# Patient Record
Sex: MALE | Race: BLACK OR AFRICAN AMERICAN | NOT HISPANIC OR LATINO | URBAN - METROPOLITAN AREA
[De-identification: names, ages, dates, MRNs, and addresses within clinical notes are randomized per-mention and may not be internally consistent; named-entity substitution may affect disease eponyms.]

---

## 2017-05-12 ENCOUNTER — INPATIENT (INPATIENT)
Facility: HOSPITAL | Age: 77
LOS: 4 days | Discharge: HOME HEALTH | DRG: 65 | End: 2017-05-17
Attending: PSYCHIATRY & NEUROLOGY | Admitting: PSYCHIATRY & NEUROLOGY
Payer: MEDICAID

## 2017-05-12 VITALS
OXYGEN SATURATION: 97 % | SYSTOLIC BLOOD PRESSURE: 125 MMHG | TEMPERATURE: 100 F | HEART RATE: 94 BPM | RESPIRATION RATE: 20 BRPM | DIASTOLIC BLOOD PRESSURE: 79 MMHG

## 2017-05-12 LAB
ALBUMIN SERPL ELPH-MCNC: 3.7 G/DL — SIGNIFICANT CHANGE UP (ref 3.4–5)
ALP SERPL-CCNC: 63 U/L — SIGNIFICANT CHANGE UP (ref 40–120)
ALT FLD-CCNC: 24 U/L — SIGNIFICANT CHANGE UP (ref 12–42)
ANION GAP SERPL CALC-SCNC: 6 MMOL/L — LOW (ref 9–16)
AST SERPL-CCNC: 27 U/L — SIGNIFICANT CHANGE UP (ref 15–37)
BASOPHILS NFR BLD AUTO: 0.4 % — SIGNIFICANT CHANGE UP (ref 0–2)
BILIRUB SERPL-MCNC: 0.5 MG/DL — SIGNIFICANT CHANGE UP (ref 0.2–1.2)
BLD GP AB SCN SERPL QL: NEGATIVE — SIGNIFICANT CHANGE UP
BLD GP AB SCN SERPL QL: NEGATIVE — SIGNIFICANT CHANGE UP
BUN SERPL-MCNC: 14 MG/DL — SIGNIFICANT CHANGE UP (ref 7–23)
CALCIUM SERPL-MCNC: 8.8 MG/DL — SIGNIFICANT CHANGE UP (ref 8.5–10.5)
CHLORIDE SERPL-SCNC: 106 MMOL/L — SIGNIFICANT CHANGE UP (ref 96–108)
CHOLEST SERPL-MCNC: 113 MG/DL — SIGNIFICANT CHANGE UP
CK MB CFR SERPL CALC: <1 NG/ML — SIGNIFICANT CHANGE UP (ref 0.5–3.6)
CK SERPL-CCNC: 317 U/L — HIGH (ref 39–308)
CO2 SERPL-SCNC: 27 MMOL/L — SIGNIFICANT CHANGE UP (ref 22–31)
CREAT SERPL-MCNC: 1.01 MG/DL — SIGNIFICANT CHANGE UP (ref 0.5–1.3)
EOSINOPHIL NFR BLD AUTO: 3.1 % — SIGNIFICANT CHANGE UP (ref 0–6)
GLUCOSE SERPL-MCNC: 133 MG/DL — HIGH (ref 70–99)
HBA1C BLD-MCNC: 6.9 % — HIGH (ref 4.8–5.6)
HCT VFR BLD CALC: 36.5 % — LOW (ref 39–50)
HDLC SERPL-MCNC: 40 MG/DL — SIGNIFICANT CHANGE UP
HGB BLD-MCNC: 12.6 G/DL — LOW (ref 13–17)
LIPID PNL WITH DIRECT LDL SERPL: 56 MG/DL — SIGNIFICANT CHANGE UP
LYMPHOCYTES # BLD AUTO: 37.8 % — SIGNIFICANT CHANGE UP (ref 13–44)
MCHC RBC-ENTMCNC: 30.7 PG — SIGNIFICANT CHANGE UP (ref 27–34)
MCHC RBC-ENTMCNC: 34.5 G/DL — SIGNIFICANT CHANGE UP (ref 32–36)
MCV RBC AUTO: 89 FL — SIGNIFICANT CHANGE UP (ref 80–100)
MONOCYTES NFR BLD AUTO: 9 % — SIGNIFICANT CHANGE UP (ref 2–14)
NEUTROPHILS NFR BLD AUTO: 49.7 % — SIGNIFICANT CHANGE UP (ref 43–77)
PLATELET # BLD AUTO: 193 K/UL — SIGNIFICANT CHANGE UP (ref 150–400)
POTASSIUM SERPL-MCNC: 3.9 MMOL/L — SIGNIFICANT CHANGE UP (ref 3.5–5.3)
POTASSIUM SERPL-SCNC: 3.9 MMOL/L — SIGNIFICANT CHANGE UP (ref 3.5–5.3)
PROT SERPL-MCNC: 7.3 G/DL — SIGNIFICANT CHANGE UP (ref 6.4–8.2)
RBC # BLD: 4.1 M/UL — LOW (ref 4.2–5.8)
RBC # FLD: 12.4 % — SIGNIFICANT CHANGE UP (ref 10.3–16.9)
RH IG SCN BLD-IMP: POSITIVE — SIGNIFICANT CHANGE UP
RH IG SCN BLD-IMP: POSITIVE — SIGNIFICANT CHANGE UP
SODIUM SERPL-SCNC: 139 MMOL/L — SIGNIFICANT CHANGE UP (ref 135–145)
TOTAL CHOLESTEROL/HDL RATIO MEASUREMENT: 2.8 RATIO — SIGNIFICANT CHANGE UP
TRIGL SERPL-MCNC: 87 MG/DL — SIGNIFICANT CHANGE UP
TROPONIN I SERPL-MCNC: 0.05 NG/ML — HIGH (ref 0.01–0.04)
WBC # BLD: 7.8 K/UL — SIGNIFICANT CHANGE UP (ref 3.8–10.5)
WBC # FLD AUTO: 7.8 K/UL — SIGNIFICANT CHANGE UP (ref 3.8–10.5)

## 2017-05-12 PROCEDURE — 99223 1ST HOSP IP/OBS HIGH 75: CPT

## 2017-05-12 PROCEDURE — 93306 TTE W/DOPPLER COMPLETE: CPT | Mod: 26

## 2017-05-12 PROCEDURE — 93010 ELECTROCARDIOGRAM REPORT: CPT

## 2017-05-12 PROCEDURE — 71010: CPT | Mod: 26

## 2017-05-12 PROCEDURE — 70450 CT HEAD/BRAIN W/O DYE: CPT | Mod: 26

## 2017-05-12 PROCEDURE — 99222 1ST HOSP IP/OBS MODERATE 55: CPT

## 2017-05-12 RX ORDER — ATORVASTATIN CALCIUM 80 MG/1
80 TABLET, FILM COATED ORAL AT BEDTIME
Qty: 0 | Refills: 0 | Status: DISCONTINUED | OUTPATIENT
Start: 2017-05-12 | End: 2017-05-17

## 2017-05-12 RX ORDER — METOPROLOL TARTRATE 50 MG
25 TABLET ORAL
Qty: 0 | Refills: 0 | Status: DISCONTINUED | OUTPATIENT
Start: 2017-05-12 | End: 2017-05-17

## 2017-05-12 RX ORDER — PANTOPRAZOLE SODIUM 20 MG/1
40 TABLET, DELAYED RELEASE ORAL
Qty: 0 | Refills: 0 | Status: DISCONTINUED | OUTPATIENT
Start: 2017-05-12 | End: 2017-05-17

## 2017-05-12 RX ORDER — LISINOPRIL 2.5 MG/1
20 TABLET ORAL DAILY
Qty: 0 | Refills: 0 | Status: DISCONTINUED | OUTPATIENT
Start: 2017-05-13 | End: 2017-05-17

## 2017-05-12 RX ORDER — INSULIN LISPRO 100/ML
VIAL (ML) SUBCUTANEOUS
Qty: 0 | Refills: 0 | Status: DISCONTINUED | OUTPATIENT
Start: 2017-05-12 | End: 2017-05-17

## 2017-05-12 RX ORDER — OMEGA-3 ACID ETHYL ESTERS 1 G
1 CAPSULE ORAL
Qty: 0 | Refills: 0 | COMMUNITY

## 2017-05-12 RX ORDER — OMEPRAZOLE 10 MG/1
1 CAPSULE, DELAYED RELEASE ORAL
Qty: 0 | Refills: 0 | COMMUNITY

## 2017-05-12 RX ORDER — ASCORBIC ACID 60 MG
0 TABLET,CHEWABLE ORAL
Qty: 0 | Refills: 0 | COMMUNITY

## 2017-05-12 RX ORDER — LEVETIRACETAM 250 MG/1
750 TABLET, FILM COATED ORAL EVERY 12 HOURS
Qty: 0 | Refills: 0 | Status: DISCONTINUED | OUTPATIENT
Start: 2017-05-12 | End: 2017-05-13

## 2017-05-12 RX ORDER — LACOSAMIDE 50 MG/1
100 TABLET ORAL EVERY 12 HOURS
Qty: 0 | Refills: 0 | Status: DISCONTINUED | OUTPATIENT
Start: 2017-05-12 | End: 2017-05-13

## 2017-05-12 RX ADMIN — LACOSAMIDE 120 MILLIGRAM(S): 50 TABLET ORAL at 21:11

## 2017-05-12 RX ADMIN — LACOSAMIDE 120 MILLIGRAM(S): 50 TABLET ORAL at 09:13

## 2017-05-12 RX ADMIN — LEVETIRACETAM 400 MILLIGRAM(S): 250 TABLET, FILM COATED ORAL at 21:11

## 2017-05-12 RX ADMIN — LEVETIRACETAM 400 MILLIGRAM(S): 250 TABLET, FILM COATED ORAL at 06:53

## 2017-05-12 RX ADMIN — Medication 25 MILLIGRAM(S): at 18:04

## 2017-05-12 RX ADMIN — ATORVASTATIN CALCIUM 80 MILLIGRAM(S): 80 TABLET, FILM COATED ORAL at 22:30

## 2017-05-12 RX ADMIN — PANTOPRAZOLE SODIUM 40 MILLIGRAM(S): 20 TABLET, DELAYED RELEASE ORAL at 18:03

## 2017-05-12 NOTE — H&P ADULT - NSHPREVIEWOFSYSTEMS_GEN_ALL_CORE
REVIEW OF SYSTEMS:    CONSTITUTIONAL: No fevers or chills  EYES/ENT: No visual changes;  No vertigo or throat pain   NECK: No pain or stiffness  RESPIRATORY: No cough, wheezing, hemoptysis; No shortness of breath  CARDIOVASCULAR: No chest pain or palpitations  GASTROINTESTINAL: No abdominal or epigastric pain. No nausea, vomiting, or hematemesis; No diarrhea or constipation. No melena or hematochezia.  GENITOURINARY: No dysuria, frequency or hematuria  NEUROLOGICAL: speech slurring (+), LUE weakness (+)  SKIN: No itching, burning, rashes, or lesions   All other review of systems is negative unless indicated above.

## 2017-05-12 NOTE — SPEECH LANGUAGE PATHOLOGY EVALUATION - COMMENTS
At his baseline, pt's family report he is AAOx1 w coherent speech w mild occasional slurring. Pt would benefit from speech therapy in an acute rehab targeting the following goals:  (1) The pt will answer yes/no questions  (2) The pt will complete generative naming probes  (3) The pt will answer "wh" questions  (4) The pt will complete automated sequences  (5) The pt will speak with precise articulatory contacts
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
## 2017-05-12 NOTE — SPEECH LANGUAGE PATHOLOGY EVALUATION - SLP DIAGNOSIS
Pt p/w moderate, mixed expressive-receptive aphasia, moderate dysarthria and a moderate-severe cognitive impairment.

## 2017-05-12 NOTE — H&P ADULT - HISTORY OF PRESENT ILLNESS
Patient is a 76yo M w/ PMH CVA (2008, 2012), HTN, HLD, DM2, seizure disorder who presented to [facility] w/ slurred speech and agitation. History obtained from pt's family as pt unable to answer. Pt was in his usual state of health until 10pm when one of his daughters noticed the patient w/ slurred speech and "acting differently." Patient began refusing meds, became fussy and argumentative, which is significantly different from the patient's baseline. EMS was called and patient was brought to [facility]. He was noticed w/ significant left arm weakness worsened from baseline. TPA was pushed at ~1am. CTA was obtained and patient was transferred to [facility] MICU for post-tPA protocol. As per family, no complaints or observations of CP, dyspnea, n/v, ab pain, d/c, syncope, dizziness.    As per pt's wife and other daugther, at baseline, pt is AAOx1 (name); bedbound/wheelchair bound; w/ coherent speech w/ mild, occasional slurring; always compliant w/ meds; easy-going mood and affect though w/ infrequent mood lability ever since prior strokes; LUE weakness 5/10 in strength relative to RUE; poor short term memory and occasional lapses of long-term memory. Last seizure in 2015 or 2016; depakote switched at that time to vimpat. Pt has a HHA from 10a-7p 7d/wk. Patient is a 78yo M w/ PMH CVA (2008, 2012), HTN, HLD, DM2, seizure disorder who presented to [facility] w/ slurred speech and agitation. History obtained from pt's family as pt unable to answer. Pt was in his usual state of health until 10pm when one of his daughters noticed the patient w/ slurred speech and "acting differently." Patient began refusing meds, became fussy and argumentative, which is significantly different from the patient's baseline. EMS was called and patient was brought to [facility]. He was noticed w/ significant left arm weakness worsened from baseline. TPA was pushed at ~1am. CTA was obtained and patient was transferred to [facility] MICU for post-tPA protocol. As per family, no complaints or observations of CP, dyspnea, n/v, ab pain, d/c, syncope, dizziness. On arrival, T: 99.8, P: 94, BP: 125/79, RR: 20 O2: 97% RA    As per pt's wife and other daughter at baseline, pt is AAOx1 (name); bedbound/wheelchair bound; w/ coherent speech w/ mild, occasional slurring; always compliant w/ meds; easy-going mood and affect though w/ infrequent mood lability ever since prior strokes; LUE weakness 5/10 in strength relative to RUE; poor short term memory and occasional lapses of long-term memory. Last seizure in 2015 or 2016; depakote switched at that time to vimpat. Pt has a HHA from 10a-7p 7d/wk.
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
## 2017-05-12 NOTE — H&P ADULT - NSHPLABSRESULTS_GEN_ALL_CORE
CT BRAIN STROKE PROTOCOL    IMPRESSION: No evidence of intracranial hemorrhage or acute transcortical   infarction.

## 2017-05-12 NOTE — CONSULT NOTE ADULT - ATTENDING COMMENTS
Pt seen and examined.   History limited.  Family not at bedside.   78 y/o man with a history of stroke 2008 and 2012 with baseline left-sided weakness, hypertension, hyperlipidemia, seizure disorder on lacosamide and levetiracetam presents with left-sided weakness, agitation, confusion, dysarthria, word-finding difficulties.  No seizures at the time.     On arrival, per family , pt is actually AAOx1 at baseline and bedbound.  Has occasional slurring and occasional labile mood.  Poor cognitive function.  Last seizure 2015. Pt has a HHA from 10a-7p 7d/wk.     CT head on arrival to [facility] with no acute findings.  Chronic ischemic changes with ventriculomegaly.    Overnight no acute issues.      On exam this morning, /66, patient awake and alert.  Knows name, birth year, and "hospital." Does not know age, month, current year.  Confused but pleasant.  Follows some simple commands.  Perrl, right gaze preference but easily crosses midline, decreased blink to threat on left, no facial droop, right arm and leg antigravity.  Left neglect, right left confusion.  Left arm 3/5.  Left leg 2/5.  Left toe downgoing.  LUE reflexes brisk.  BLE reflexes 2+.  Pt would not allow RUE reflex testing.  Brisk withdrawal to noxious stimuli.      78 y/o man with a history of stroke 2008 and 2012 with baseline left-sided weakness, hypertension, hyperlipidemia, seizure disorder on lacosamide and levetiracetam presents with left-sided weakness, agitation, confusion, dysarthria, word-finding difficulties.  Unclear which deficits on exam this morning are new.  Will clarify with family.  Continue AEDs.  Continue post-tpa monitoring.  Repeat CT head at midnight tonight (24 hours post tpa).  MRI brain w/o contrast.  BP <180/105.  Continue telemetry.  If films of CTA head/neck not sent from outside hospital and unable to be sent, check MRA head/neck as well as part of work-up for his stroke etiology.  Start clopidogrel 75 mg daily and hep SQ tomorrow if 24 hour CT head negative for hemorrhage.  Speech consult, PT/OT.

## 2017-05-12 NOTE — H&P ADULT - PMH
Cerebrovascular accident (CVA), unspecified mechanism  2008, 2012  Diabetes mellitus type 2, insulin dependent    Essential hypertension    Hyperlipidemia, unspecified hyperlipidemia type    Seizure disorder

## 2017-05-12 NOTE — CONSULT NOTE ADULT - SUBJECTIVE AND OBJECTIVE BOX
Stroke Code Consult Note    Last known well time/Time of onset of symptoms: 22:00 05/11/2017; 22:00 05/11/2017    HPI: Patient is a 78yo M w/ PMH prior CVA in 2008 and 2012 w/ residual L sided weakness, dementia (A&Ox1 at baseline), HTN, HLD, DM, seizure d/o presenting to OSH with worsening L sided weakness and slurred speech. The patient's family states they were present at the onset of symptoms, which occurred around 10pm. He was brought to OSH, with initial NIHSS 9. CT head was negative for hemorrhage and CTA was negative for thrombus. t-PA was administered at 12:46, followed by infusion for 1H, which was completed at 01:47am, after which he was transferred to [facility] for further management. The patient at this time denies any complaints, and states "I'm alright" when asked. He is able to answer occasional questions although is not always reliable in his answers. He denies any HA, CP, difficulty breathing, nausea, or numbness. Per reports from the family, he is wheelchair bound at baseline.    PAST MEDICAL & SURGICAL HISTORY:  Essential hypertension  Cerebrovascular accident (CVA), unspecified mechanism: 2008, 2012    Review of Systems:  Constitutional: unable to obtain info from patient  Eyes: denies eye pain, visual disturbances  ENMT:  unable to obtain from patient  Respiratory: denies difficulty breathing  Cardiovascular: denies chest pain  Gastrointestinal: unable to obtain info from patient  Genitourinary: unable to obtain info from patient  Neurological: As per HPI  Skin: unable to obtain info from patient  Endocrine: unable to obtain info from patient  Musculoskeletal: unable to obtain info from patient  Psychiatric: unable to obtain info from patient  Heme/Lymph: unable to obtain info from patient    MEDICATIONS  (STANDING):  levETIRAcetam  IVPB 750milliGRAM(s) IV Intermittent every 12 hours  lacosamide IVPB 100milliGRAM(s) IV Intermittent every 12 hours  insulin lispro (HumaLOG) corrective regimen sliding scale  SubCutaneous Before meals and at bedtime    MEDICATIONS  (PRN):      Allergies    No Known Allergies    Intolerances        Vital Signs Last 24 Hrs  T(C): 37.7, Max: 37.7 (05-12 @ 03:05)  T(F): 99.8, Max: 99.8 (05-12 @ 03:05)  HR: 88 (78 - 94)  BP: 122/84 (118/69 - 126/73)  BP(mean): 97 (82 - 97)  RR: 20 (18 - 20)  SpO2: 97% (97% - 97%)    Neurologic Exam:  Gen: No acute distress, well-nourished  CV: carotid arteries- no bruits, reg rate and rhythm, no murmurs  Neuro:  Mental status: Awake, alert and oriented x1. Names 2/3 objects. Unable to repeat "no ifs, ands, or buts".  mild dysarthria, severe aphasia, unable to express more than one word at a time, and answering questions appropriately only partially. right gaze preference, however can be overcome.  Cranial nerves: pupils equally round and reactive to light, visual fields full, no nystagmus, extraocular muscles intact, unable to accurately assess V1 through V3, face symmetric, hearing intact to finger rub, palate elevation symmetric, tongue was midline, sternocleidomastoid/shoulder shrug strength bilaterally 5/5.    Motor:  increased tone in LUE and LLE, strength 2/5 LUE, 4/5 RUE, 2/5LLE, 3/5 RLE.   strength 4/5.    Sensation: Intact to light touch.   Coordination: unable to assess due to poor cooperation  Reflexes: 1+ in upper and lower extremities, withdraws to Babinski bilaterally  Gait: not assessed    NIHSS: 14    Blood glucose (fingerstick): 163     Labs:                Radiology and Additional Studies:    EXAM:  CT BRAIN STROKE PROTOCOL                          PROCEDURE DATE:  05/12/2017    IMPRESSION: No evidence of intracranial hemorrhage or acute transcortical   infarction.    The study was performed at 3:29 AM and the above findings were discussed   with Dr. Lopes at  3:49 AM.      Assessment & Plan:  78yo M w/ PMH prior CVA in 2008 and 2012 w/ residual L sided weakness, dementia (A&Ox1 at baseline), HTN, HLD, DM, seizure d/o presenting to OSH with worsening L sided weakness and slurred speech s/p t-PA administration w/ residual LUE and LLE weakness, dysarthria, aphasia, and inattention now admitted for ICU monitoring post-tPA.   - maintain strict bedrest with HOB 30 degrees for 1st 24H   - bolus 1L IVF for perfusion, maintain BP <180/105   - Continue home seizure medication   - MRI brain w/o contrast   - echocardiogram    - Please bring CD of radiology images from [place] to radiology in AM to upload into [employer]   - Neuro checks every 15 minutes for first hour, every 30 minutes for next 6 hours, then hourly until 24H from t-PA infusion (01:47AM)   - maintain temperature <100F and maintain glucose 80-140mg/dL   - NPO for first 12H, followed by dysphagia screening   - Repeat CT head 24H post t-PA   - hold all antiplatelet, anticoagulation, heparin SQ until 24H CT head negative for bleed.   - SCDs   - PT/OT Stroke Code Consult Note    Last known well time/Time of onset of symptoms: 22:00 05/11/2017; 22:00 05/11/2017    HPI: Patient is a 78yo M w/ PMH prior CVA in 2008 and 2012 w/ residual L sided weakness, dementia (A&Ox1 at baseline), HTN, HLD, DM, seizure d/o presenting to OSH with worsening L sided weakness and slurred speech. The patient's family states they were present at the onset of symptoms, which occurred around 10pm. He was brought to OSH, with initial NIHSS 9. CT head was negative for hemorrhage and CTA was negative for thrombus. t-PA was administered at 12:46, followed by infusion for 1H, which was completed at 01:47am, after which he was transferred to [facility] for further management. The patient at this time denies any complaints, and states "I'm alright" when asked. He is able to answer occasional questions although is not always reliable in his answers. He denies any HA, CP, difficulty breathing, nausea, or numbness. Per reports from the family, he is wheelchair bound at baseline.    PAST MEDICAL & SURGICAL HISTORY:  Essential hypertension  Cerebrovascular accident (CVA), unspecified mechanism: 2008, 2012    Review of Systems:  Constitutional: unable to obtain info from patient  Eyes: denies eye pain, visual disturbances  ENMT:  unable to obtain from patient  Respiratory: denies difficulty breathing  Cardiovascular: denies chest pain  Gastrointestinal: unable to obtain info from patient  Genitourinary: unable to obtain info from patient  Neurological: As per HPI  Skin: unable to obtain info from patient  Endocrine: unable to obtain info from patient  Musculoskeletal: unable to obtain info from patient  Psychiatric: unable to obtain info from patient  Heme/Lymph: unable to obtain info from patient    MEDICATIONS  (STANDING):  levETIRAcetam  IVPB 750milliGRAM(s) IV Intermittent every 12 hours  lacosamide IVPB 100milliGRAM(s) IV Intermittent every 12 hours  insulin lispro (HumaLOG) corrective regimen sliding scale  SubCutaneous Before meals and at bedtime    MEDICATIONS  (PRN):      Allergies    No Known Allergies    Intolerances        Vital Signs Last 24 Hrs  T(C): 37.7, Max: 37.7 (05-12 @ 03:05)  T(F): 99.8, Max: 99.8 (05-12 @ 03:05)  HR: 88 (78 - 94)  BP: 122/84 (118/69 - 126/73)  BP(mean): 97 (82 - 97)  RR: 20 (18 - 20)  SpO2: 97% (97% - 97%)    Neurologic Exam:  Gen: No acute distress, well-nourished  CV: carotid arteries- no bruits, reg rate and rhythm, no murmurs  Neuro:  Mental status: Awake, alert and oriented x1. Names 2/3 objects. Unable to repeat "no ifs, ands, or buts".  mild dysarthria, severe aphasia, unable to express more than one word at a time, and answering questions appropriately only partially. right gaze preference, however can be overcome.  Cranial nerves: pupils equally round and reactive to light, visual fields full, no nystagmus, extraocular muscles intact, unable to accurately assess V1 through V3, face symmetric, hearing intact to finger rub, palate elevation symmetric, tongue was midline, sternocleidomastoid/shoulder shrug strength bilaterally 5/5.    Motor:  increased tone in LUE and LLE, strength 2/5 LUE, 4/5 RUE, 2/5LLE, 3/5 RLE.   strength 4/5.    Sensation: Intact to light touch.   Coordination: unable to assess due to poor cooperation  Reflexes: 1+ in upper and lower extremities, withdraws to Babinski bilaterally  Gait: not assessed    NIHSS: 14    Blood glucose (fingerstick): 163     Labs:      Radiology and Additional Studies:    EXAM:  CT BRAIN STROKE PROTOCOL                          PROCEDURE DATE:  05/12/2017    IMPRESSION: No evidence of intracranial hemorrhage or acute transcortical   infarction.    The study was performed at 3:29 AM and the above findings were discussed   with Dr. Lopes at  3:49 AM.      Assessment & Plan:  78yo M w/ PMH prior CVA in 2008 and 2012 w/ residual L sided weakness, dementia (A&Ox1 at baseline), HTN, HLD, DM, seizure d/o presenting to OSH with worsening L sided weakness and slurred speech s/p t-PA administration w/ residual LUE and LLE weakness, dysarthria, aphasia, and inattention now admitted for ICU monitoring post-tPA.   - maintain strict bedrest with HOB 30 degrees for 1st 24H   - bolus 1L IVF for perfusion, maintain BP <180/105   - Continue home seizure medication   - MRI brain w/o contrast   - echocardiogram    - Please bring CD of radiology images from [place] to radiology in AM to upload into [employer]   - Neuro checks every 15 minutes for first hour, every 30 minutes for next 6 hours, then hourly until 24H from t-PA infusion (01:47AM)   - maintain temperature <100F and maintain glucose 80-140mg/dL   - NPO for first 12H, followed by dysphagia screening   - Repeat CT head 24H post t-PA   - hold all antiplatelet, anticoagulation, heparin SQ until 24H CT head negative for bleed.   - SCDs   - PT/OT

## 2017-05-12 NOTE — H&P ADULT - NSHPPHYSICALEXAM_GEN_ALL_CORE
ICU VITAL SIGNS:  T(C): 37.7, Max: 37.7 (05-12 @ 03:05)  T(F): 99.8, Max: 99.8 (05-12 @ 03:05)  HR: 88 (78 - 94)  BP: 122/84 (118/69 - 126/73)  BP(mean): 97 (82 - 97)  RR: 20 (18 - 20)  SpO2: 97% (97% - 97%)  Wt(kg): --    PHYSICAL EXAM:    Constitutional: WDWN resting comfortably in bed; NAD  Head: NC/AT  Eyes: PERRL, EOMI, anicteric sclera  ENT: no nasal discharge; uvula midline, no oropharyngeal erythema or exudates; MMM  Neck: supple; no JVD or thyromegaly  Respiratory: CTA B/L; no W/R/R, no retractions  Cardiac: +S1/S2; RRR; no M/R/G; PMI non-displaced  Gastrointestinal: soft, NT/ND; no rebound or guarding; +BSx4  Genitourinary: normal external genitalia  Back: spine midline, no bony tenderness or step-offs; no CVAT B/L  Extremities: WWP, no clubbing or cyanosis; no peripheral edema  Musculoskeletal: NROM x4; no joint swelling, tenderness or erythema  Vascular: 2+ radial, femoral, DP/PT pulses B/L  Dermatologic: skin warm, dry and intact; no rashes, wounds, or scars  Lymphatic: no submandibular or cervical LAD  Neurologic: AAOx3; CNII-XII grossly intact; no focal deficits  Psychiatric: affect and characteristics of appearance, verbalizations, behaviors are appropriate ICU VITAL SIGNS:  T(C): 37.7, Max: 37.7 (05-12 @ 03:05)  T(F): 99.8, Max: 99.8 (05-12 @ 03:05)  HR: 88 (78 - 94)  BP: 122/84 (118/69 - 126/73)  BP(mean): 97 (82 - 97)  RR: 20 (18 - 20)  SpO2: 97% (97% - 97%)  Wt(kg): --    PHYSICAL EXAM:    Constitutional: WDWN resting comfortably in bed; NAD  Head: NC/AT  Eyes: PERRL, anicteric sclera  ENT: no nasal discharge; uvula midline, no oropharyngeal erythema or exudates; dry MM  Neck: supple; no JVD or thyromegaly  Respiratory: CTA B/L; no W/R/R, no retractions  Cardiac: +S1/S2; regular rhythm, bradycardia; no M/R/G; PMI non-displaced  Gastrointestinal: soft, NT/ND; no rebound or guarding; +BSx4  Extremities: WWP, no clubbing or cyanosis; no peripheral edema  Musculoskeletal: decreased ROM LUE, LLE; no joint swelling, tenderness or erythema  Vascular: 2+ DP pulses B/L  Dermatologic: skin warm, dry and intact; no rashes, wounds, or scars  Lymphatic: no submandibular or cervical LAD  Neurologic: AAOx2 (name, place); 3/5 strength LUE, 1/5 strength LLE; 5/5 strength RUE, 2/5 strength RLE; decreased L hand  strength; no sensory irregularities; L hemineglect; inability to follow some simple commands; poor short term memory

## 2017-05-13 PROCEDURE — 99233 SBSQ HOSP IP/OBS HIGH 50: CPT | Mod: 24

## 2017-05-13 PROCEDURE — 70450 CT HEAD/BRAIN W/O DYE: CPT | Mod: 26

## 2017-05-13 RX ORDER — LEVETIRACETAM 250 MG/1
750 TABLET, FILM COATED ORAL
Qty: 0 | Refills: 0 | Status: DISCONTINUED | OUTPATIENT
Start: 2017-05-13 | End: 2017-05-17

## 2017-05-13 RX ORDER — ASPIRIN/CALCIUM CARB/MAGNESIUM 324 MG
81 TABLET ORAL DAILY
Qty: 0 | Refills: 0 | Status: DISCONTINUED | OUTPATIENT
Start: 2017-05-13 | End: 2017-05-17

## 2017-05-13 RX ORDER — HEPARIN SODIUM 5000 [USP'U]/ML
5000 INJECTION INTRAVENOUS; SUBCUTANEOUS EVERY 8 HOURS
Qty: 0 | Refills: 0 | Status: DISCONTINUED | OUTPATIENT
Start: 2017-05-13 | End: 2017-05-17

## 2017-05-13 RX ORDER — HEPARIN SODIUM 5000 [USP'U]/ML
5000 INJECTION INTRAVENOUS; SUBCUTANEOUS EVERY 8 HOURS
Qty: 0 | Refills: 0 | Status: DISCONTINUED | OUTPATIENT
Start: 2017-05-13 | End: 2017-05-13

## 2017-05-13 RX ORDER — AMLODIPINE BESYLATE 2.5 MG/1
10 TABLET ORAL DAILY
Qty: 0 | Refills: 0 | Status: DISCONTINUED | OUTPATIENT
Start: 2017-05-13 | End: 2017-05-17

## 2017-05-13 RX ORDER — LACOSAMIDE 50 MG/1
200 TABLET ORAL
Qty: 0 | Refills: 0 | Status: DISCONTINUED | OUTPATIENT
Start: 2017-05-13 | End: 2017-05-17

## 2017-05-13 RX ADMIN — AMLODIPINE BESYLATE 10 MILLIGRAM(S): 2.5 TABLET ORAL at 12:16

## 2017-05-13 RX ADMIN — Medication 2: at 07:41

## 2017-05-13 RX ADMIN — Medication 81 MILLIGRAM(S): at 12:16

## 2017-05-13 RX ADMIN — LEVETIRACETAM 400 MILLIGRAM(S): 250 TABLET, FILM COATED ORAL at 09:22

## 2017-05-13 RX ADMIN — PANTOPRAZOLE SODIUM 40 MILLIGRAM(S): 20 TABLET, DELAYED RELEASE ORAL at 07:34

## 2017-05-13 RX ADMIN — LEVETIRACETAM 750 MILLIGRAM(S): 250 TABLET, FILM COATED ORAL at 22:13

## 2017-05-13 RX ADMIN — Medication 2: at 22:12

## 2017-05-13 RX ADMIN — LISINOPRIL 20 MILLIGRAM(S): 2.5 TABLET ORAL at 07:34

## 2017-05-13 RX ADMIN — Medication 2: at 12:16

## 2017-05-13 RX ADMIN — HEPARIN SODIUM 5000 UNIT(S): 5000 INJECTION INTRAVENOUS; SUBCUTANEOUS at 15:58

## 2017-05-13 RX ADMIN — LACOSAMIDE 200 MILLIGRAM(S): 50 TABLET ORAL at 18:35

## 2017-05-13 RX ADMIN — Medication 25 MILLIGRAM(S): at 18:35

## 2017-05-13 RX ADMIN — HEPARIN SODIUM 5000 UNIT(S): 5000 INJECTION INTRAVENOUS; SUBCUTANEOUS at 22:13

## 2017-05-13 RX ADMIN — Medication 25 MILLIGRAM(S): at 07:34

## 2017-05-13 RX ADMIN — ATORVASTATIN CALCIUM 80 MILLIGRAM(S): 80 TABLET, FILM COATED ORAL at 22:13

## 2017-05-13 RX ADMIN — LACOSAMIDE 200 MILLIGRAM(S): 50 TABLET ORAL at 12:16

## 2017-05-13 NOTE — PHYSICAL THERAPY INITIAL EVALUATION ADULT - ADDITIONAL COMMENTS
Pt A&Ox1 (to self) also able to provide his wife and 1 daughters name, able to answer yes and no questions by saying "yes m'am/no m'am" 100% of time, all other questions pt was able to provide an appropriate response to 25% of the time. Unable to obtain full hx of baseline 2/2 pts accent and slurred speech. Pt states that he lives w/ wife in home w/ no stairs. Has a RW for ambulation, states that he is able to amb from bed to bathroom and that there are grab bars in the bathroom. As per H&P pt has HHA 9hrs/day 7 days/wk.

## 2017-05-13 NOTE — OCCUPATIONAL THERAPY INITIAL EVALUATION ADULT - NS ASR FOLLOW COMMAND OT EVAL
Able to answer yes/no questions/75% of the time/able to follow single-step instructions/speech unintelligible

## 2017-05-13 NOTE — OCCUPATIONAL THERAPY INITIAL EVALUATION ADULT - RANGE OF MOTION EXAMINATION
patient demonstrated AROM of RUE WFL, able to incorporate within bed mobility tasks with some limitation at R shoulder past 110 degrees. AROM 0-50 degrees of L shoulder elicited during reaching task, PROM L shoulder 0-110 degrees within limits of pain

## 2017-05-13 NOTE — PROGRESS NOTE ADULT - ATTENDING COMMENTS
PLAN:   NEURO: neurochecks q2, stepdown to [facility]  Stroke: MRI, cont ASA 81mg daily, high dose statins  h/o seizures, cont lacosamide and keppra  REHAB:  physical therapy evaluation and management    EARLY MOB:      PULM:  Room air, incentive spirometry  CARDIO:  SBP goal 100-180mm Hg; cont metoprolol, lisinopril, amlodipine  ENDO:  Blood sugar goals 140-180 mg/dL, continue insulin sliding scale, good sugar control  GI:  cont PPI (h/o gastric ulcer)  DIET: cont CCD  RENAL:   IV lock  HEM/ONC: Hb stable  VTE Prophylaxis: SCDs, start SQL tonight  ID: afebrile, no leukocytosis  Social: will update family  Patient not at high risk for neurologic deterioration / death.  Time spent on this noncritically ill patient: 45 minutes.

## 2017-05-13 NOTE — PHYSICAL THERAPY INITIAL EVALUATION ADULT - DIAGNOSIS, PT EVAL
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

## 2017-05-13 NOTE — OCCUPATIONAL THERAPY INITIAL EVALUATION ADULT - PERTINENT HX OF CURRENT PROBLEM, REHAB EVAL
76 yo RHD M with h/o CVA, Hypertension, dyslipidemia, Diabetes Mellitus,seizure disorder, presented with slurred speech and agitation.  Brought to [facility], noted L arm weakness.  tPA given at 1 a.m. 05/12, transferred to [facility]

## 2017-05-13 NOTE — OCCUPATIONAL THERAPY INITIAL EVALUATION ADULT - ADDITIONAL COMMENTS
Unable to obtain a full baseline level of function from patient secondary to slurred speech/accent. Able to obtain answers via yes/no questions. Patient reports he received assistance from wife for dressing and bathing. H&P reports home health aide 9 hours per day, 7 days per week. Pt's wife performs IADLs such as cooking. Patient states he used RW for mobility, denies having a wheelchair.

## 2017-05-13 NOTE — PHYSICAL THERAPY INITIAL EVALUATION ADULT - CRITERIA FOR SKILLED THERAPEUTIC INTERVENTIONS
functional limitations in following categories/anticipated discharge recommendation/impairments found/therapy frequency/rehab potential/risk reduction/prevention

## 2017-05-13 NOTE — PHYSICAL THERAPY INITIAL EVALUATION ADULT - PERTINENT HX OF CURRENT PROBLEM, REHAB EVAL
76 yo M w/ PMH CVA (2008, 2012), HTN, HLD, DM2, seizure disorder who presented to [facility] w/ slurred speech and agitation different from baseline. Noted to have significant left arm weakness, tPA pushed at ~1am on 5/12/17. CTA head/neck was negative, transferred to [facility] MICU for post-tPA monitoring, CT head negative for bleed
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
## 2017-05-13 NOTE — OCCUPATIONAL THERAPY INITIAL EVALUATION ADULT - GENERAL OBSERVATIONS, REHAB EVAL
Communicated with DELORES Daigle prior to session and coordinated with PT. Patient received supine with HOB elevated in NAD, +monitor, +tex[place] catheter, +B SCD. Patient does not report pain at rest, patient reports pain in BLE in gravity dependent position and with touch to soles of feet, unable to quantify however grimacing.

## 2017-05-13 NOTE — PHYSICAL THERAPY INITIAL EVALUATION ADULT - MODALITIES TREATMENT COMMENTS
Gaze preference to R visual field, able to overcome and maintain in L visual field w/ max VCs x 15sec

## 2017-05-13 NOTE — PHYSICAL THERAPY INITIAL EVALUATION ADULT - IMPAIRMENTS FOUND, PT EVAL
fine motor/muscle strength/aerobic capacity/endurance/cognitive impairment/gait, locomotion, and balance

## 2017-05-13 NOTE — PROGRESS NOTE ADULT - SUBJECTIVE AND OBJECTIVE BOX
=================================  STROKE ATTENDING NOTE  =================================    ROBERT ZIEGLER   MRN-[id number]  Summary:  77M with h/o CVA Hypertension dyslipidemia Diabetes Mellitus,seizure disorder, presented with slurred speech and agitation.  Brought to [facility], noted L arm weakness.  tPA given at 1 a.m. , transferred to [facility].    Overnight Events: post-tPA  REVIEW OF SYSTEMS:  No headaches, no nausea or vomiting; 14 -point review of systems otherwise unremarkable.    PAST MEDICAL & SURGICAL HISTORY: Seizure disorder Diabetes mellitus type 2, insulin dependent Hyperlipidemia, unspecified hyperlipidemia type Essential hypertension Cerebrovascular accident (CVA), unspecified mechanism: ,  ; h/o gastric ulcer  NKDA  Home meds: lisinopril 20mg daily amlodipine 5mg daily levetiracetam 750 mg BID omeprazole 40mg daily asa 81mg daily metoprolol 25mg BID simvastatin 40mg daily HCTZ 12.5 daily vimpat 200mg BID novolin 22 units and 20 units, MVI vit C fish oil    PHYSICAL EXAMINATION  T(C): , Max: 36.8 (05-12 @ 19:38) HR: 68 (54 - 80) BP: 116/58 (115/62 - 170/84) RR: 24 (18 - 26) SpO2: 93% (92% - 100%)  NEUROLOGIC EXAMINATION:  Patient is awake, oriented x2, L UE 4/5 L LE 2/5 R UE 5/5 R UE 2/5 L hemineglect, does not follow commands, slurred speech  GENERAL:  not intubated, not in cardiorespiratory distress  EENT: anicteric  CARDIOVASC:  (+) S1 S2, normal rate and regular rhythm  PULMONARY:  clear to auscultation bilaterally  ABDOMEN:  soft, nontender, with normoactive bowel sounds  EXTREMITIES:  no edema  SKIN:  no rash    I & Os for 24h ending 17  IN: 230 ml / OUT: 780 ml / NET: -550 ml    LABS:  CAPILLARY BLOOD GLUCOSE 159 (13 May 2017 12:00) 160 (13 May 2017 06:01) 146 (12 May 2017 22:00) 123 (12 May 2017 17:00)                       12.6   7.8   )-----------( 193      ( 12 May 2017 18:27 )             36.5     139  |  106  |  14  ----------------------------<  133<H>  3.9   |  27  |  1.01    Ca    8.8      12 May 2017 18:28    TPro  7.3  /  Alb  3.7  /  TBili  0.5  /  DBili  x   /  AST  27  /  ALT  24  /  AlkPhos  63  -    A1C 6.9  Lipid profile total 113 TG 87 HDL 40 LDL 56    Bacteriology:  CSF studies:  EEG:  Neuroimagin/13 CT: no ICH / infarction, generalized volume loss, ?HCP, stable vents  Other imagin/12 CXR: mild pulmo congestion  Echo: EF 55-60%    MEDICATIONS: mod ISS atorvastatin 80mg HS metoprolol 25mg BID pantoprazole 40mg lisinopril 20mg daily asa 81 mg daily lacosamide 200mg BID levetiracetam 750 BID amlodipine 10mg daily SQH    IV FLUIDS:  DRIPS:  DIET:  Lines / Drains:    CODE STATUS:  full code                       GOALS OF CARE:  aggressive                      DISPOSITION:  stepdown =================================  STROKE ATTENDING NOTE  =================================    ROBERT ZIEGLER   MRN-[id number]  Summary:  77M with h/o CVA Hypertension dyslipidemia Diabetes Mellitus,seizure disorder, presented with slurred speech and agitation.  Brought to [facility], noted L arm weakness.  tPA given at 1 a.m. , transferred to [facility].    Overnight Events: post-tPA  REVIEW OF SYSTEMS:  No headaches, no nausea or vomiting; 14 -point review of systems otherwise unremarkable.    PAST MEDICAL & SURGICAL HISTORY: Seizure disorder Diabetes mellitus type 2, insulin dependent Hyperlipidemia, unspecified hyperlipidemia type Essential hypertension Cerebrovascular accident (CVA), unspecified mechanism: ,  ; h/o gastric ulcer  NKDA  Home meds: lisinopril 20mg daily amlodipine 5mg daily levetiracetam 750 mg BID omeprazole 40mg daily asa 81mg daily metoprolol 25mg BID simvastatin 40mg daily HCTZ 12.5 daily vimpat 200mg BID novolin 22 units and 20 units, MVI vit C fish oil    PHYSICAL EXAMINATION  T(C): , Max: 36.8 (05-12 @ 19:38) HR: 68 (54 - 80) BP: 116/58 (115/62 - 170/84) RR: 24 (18 - 26) SpO2: 93% (92% - 100%)  NEUROLOGIC EXAMINATION:  Patient is awake, oriented x1, L UE 4/5 L LE 3/5 R UE 5/5 R UE 3/5 L hemineglect, does not follow commands, slurred speech  GENERAL:  not intubated, not in cardiorespiratory distress  EENT: anicteric  CARDIOVASC:  (+) S1 S2, normal rate and regular rhythm  PULMONARY:  clear to auscultation bilaterally  ABDOMEN:  soft, nontender, with normoactive bowel sounds  EXTREMITIES:  no edema  SKIN:  no rash    I & Os for 24h ending 17  IN: 230 ml / OUT: 780 ml / NET: -550 ml    LABS:  CAPILLARY BLOOD GLUCOSE 159 (13 May 2017 12:00) 160 (13 May 2017 06:01) 146 (12 May 2017 22:00) 123 (12 May 2017 17:00)                       12.6   7.8   )-----------( 193      ( 12 May 2017 18:27 )             36.5     139  |  106  |  14  ----------------------------<  133<H>  3.9   |  27  |  1.01    Ca    8.8      12 May 2017 18:28    TPro  7.3  /  Alb  3.7  /  TBili  0.5  /  DBili  x   /  AST  27  /  ALT  24  /  AlkPhos  63  -    A1C 6.9  Lipid profile total 113 TG 87 HDL 40 LDL 56    Bacteriology:  CSF studies:  EEG:  Neuroimagin/13 CT: no ICH / infarction, generalized volume loss, ?HCP, stable vents  Other imagin/12 CXR: mild pulmo congestion  Echo: EF 55-60%    MEDICATIONS: mod ISS atorvastatin 80mg HS metoprolol 25mg BID pantoprazole 40mg lisinopril 20mg daily asa 81 mg daily lacosamide 200mg BID levetiracetam 750 BID amlodipine 10mg daily SQH    IV FLUIDS:  DRIPS:  DIET:  Lines / Drains:    CODE STATUS:  full code                       GOALS OF CARE:  aggressive                      DISPOSITION:  stepdown

## 2017-05-13 NOTE — OCCUPATIONAL THERAPY INITIAL EVALUATION ADULT - MUSCLE TONE ASSESSMENT, REHAB EVAL
Left UE/mildly increased tone/L elbow Left UE/L elbow; At times patient clenches R fist and makes punching motions, mostly within context of discomfort/mildly increased tone

## 2017-05-13 NOTE — OCCUPATIONAL THERAPY INITIAL EVALUATION ADULT - GROSSLY INTACT, SENSORY
Grossly Intact/to LT BUE. Hypersensitivity throughout-patient with pain in B soles of feet and with pressure to RUE secondary to BP cuff

## 2017-05-14 LAB
ANION GAP SERPL CALC-SCNC: 4 MMOL/L — LOW (ref 9–16)
BUN SERPL-MCNC: 16 MG/DL — SIGNIFICANT CHANGE UP (ref 7–23)
CALCIUM SERPL-MCNC: 8.9 MG/DL — SIGNIFICANT CHANGE UP (ref 8.5–10.5)
CHLORIDE SERPL-SCNC: 104 MMOL/L — SIGNIFICANT CHANGE UP (ref 96–108)
CO2 SERPL-SCNC: 30 MMOL/L — SIGNIFICANT CHANGE UP (ref 22–31)
CREAT SERPL-MCNC: 1.18 MG/DL — SIGNIFICANT CHANGE UP (ref 0.5–1.3)
GLUCOSE SERPL-MCNC: 145 MG/DL — HIGH (ref 70–99)
HCT VFR BLD CALC: 34.8 % — LOW (ref 39–50)
HGB BLD-MCNC: 11.8 G/DL — LOW (ref 13–17)
MAGNESIUM SERPL-MCNC: 2.1 MG/DL — SIGNIFICANT CHANGE UP (ref 1.6–2.6)
MCHC RBC-ENTMCNC: 30.2 PG — SIGNIFICANT CHANGE UP (ref 27–34)
MCHC RBC-ENTMCNC: 33.9 G/DL — SIGNIFICANT CHANGE UP (ref 32–36)
MCV RBC AUTO: 89 FL — SIGNIFICANT CHANGE UP (ref 80–100)
PLATELET # BLD AUTO: 178 K/UL — SIGNIFICANT CHANGE UP (ref 150–400)
POTASSIUM SERPL-MCNC: 4 MMOL/L — SIGNIFICANT CHANGE UP (ref 3.5–5.3)
POTASSIUM SERPL-SCNC: 4 MMOL/L — SIGNIFICANT CHANGE UP (ref 3.5–5.3)
RBC # BLD: 3.91 M/UL — LOW (ref 4.2–5.8)
RBC # FLD: 12.6 % — SIGNIFICANT CHANGE UP (ref 10.3–16.9)
SODIUM SERPL-SCNC: 138 MMOL/L — SIGNIFICANT CHANGE UP (ref 135–145)
TROPONIN I SERPL-MCNC: <0.015 NG/ML — SIGNIFICANT CHANGE UP (ref 0.01–0.04)
TSH SERPL-MCNC: 1.56 UIU/ML — SIGNIFICANT CHANGE UP (ref 0.35–4.94)
WBC # BLD: 7.6 K/UL — SIGNIFICANT CHANGE UP (ref 3.8–10.5)
WBC # FLD AUTO: 7.6 K/UL — SIGNIFICANT CHANGE UP (ref 3.8–10.5)

## 2017-05-14 PROCEDURE — 99233 SBSQ HOSP IP/OBS HIGH 50: CPT

## 2017-05-14 PROCEDURE — 70551 MRI BRAIN STEM W/O DYE: CPT | Mod: 26

## 2017-05-14 PROCEDURE — 74000: CPT | Mod: 26

## 2017-05-14 RX ORDER — POLYETHYLENE GLYCOL 3350 17 G/17G
17 POWDER, FOR SOLUTION ORAL DAILY
Qty: 0 | Refills: 0 | Status: DISCONTINUED | OUTPATIENT
Start: 2017-05-14 | End: 2017-05-17

## 2017-05-14 RX ORDER — DOCUSATE SODIUM 100 MG
100 CAPSULE ORAL
Qty: 0 | Refills: 0 | Status: DISCONTINUED | OUTPATIENT
Start: 2017-05-14 | End: 2017-05-17

## 2017-05-14 RX ORDER — DOCUSATE SODIUM 100 MG
100 CAPSULE ORAL DAILY
Qty: 0 | Refills: 0 | Status: DISCONTINUED | OUTPATIENT
Start: 2017-05-14 | End: 2017-05-14

## 2017-05-14 RX ADMIN — LEVETIRACETAM 750 MILLIGRAM(S): 250 TABLET, FILM COATED ORAL at 08:33

## 2017-05-14 RX ADMIN — HEPARIN SODIUM 5000 UNIT(S): 5000 INJECTION INTRAVENOUS; SUBCUTANEOUS at 22:28

## 2017-05-14 RX ADMIN — Medication 2: at 22:28

## 2017-05-14 RX ADMIN — Medication 100 MILLIGRAM(S): at 18:41

## 2017-05-14 RX ADMIN — AMLODIPINE BESYLATE 10 MILLIGRAM(S): 2.5 TABLET ORAL at 07:22

## 2017-05-14 RX ADMIN — ATORVASTATIN CALCIUM 80 MILLIGRAM(S): 80 TABLET, FILM COATED ORAL at 22:27

## 2017-05-14 RX ADMIN — Medication 2: at 11:42

## 2017-05-14 RX ADMIN — LEVETIRACETAM 750 MILLIGRAM(S): 250 TABLET, FILM COATED ORAL at 22:28

## 2017-05-14 RX ADMIN — HEPARIN SODIUM 5000 UNIT(S): 5000 INJECTION INTRAVENOUS; SUBCUTANEOUS at 13:23

## 2017-05-14 RX ADMIN — LISINOPRIL 20 MILLIGRAM(S): 2.5 TABLET ORAL at 07:22

## 2017-05-14 RX ADMIN — PANTOPRAZOLE SODIUM 40 MILLIGRAM(S): 20 TABLET, DELAYED RELEASE ORAL at 07:22

## 2017-05-14 RX ADMIN — Medication 2 MILLIGRAM(S): at 16:00

## 2017-05-14 RX ADMIN — LACOSAMIDE 200 MILLIGRAM(S): 50 TABLET ORAL at 07:22

## 2017-05-14 RX ADMIN — POLYETHYLENE GLYCOL 3350 17 GRAM(S): 17 POWDER, FOR SOLUTION ORAL at 18:41

## 2017-05-14 RX ADMIN — Medication 81 MILLIGRAM(S): at 11:42

## 2017-05-14 RX ADMIN — HEPARIN SODIUM 5000 UNIT(S): 5000 INJECTION INTRAVENOUS; SUBCUTANEOUS at 07:21

## 2017-05-14 RX ADMIN — Medication 25 MILLIGRAM(S): at 07:22

## 2017-05-14 RX ADMIN — LACOSAMIDE 200 MILLIGRAM(S): 50 TABLET ORAL at 18:41

## 2017-05-14 RX ADMIN — Medication 25 MILLIGRAM(S): at 18:42

## 2017-05-14 NOTE — PROGRESS NOTE ADULT - ATTENDING COMMENTS
PLAN:   NEURO: neurochecks q2, stepdown to [facility]  Stroke: MRI, cont ASA 81mg daily, high dose statins  h/o seizures, cont lacosamide and keppra  REHAB:  physical therapy evaluation and management    EARLY MOB:      PULM:  Room air, incentive spirometry  CARDIO:  SBP goal 100-180mm Hg; cont metoprolol, lisinopril, amlodipine  ENDO:  Blood sugar goals 140-180 mg/dL, continue insulin sliding scale, good sugar control  GI:  cont PPI (h/o gastric ulcer)  DIET: cont CCD  RENAL:   IV lock  HEM/ONC: Hb stable  VTE Prophylaxis: SCDs, start SQL tonight  ID: afebrile, no leukocytosis  Social: will update family  Patient not at high risk for neurologic deterioration / death.  Time spent on this noncritically ill patient: 45 minutes. PLAN:   NEURO: neurochecks q2, stepdown to [facility]  Stroke: f/u MRI, cont ASA 81mg daily, high dose statins  h/o seizures, cont lacosamide and keppra  REHAB:  physical therapy evaluation and management    EARLY MOB:  OOB to chair if tolerated    PULM:  Room air, incentive spirometry  CARDIO:  SBP goal 100-180mm Hg; on metoprolol, lisinopril, amlodipine  ENDO:  Blood sugar goals 140-180 mg/dL, continue insulin sliding scale, good sugar control  GI:  cont PPI (h/o gastric ulcer)  DIET: cont CCD  RENAL:   IV lock  HEM/ONC: Hb stable  VTE Prophylaxis: SCDs, on SQH  ID: afebrile, no leukocytosis  Social: will update family    Patient not at high risk for neurologic deterioration / death.  Time spent on this noncritically ill patient: 45 minutes. PLAN:   NEURO: neurochecks q2, stepdown to [facility]  Stroke: f/u MRI, cont ASA 81mg daily, high dose statins  h/o seizures, cont lacosamide and keppra  REHAB:  physical therapy evaluation and management    EARLY MOB:  OOB to chair if tolerated    PULM:  Room air, incentive spirometry  CARDIO:  SBP goal 100-180mm Hg; on metoprolol, lisinopril, amlodipine  ENDO:  Blood sugar goals 140-180 mg/dL, continue insulin sliding scale, good sugar control  GI:  cont PPI (h/o gastric ulcer); constipation: start docusate senna standing, and miralax.  DIET: cont CCD  RENAL:   IV lock  HEM/ONC: Hb stable  VTE Prophylaxis: SCDs, on SQH  ID: afebrile, no leukocytosis  Social: will update family    Patient not at high risk for neurologic deterioration / death.  Time spent on this noncritically ill patient: 45 minutes.

## 2017-05-14 NOTE — PROGRESS NOTE ADULT - ASSESSMENT
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  #L sided weakness - unclear if acute or chronic. pending MRI. IMPRESSION:   78yo M w/ PMH prior CVA in 2008 and 2012 w/ residual L sided weakness, dementia (A&Ox1 at baseline), HTN, HLD, DM, seizure d/o presenting to OSH with worsening L sided weakness and slurred speech s/p t-PA administration w/ residual LUE and LLE weakness, dysarthria, aphasia, and inattention, admitted for ICU monitoring post-tPA, now stable for step-down to [facility].    #L sided weakness - unclear if acute or chronic. pending MRI. will c/w high dose statin and aspirin. 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    #L sided weakness - unclear if acute or chronic. pending MRI. will c/w high dose statin and aspirin. SBP goal 100-180. Stroke w/u thus far has been non-contributory for etiology except for A1C 6.9. Blood pressure has been well controlled on home meds. PT and OT consulted.     #Seizure 2/2 original CVA - c/w home meds for ppx    #DM - A1c 6.9. 24 hr only 4 u of coverage. C/w ISS.     #HTN - c/w lisinopril and metoprolol    #Constipation - miralax, colace and senna    #Dementia - patient lives with wife and has HHA. Will need services reinstated once returns home.     #DIET - diabetic and cardiac diet    #PPX - SQH  FULL CODE

## 2017-05-14 NOTE — DIETITIAN INITIAL EVALUATION ADULT. - ENERGY NEEDS
BMI:34.8,IBW:154lbs+/-10%,149% of IBW.Skin intact.No N/V/D or pain.Noted poor dentition requires chopped(mechanical soft foods).Must be fed.

## 2017-05-14 NOTE — PROGRESS NOTE ADULT - SUBJECTIVE AND OBJECTIVE BOX
=================================  STROKE ATTENDING NOTE  =================================    ROBERT ZIEGLER   MRN-[id number]  Summary:  77M with h/o CVA Hypertension dyslipidemia Diabetes Mellitus,seizure disorder, presented with slurred speech and agitation.  Brought to [facility], noted L arm weakness.  tPA given at 1 a.m. , transferred to [facility].    Overnight Events: post-tPA  REVIEW OF SYSTEMS:  No headaches, no nausea or vomiting; 14 -point review of systems otherwise unremarkable.    PAST MEDICAL & SURGICAL HISTORY: Seizure disorder Diabetes mellitus type 2, insulin dependent Hyperlipidemia, unspecified hyperlipidemia type Essential hypertension Cerebrovascular accident (CVA), unspecified mechanism: ,  ; h/o gastric ulcer  NKDA  Home meds: lisinopril 20mg daily amlodipine 5mg daily levetiracetam 750 mg BID omeprazole 40mg daily asa 81mg daily metoprolol 25mg BID simvastatin 40mg daily HCTZ 12.5 daily vimpat 200mg BID novolin 22 units and 20 units, MVI vit C fish oil    PHYSICAL EXAMINATION  T(C): , Max: 36.8 (05-12 @ 19:38) HR: 68 (54 - 80) BP: 116/58 (115/62 - 170/84) RR: 24 (18 - 26) SpO2: 93% (92% - 100%)  NEUROLOGIC EXAMINATION:  Patient is awake, oriented x1, L UE 4/5 L LE 3/5 R UE 5/5 R UE 3/5 L hemineglect, does not follow commands, slurred speech  GENERAL:  not intubated, not in cardiorespiratory distress  EENT: anicteric  CARDIOVASC:  (+) S1 S2, normal rate and regular rhythm  PULMONARY:  clear to auscultation bilaterally  ABDOMEN:  soft, nontender, with normoactive bowel sounds  EXTREMITIES:  no edema  SKIN:  no rash    I & Os for 24h ending 17  IN: 230 ml / OUT: 780 ml / NET: -550 ml    LABS:  CAPILLARY BLOOD GLUCOSE 159 (13 May 2017 12:00) 160 (13 May 2017 06:01) 146 (12 May 2017 22:00) 123 (12 May 2017 17:00)                       12.6   7.8   )-----------( 193      ( 12 May 2017 18:27 )             36.5     139  |  106  |  14  ----------------------------<  133<H>  3.9   |  27  |  1.01    Ca    8.8      12 May 2017 18:28    TPro  7.3  /  Alb  3.7  /  TBili  0.5  /  DBili  x   /  AST  27  /  ALT  24  /  AlkPhos  63  -    A1C 6.9  Lipid profile total 113 TG 87 HDL 40 LDL 56    Bacteriology:  CSF studies:  EEG:  Neuroimagin/13 CT: no ICH / infarction, generalized volume loss, ?HCP, stable vents  Other imagin/12 CXR: mild pulmo congestion  Echo: EF 55-60%    MEDICATIONS: mod ISS atorvastatin 80mg HS metoprolol 25mg BID pantoprazole 40mg lisinopril 20mg daily asa 81 mg daily lacosamide 200mg BID levetiracetam 750 BID amlodipine 10mg daily SQH    IV FLUIDS:  DRIPS:  DIET:  Lines / Drains:    CODE STATUS:  full code                       GOALS OF CARE:  aggressive                      DISPOSITION:  stepdown =================================  STROKE ATTENDING NOTE  =================================    ROBERT ZIEGLER   MRN-[id number]  Summary:  77M with h/o CVA Hypertension dyslipidemia Diabetes Mellitus,seizure disorder, presented with slurred speech and agitation.  Brought to [facility], noted L arm weakness.  tPA given at 1 a.m. , transferred to [facility].    Overnight Events: for stepdown, awaiting bed in [facility], constipated  REVIEW OF SYSTEMS:  No headaches, no nausea or vomiting; 14 -point review of systems otherwise unremarkable.    PAST MEDICAL & SURGICAL HISTORY: Seizure disorder Diabetes mellitus type 2, insulin dependent Hyperlipidemia, unspecified hyperlipidemia type Essential hypertension Cerebrovascular accident (CVA), unspecified mechanism: ,  ; h/o gastric ulcer  NKDA  Home meds: lisinopril 20mg daily amlodipine 5mg daily levetiracetam 750 mg BID omeprazole 40mg daily asa 81mg daily metoprolol 25mg BID simvastatin 40mg daily HCTZ 12.5 daily vimpat 200mg BID novolin 22 units and 20 units, MVI vit C fish oil    PHYSICAL EXAMINATION  T(C): , Max: 36.8 (- @ 18:00) HR: 60 (50 - 102) BP: 97/47 (93/56 - 141/79) RR: 23 (12 - 26) SpO2: 93% (92% - 100%)  NEUROLOGIC EXAMINATION:  Patient is awake, oriented x1, L UE 4/5 L LE 3/5 R UE 5/5 R LE 3/5 L hemineglect, does not follow commands, slurred speech  GENERAL:  not intubated, not in cardiorespiratory distress  EENT: anicteric  CARDIOVASC:  (+) S1 S2, normal rate and regular rhythm  PULMONARY:  clear to auscultation bilaterally  ABDOMEN:  soft, nontender, with normoactive bowel sounds  EXTREMITIES:  no edema  SKIN:  no rash    I & Os for 24h ending 17  IN: 230 ml / OUT: 780 ml / NET: -550 ml    LABS:  CAPILLARY BLOOD GLUCOSE 154 (14 May 2017 11:00) 140 (14 May 2017 07:00) 138 (13 May 2017 17:00)               11.8   7.6   )-----------( 178      ( 14 May 2017 06:42 )             34.8     138  |  104  |  16  ----------------------------<  145<H>  4.0   |  30  |  1.18    Ca    8.9      14 May 2017 06:42  Mg     2.1         TPro  7.3  /  Alb  3.7  /  TBili  0.5  /  DBili  x   /  AST  27  /  ALT  24  /  AlkPhos  63      I & Os for 24h ending  @ 07:00  IN: 1250 ml / OUT: 1300 ml / NET: -50 ml    A1C 6.9  Lipid profile total 113 TG 87 HDL 40 LDL 56    Bacteriology:  CSF studies:  EEG:  Neuroimagin/13 CT: no ICH / infarction, generalized volume loss, ?HCP, stable vents  Other imagin/14 AXR: no obstruction, prominent stool;   CXR: mild pulmo congestion  Echo: EF 55-60%    MEDICATIONS: mod ISS atorvastatin 80mg HS metoprolol 25mg BID pantoprazole 40mg lisinopril 20mg daily asa 81 mg daily lacosamide 200mg BID levetiracetam 750 BID amlodipine 10mg daily SQH    IV FLUIDS:  DRIPS:  DIET: CCD  Lines / Drains:    CODE STATUS:  full code                       GOALS OF CARE:  aggressive                      DISPOSITION:  stepdown =================================  STROKE ATTENDING NOTE  =================================    ROBERT ZIEGLER   MRN-[id number]  Summary:  77M with h/o CVA Hypertension dyslipidemia Diabetes Mellitus,seizure disorder, presented with slurred speech and agitation.  Brought to [facility], noted L arm weakness.  tPA given at 1 a.m. , transferred to [facility].    Overnight Events: for stepdown, awaiting bed in [facility], constipated  REVIEW OF SYSTEMS:  No headaches, no nausea or vomiting; 14 -point review of systems otherwise unremarkable.    PAST MEDICAL & SURGICAL HISTORY: Seizure disorder Diabetes mellitus type 2, insulin dependent Hyperlipidemia, unspecified hyperlipidemia type Essential hypertension Cerebrovascular accident (CVA), unspecified mechanism: ,  ; h/o gastric ulcer  NKDA  Home meds: lisinopril 20mg daily amlodipine 5mg daily levetiracetam 750 mg BID omeprazole 40mg daily asa 81mg daily metoprolol 25mg BID simvastatin 40mg daily HCTZ 12.5 daily vimpat 200mg BID novolin 22 units and 20 units, MVI vit C fish oil    PHYSICAL EXAMINATION  T(C): , Max: 36.8 ( @ 18:00) HR: 60 (50 - 102) BP: 97/47 (93/56 - 141/79) RR: 23 (12 - 26) SpO2: 93% (92% - 100%)  NEUROLOGIC EXAMINATION:  Patient is awake, oriented x1, L hemineglect, does not follow commands, slurred speech, R UE - 5/5 strong ; L UE 4/5, weak , unable to lift both lower extremities off the bed - ~2/5 B LE, able to bend / extend knees 4/5  GENERAL:  not intubated, not in cardiorespiratory distress  EENT: anicteric  CARDIOVASC:  (+) S1 S2, normal rate and regular rhythm  PULMONARY:  clear to auscultation bilaterally  ABDOMEN:  soft, nontender, with normoactive bowel sounds  EXTREMITIES:  no edema  SKIN:  no rash    I & Os for 24h ending 17  IN: 230 ml / OUT: 780 ml / NET: -550 ml    LABS:  CAPILLARY BLOOD GLUCOSE 154 (14 May 2017 11:00) 140 (14 May 2017 07:00) 138 (13 May 2017 17:00)               11.8   7.6   )-----------( 178      ( 14 May 2017 06:42 )             34.8     138  |  104  |  16  ----------------------------<  145<H>  4.0   |  30  |  1.18    Ca    8.9      14 May 2017 06:42  Mg     2.1         TPro  7.3  /  Alb  3.7  /  TBili  0.5  /  DBili  x   /  AST  27  /  ALT  24  /  AlkPhos  63      I & Os for 24h ending  @ 07:00  IN: 1250 ml / OUT: 1300 ml / NET: -50 ml    A1C 6.9  Lipid profile total 113 TG 87 HDL 40 LDL 56    Bacteriology:  CSF studies:  EEG:  Neuroimagin/13 CT: no ICH / infarction, generalized volume loss, ?HCP, stable vents  Other imagin/14 AXR: no obstruction, prominent stool;   CXR: mild pulmo congestion  Echo: EF 55-60%    MEDICATIONS: mod ISS atorvastatin 80mg HS metoprolol 25mg BID pantoprazole 40mg lisinopril 20mg daily asa 81 mg daily lacosamide 200mg BID levetiracetam 750 BID amlodipine 10mg daily SQH    IV FLUIDS:  DRIPS:  DIET: CCD  Lines / Drains:    CODE STATUS:  full code                       GOALS OF CARE:  aggressive                      DISPOSITION:  stepdown

## 2017-05-14 NOTE — DIETITIAN INITIAL EVALUATION ADULT. - OTHER INFO
76 y/o male admitted with seizure and stroke work-up.As per RN,eating 100% of meals.Must be fed due to weakness from stroke.Requires chopped food due to poor dentition.Unable to complete diet history information at this time.

## 2017-05-14 NOTE — PROGRESS NOTE ADULT - SUBJECTIVE AND OBJECTIVE BOX
TRANSFER ACCEPTANCE NOTE FROM MICU TO [address] [place] [address] HOSPITAL COURSE:   78 yo M w/ PMH CVA (2008, 2012 w/ residual L sided weakness), dementia (A&Ox1 at baseline), HTN, HLD, DM2, seizure disorder who presented to [facility] w/ slurred speech and agitation different from baseline. Pt at baseline was AAOx1-0 but able to say simple sentences, follow simple commands and move both upper extremities. Wife noticed he was not moving his left arm and his speech was slurred (doesnt move legs much- wheelchair/bed bound). In [facility] ED, noted to have significant left arm weakness, tPA pushed at ~1am on 5/12/17 for NIHSS score of 9. CTA head/neck was negative, transferred to [facility] MICU for post-tPA monitoring, CT head negative for bleed. At [facility], he had a NIH scale score of 14. Post t-PA pt maintained appropriate mentation, answered "im alright" whenever asked.  Passed dysphagia screen in AM, restarted diet and PO meds, repeat CT head negative 24 hrs after tPA negative for bleed. Restarted HSQ, aspirin. Hemodynamically stable for step down to [facility] under Dr. Cortés.     SUBJECTIVE: no body pain, no HA, f/c    VITAL SIGNS:)  T(F): 97.2, Max: 98.2 (05-13 @ 18:00)  HR: 68 (50 - 102)  BP: 124/61 (93/56 - 141/72)  RR: 16 (12 - 26), SpO2: 95% (92% - 100%) RA  I&O: No BM recorded from 5/13 to present    PHYSICAL EXAM:  Constitutional: NAD on RA, verbal and follows basic commands, has difficulty following more complicated commands.   Eyes: PERRL, unable to get pt gaze to L  ENMT: MMM  Neck: supple  Respiratory: CTAB  Cardiovascular: RRR, late systolic murmur  Gastrointestinal: soft, obese, NT  Extremities: WWP, pitting edema of LE  Vascular: +radial, DPs  Neurological: A&O self, mild R facial droop, 4+ L , able to lift both arms over head, able to bend both knees. Sensation symmetric to soft touch.    Musculoskeletal: No joint swelling    MEDICATIONS  (STANDING):  insulin lispro (HumaLOG) corrective regimen sliding scale  SubCutaneous Before meals and at bedtime  atorvastatin 80milliGRAM(s) Oral at bedtime  metoprolol 25milliGRAM(s) Oral two times a day  pantoprazole    Tablet 40milliGRAM(s) Oral before breakfast  lisinopril 20milliGRAM(s) Oral daily  aspirin enteric coated 81milliGRAM(s) Oral daily  lacosamide 200milliGRAM(s) Oral two times a day  levETIRAcetam 750milliGRAM(s) Oral two times a day  amLODIPine   Tablet 10milliGRAM(s) Oral daily  heparin  Injectable 5000Unit(s) SubCutaneous every 8 hours  docusate sodium 100milliGRAM(s) Oral daily    MEDICATIONS  (PRN):  bisacodyl Suppository 10milliGRAM(s) Rectal daily PRN Constipation      Allergies    No Known Allergies    Intolerances        LABS:                        11.8   7.6   )-----------( 178      ( 14 May 2017 06:42 )             34.8     05-14    138  |  104  |  16  ----------------------------<  145<H>  4.0   |  30  |  1.18    Ca    8.9      14 May 2017 06:42  Mg     2.1     05-14    TPro  7.3  /  Alb  3.7  /  TBili  0.5  /  DBili  x   /  AST  27  /  ALT  24  /  AlkPhos  63  05-12      RADIOLOGY & ADDITIONAL TESTS: Abdominal xray - gas in large bowel including rectal vault. non dilated bowel loops.

## 2017-05-14 NOTE — PROGRESS NOTE ADULT - SUBJECTIVE AND OBJECTIVE BOX
MICU TRANSFER  HOSPITAL COURSE  76 yo M w/ PMH CVA (2008, 2012), HTN, HLD, DM2, seizure disorder who presented to [facility] w/ slurred speech and agitation different from baseline. Noted to have significant left arm weakness, tPA pushed at ~1am on 5/12/17. CTA head/neck was negative, transferred to [facility] MICU for post-tPA monitoring, CT head negative for bleed. Passed dysphagia screen in AM, restarted diet and PO meds, repeat CT head negative 24 hrs after tPA negative for bleed. Restarted HSQ, aspirin. Hemodynamically stable for step down to [facility] under Dr. Cortés.     INTERVAL HPI/OVERNIGHT EVENTS:    SUBJECTIVE: Patient seen and examined at bedside.     OBJECTIVE:    VITAL SIGNS:  ICU Vital Signs Last 24 Hrs  T(C): 36.2, Max: 36.8 (05-13 @ 18:00)  T(F): 97.2, Max: 98.2 (05-13 @ 18:00)  HR: 50 (50 - 102)  BP: 101/60 (93/56 - 141/79)  BP(mean): 71 (69 - 100)  ABP: --  ABP(mean): --  RR: 18 (12 - 26)  SpO2: 98% (92% - 100%)      I & Os for 24h ending 05-14 @ 07:00  =============================================  IN: 1250 ml / OUT: 1300 ml / NET: -50 ml    I & Os for current day (as of 05-14 @ 12:10)  =============================================  IN: 400 ml / OUT: 0 ml / NET: 400 ml    CAPILLARY BLOOD GLUCOSE  154 (14 May 2017 11:00)        PHYSICAL EXAM:  Constitutional: NAD, comfortable in bed, conversive, responds "yes ma'aam" and "no sir"  Head: NC/AT  Eyes: PERRL, anicteric sclera  ENT: no nasal discharge; uvula midline, no oropharyngeal erythema or exudates; MMM  Neck: supple; no JVD or thyromegaly  Respiratory: CTA B/L; no W/R/R, no retractions  Cardiac: +S1/S2; regular rhythm, bradycardia; no M/R/G; PMI non-displaced  Gastrointestinal: abdomen appears more distended today, BS present, not tympanic; no rebound or guarding;  Extremities: WWP, no clubbing or cyanosis; no peripheral edema  Musculoskeletal: decreased ROM of LUE, LLE; no joint swelling, tenderness or erythema  Vascular: 2+ DP pulses B/L  Dermatologic: skin warm, dry and intact; no rashes, wounds, or scars  Lymphatic: no submandibular or cervical LAD  Neurologic: AAOx2 (name, place); 4/5 strength LUE, 2/5 strength LLE; 5/5 strength RUE, 2/5 strength RLE; decreased L hand  strength; no sensory irregularities; L hemineglect; inability to follow some simple commands; poor short term memory, speech still slurred      MEDICATIONS:  MEDICATIONS  (STANDING):  insulin lispro (HumaLOG) corrective regimen sliding scale  SubCutaneous Before meals and at bedtime  atorvastatin 80milliGRAM(s) Oral at bedtime  metoprolol 25milliGRAM(s) Oral two times a day  pantoprazole    Tablet 40milliGRAM(s) Oral before breakfast  lisinopril 20milliGRAM(s) Oral daily  aspirin enteric coated 81milliGRAM(s) Oral daily  lacosamide 200milliGRAM(s) Oral two times a day  levETIRAcetam 750milliGRAM(s) Oral two times a day  amLODIPine   Tablet 10milliGRAM(s) Oral daily  heparin  Injectable 5000Unit(s) SubCutaneous every 8 hours    MEDICATIONS  (PRN):      ALLERGIES:  Allergies    No Known Allergies    Intolerances        LABS:                        11.8   7.6   )-----------( 178      ( 14 May 2017 06:42 )             34.8     05-14    138  |  104  |  16  ----------------------------<  145<H>  4.0   |  30  |  1.18    Ca    8.9      14 May 2017 06:42  Mg     2.1     05-14    TPro  7.3  /  Alb  3.7  /  TBili  0.5  /  DBili  x   /  AST  27  /  ALT  24  /  AlkPhos  63  05-12        RADIOLOGY & ADDITIONAL TESTS: Reviewed.    76yo M w/ PMH prior CVA in 2008 and 2012 w/ residual L sided weakness, dementia (A&Ox1 at baseline), HTN, HLD, DM, seizure d/o presenting to OSH with worsening L sided weakness and slurred speech s/p t-PA administration w/ residual LUE and LLE weakness, dysarthria, aphasia, and inattention, admitted for ICU monitoring post-tPA, now stable for step-down to [facility].    NEURO  #CVA: pt w/ slurred speech, behavioral disturbances, and agitation at 10pm 5/11; s/p tPA given at 12:45am on 5/12/2017, monitored post-tPA in MICU. CTA and CT head with no acute changes; pt still w/ decreased strength and slurred speech; pt w/ 2 prior strokes in '08 and '12. Passed dysphagia screen.  Echo performed LVEF 60%. Repeat CT head at 24 hrs unchanged.   - continue lipitor 80mg daily   - started ASA 81mg daily   - Continue home Keppra 750mg PO BID and Vimpat 200mg PO BID   - f/up MRI brain w/o contrast   - F/up CD of radiology images from [place] to radiology in AM to upload into [facility]   - Neuro checks q2hrs while in [facility]    - maintain temperature <100F and maintain glucose 80-140mg/dL   - SCDs   - PT/OT    #Seizure disorder: as per family, seizures began after pt's stroke in 2012; pt w/ most recent seizure in 2015 or 2016 and depakote was changed to vimpat  -c/w keppra and vimpat as above    CV  #HTN: baseline BP in 120's as per family. On home Lisinopril 20mg daily, amlodipine 5mg daily, Metoprolol 25mg BID, HCTZ 12.5 daily.   -SBP goal 100-180mm Hg;  -reintroduced amlodipine at 10mg dose daily, lisinopril 20mg daily, metoprolol 25mg BID  -continue to hold HCTZ for now    GI  #Gastric ulcer: hx of gastric ulcer necessitating hospitalization  -pantoprazole 40mg qd     #New abdominal Distension today- nontender to palpation, no rebound/guarding  -Abdominal Xray ordered   -will start bowel regimen if constipation     ENDOCRINE  #DM2: on home novolin 22u qAM, 20u qPM  -f/u a1c; c/w mod dose ISS; will titrate basal and premeal as needed    #HLD  - atorvastatin 80mg qd    FEN  #Fluids: not on IV fluids  #Electrolytes: replete prn  #Nutrition: DASH/diabetic diet    PPX: SCDs, restarted HSQ   LINES: aponte (-); Peripheral IV 5/12/17  DISPO: Transfer for MICU to [facility] for further monitoring.  FULL CODE                76yo M w/ PMH prior CVA in 2008 and 2012 w/ residual L sided weakness, dementia (A&Ox1 at baseline), HTN, HLD, DM, seizure d/o presenting to OSH with worsening L sided weakness and slurred speech s/p t-PA administration w/ residual LUE and LLE weakness, dysarthria, aphasia, and inattention, admitted for ICU monitoring post-tPA, now stable for step-down to [facility].    NEURO  #CVA: pt w/ slurred speech, behavioral disturbances, and agitation at 10pm 5/11; s/p tPA given at 12:45am on 5/12/2017, monitored post-tPA in MICU. CTA and CT head with no acute changes; pt still w/ decreased strength and slurred speech; pt w/ 2 prior strokes in '08 and '12. Passed dysphagia screen.  Echo performed LVEF 60%. Repeat CT head at 24 hrs unchanged.   - continue lipitor 80mg daily   - started ASA 81mg daily   - Continue home Keppra 750mg PO BID and Vimpat 200mg PO BID   - f/up MRI brain w/o contrast   - F/up CD of radiology images from [place] to radiology in AM to upload into [facility]   - Neuro checks q2hrs   - maintain temperature <100F and maintain glucose 80-140mg/dL   - SCDs   - PT/OT    #Seizure disorder: as per family, seizures began after pt's stroke in 2012; pt w/ most recent seizure in 2015 or 2016 and depakote was changed to vimpat  -c/w keppra and vimpat as above    CV  #HTN: baseline BP in 120's as per family. On home Lisinopril 20mg daily, amlodipine 5mg daily, Metoprolol 25mg BID, HCTZ 12.5 daily- hold   -reintroduced amlodipine at 10mg dose daily, lisinopril 20mg daily, metoprolol 25mg BID  -continue to hold HCTZ for now    GI  #Gastric ulcer: hx of gastric ulcer necessitating hospitalization  -pantoprazole 40mg qd     ENDOCRINE  #DM2: on home novolin 22u qAM, 20u qPM  -f/u a1c; c/w mod dose ISS; will titrate basal and premeal as needed    #HLD  - atorvastatin 80mg qd    FEN  #Fluids: not on IV fluids  #Electrolytes: replete prn  #Nutrition: DASH/diabetic diet    PPX: SCDs, restarted HSQ today  LINES: aponte (-); Peripheral IV 5/12/17  DISPO: Transfer for MICU to [facility] for further monitoring.  FULL CODE MICU TRANSFER  HOSPITAL COURSE  76 yo M w/ PMH CVA (2008, 2012 w/ residual L sided weakness), dementia (A&Ox1 at baseline), HTN, HLD, DM2, seizure disorder who presented to [facility] w/ slurred speech and agitation different from baseline. Pt at baseline was AAOx1-0 but able to say simple sentences, follow simple commands and move both upper extremities. Wife noticed he was not moving his left arm and his speech was slurred (doesnt move legs much- wheelchair/bed bound). In [place] ED, noted to have significant left arm weakness, tPA pushed at ~1am on 5/12/17. CTA head/neck was negative, transferred to [facility] MICU for post-tPA monitoring, CT head negative for bleed. Passed dysphagia screen in AM, restarted diet and PO meds, repeat CT head negative 24 hrs after tPA negative for bleed. Restarted HSQ, aspirin. Hemodynamically stable for step down to [facility] under Dr. Cortés.     INTERVAL HPI/OVERNIGHT EVENTS:    SUBJECTIVE: Patient seen and examined at bedside.     OBJECTIVE:    VITAL SIGNS:  ICU Vital Signs Last 24 Hrs  T(C): 36.2, Max: 36.8 (05-13 @ 18:00)  T(F): 97.2, Max: 98.2 (05-13 @ 18:00)  HR: 50 (50 - 102)  BP: 101/60 (93/56 - 141/79)  BP(mean): 71 (69 - 100)  ABP: --  ABP(mean): --  RR: 18 (12 - 26)  SpO2: 98% (92% - 100%)      I & Os for 24h ending 05-14 @ 07:00  =============================================  IN: 1250 ml / OUT: 1300 ml / NET: -50 ml    I & Os for current day (as of 05-14 @ 12:10)  =============================================  IN: 400 ml / OUT: 0 ml / NET: 400 ml    CAPILLARY BLOOD GLUCOSE  154 (14 May 2017 11:00)        PHYSICAL EXAM:  Constitutional: NAD, comfortable in bed, conversive, responds "yes ma'aam" and "no sir"  Head: NC/AT  Eyes: PERRL, anicteric sclera  ENT: no nasal discharge; uvula midline, no oropharyngeal erythema or exudates; MMM  Neck: supple; no JVD or thyromegaly  Respiratory: CTA B/L; no W/R/R, no retractions  Cardiac: +S1/S2; regular rhythm, bradycardia; no M/R/G; PMI non-displaced  Gastrointestinal: abdomen appears more distended today, BS present, not tympanic; no rebound or guarding;  Extremities: WWP, no clubbing or cyanosis; no peripheral edema  Musculoskeletal: decreased ROM of LUE, LLE; no joint swelling, tenderness or erythema  Vascular: 2+ DP pulses B/L  Dermatologic: skin warm, dry and intact; no rashes, wounds, or scars  Lymphatic: no submandibular or cervical LAD  Neurologic: AAOx2 (name, place); 4/5 strength LUE, 2/5 strength LLE; 5/5 strength RUE, 2/5 strength RLE; decreased L hand  strength; no sensory irregularities; L hemineglect; inability to follow some simple commands; poor short term memory, speech still slurred      MEDICATIONS:  MEDICATIONS  (STANDING):  insulin lispro (HumaLOG) corrective regimen sliding scale  SubCutaneous Before meals and at bedtime  atorvastatin 80milliGRAM(s) Oral at bedtime  metoprolol 25milliGRAM(s) Oral two times a day  pantoprazole    Tablet 40milliGRAM(s) Oral before breakfast  lisinopril 20milliGRAM(s) Oral daily  aspirin enteric coated 81milliGRAM(s) Oral daily  lacosamide 200milliGRAM(s) Oral two times a day  levETIRAcetam 750milliGRAM(s) Oral two times a day  amLODIPine   Tablet 10milliGRAM(s) Oral daily  heparin  Injectable 5000Unit(s) SubCutaneous every 8 hours    MEDICATIONS  (PRN):      ALLERGIES:  Allergies    No Known Allergies    Intolerances        LABS:                        11.8   7.6   )-----------( 178      ( 14 May 2017 06:42 )             34.8     05-14    138  |  104  |  16  ----------------------------<  145<H>  4.0   |  30  |  1.18    Ca    8.9      14 May 2017 06:42  Mg     2.1     05-14    TPro  7.3  /  Alb  3.7  /  TBili  0.5  /  DBili  x   /  AST  27  /  ALT  24  /  AlkPhos  63  05-12        RADIOLOGY & ADDITIONAL TESTS: Reviewed.    76yo M w/ PMH prior CVA in 2008 and 2012 w/ residual L sided weakness, dementia (A&Ox1 at baseline), HTN, HLD, DM, seizure d/o presenting to OSH with worsening L sided weakness and slurred speech s/p t-PA administration w/ residual LUE and LLE weakness, dysarthria, aphasia, and inattention, admitted for ICU monitoring post-tPA, now stable for step-down to [facility].    NEURO  #CVA: pt w/ slurred speech, behavioral disturbances, and agitation at 10pm 5/11; s/p tPA given at 12:45am on 5/12/2017, monitored post-tPA in MICU. CTA and CT head with no acute changes; pt still w/ decreased strength and slurred speech; pt w/ 2 prior strokes in '08 and '12. Passed dysphagia screen.  Echo performed LVEF 60%. Repeat CT head at 24 hrs unchanged.   - continue lipitor 80mg daily   - started ASA 81mg daily   - Continue home Keppra 750mg PO BID and Vimpat 200mg PO BID   - f/up MRI brain w/o contrast   - F/up CD of radiology images from [place] to radiology in AM to upload into [employer]   - Neuro checks q2hrs while in [facility]    - maintain temperature <100F and maintain glucose 80-140mg/dL   - SCDs   - PT/OT    #Seizure disorder: as per family, seizures began after pt's stroke in 2012; pt w/ most recent seizure in 2015 or 2016 and depakote was changed to vimpat  -c/w keppra and vimpat as above    CV  #HTN: baseline BP in 120's as per family. On home Lisinopril 20mg daily, amlodipine 5mg daily, Metoprolol 25mg BID, HCTZ 12.5 daily.   -SBP goal 100-180mm Hg;  -reintroduced amlodipine at 10mg dose daily, lisinopril 20mg daily, metoprolol 25mg BID  -continue to hold HCTZ for now    GI  #Gastric ulcer: hx of gastric ulcer necessitating hospitalization  -pantoprazole 40mg qd     #New abdominal Distension today- nontender to palpation, no rebound/guarding  -Abdominal Xray ordered   -will start bowel regimen if constipation     ENDOCRINE  #DM2: on home novolin 22u qAM, 20u qPM  -f/u a1c; c/w mod dose ISS; will titrate basal and premeal as needed    #HLD  - atorvastatin 80mg qd    FEN  #Fluids: not on IV fluids  #Electrolytes: replete prn  #Nutrition: DASH/diabetic diet    PPX: SCDs, restarted HSQ   LINES: aponte (-); Peripheral IV 5/12/17  DISPO: Transfer for MICU to [facility] for further monitoring.  FULL CODE                76yo M w/ PMH prior CVA in 2008 and 2012 w/ residual L sided weakness, dementia (A&Ox1 at baseline), HTN, HLD, DM, seizure d/o presenting to OSH with worsening L sided weakness and slurred speech s/p t-PA administration w/ residual LUE and LLE weakness, dysarthria, aphasia, and inattention, admitted for ICU monitoring post-tPA, now stable for step-down to [facility].    NEURO  #CVA: pt w/ slurred speech, behavioral disturbances, and agitation at 10pm 5/11; s/p tPA given at 12:45am on 5/12/2017, monitored post-tPA in MICU. CTA and CT head with no acute changes; pt still w/ decreased strength and slurred speech; pt w/ 2 prior strokes in '08 and '12. Passed dysphagia screen.  Echo performed LVEF 60%. Repeat CT head at 24 hrs unchanged.   - continue lipitor 80mg daily   - started ASA 81mg daily   - Continue home Keppra 750mg PO BID and Vimpat 200mg PO BID   - f/up MRI brain w/o contrast   - F/up CD of radiology images from [place] to radiology in AM to upload into [employer]   - Neuro checks q2hrs   - maintain temperature <100F and maintain glucose 80-140mg/dL   - SCDs   - PT/OT    #Seizure disorder: as per family, seizures began after pt's stroke in 2012; pt w/ most recent seizure in 2015 or 2016 and depakote was changed to vimpat  -c/w keppra and vimpat as above    CV  #HTN: baseline BP in 120's as per family. On home Lisinopril 20mg daily, amlodipine 5mg daily, Metoprolol 25mg BID, HCTZ 12.5 daily- hold   -reintroduced amlodipine at 10mg dose daily, lisinopril 20mg daily, metoprolol 25mg BID  -continue to hold HCTZ for now    GI  #Gastric ulcer: hx of gastric ulcer necessitating hospitalization  -pantoprazole 40mg qd     ENDOCRINE  #DM2: on home novolin 22u qAM, 20u qPM  -f/u a1c; c/w mod dose ISS; will titrate basal and premeal as needed    #HLD  - atorvastatin 80mg qd    FEN  #Fluids: not on IV fluids  #Electrolytes: replete prn  #Nutrition: DASH/diabetic diet    PPX: SCDs, restarted HSQ today  LINES: aponte (-); Peripheral IV 5/12/17  DISPO: Transfer for MICU to [facility] for further monitoring.  FULL CODE MICU TRANSFER  HOSPITAL COURSE  78 yo M w/ PMH CVA (2008, 2012 w/ residual L sided weakness), dementia (A&Ox1 at baseline), HTN, HLD, DM2, seizure disorder who presented to [facility] w/ slurred speech and agitation different from baseline. Pt at baseline was AAOx1-0 but able to say simple sentences, follow simple commands and move both upper extremities. Wife noticed he was not moving his left arm and his speech was slurred (doesnt move legs much- wheelchair/bed bound). In [place] ED, noted to have significant left arm weakness, tPA pushed at ~1am on 5/12/17. CTA head/neck was negative, transferred to [facility] MICU for post-tPA monitoring, CT head negative for bleed. Passed dysphagia screen in AM, restarted diet and PO meds, repeat CT head negative 24 hrs after tPA negative for bleed. Restarted HSQ, aspirin. Hemodynamically stable for step down to [facility] under Dr. Cortés.     INTERVAL HPI/OVERNIGHT EVENTS:    SUBJECTIVE: Patient seen and examined at bedside.     OBJECTIVE:    VITAL SIGNS:  ICU Vital Signs Last 24 Hrs  T(C): 36.2, Max: 36.8 (05-13 @ 18:00)  T(F): 97.2, Max: 98.2 (05-13 @ 18:00)  HR: 50 (50 - 102)  BP: 101/60 (93/56 - 141/79)  BP(mean): 71 (69 - 100)  ABP: --  ABP(mean): --  RR: 18 (12 - 26)  SpO2: 98% (92% - 100%)      I & Os for 24h ending 05-14 @ 07:00  =============================================  IN: 1250 ml / OUT: 1300 ml / NET: -50 ml    I & Os for current day (as of 05-14 @ 12:10)  =============================================  IN: 400 ml / OUT: 0 ml / NET: 400 ml    CAPILLARY BLOOD GLUCOSE  154 (14 May 2017 11:00)        PHYSICAL EXAM:  Constitutional: NAD, comfortable in bed, conversive, responds "yes ma'aam" and "no sir"  Head: NC/AT  Eyes: PERRL, anicteric sclera, gaze preference to R visual field, able to overcome and maintain in L visual field for few seconds   ENT: no nasal discharge; uvula midline, no oropharyngeal erythema or exudates; MMM  Neck: supple; no JVD or thyromegaly  Respiratory: CTA B/L; no W/R/R, no retractions  Cardiac: +S1/S2; regular rhythm, bradycardia; no M/R/G; PMI non-displaced  Gastrointestinal: abdomen appears more distended today, BS present, not tympanic; no rebound or guarding;  Extremities: WWP, no clubbing or cyanosis; no peripheral edema  Musculoskeletal: decreased ROM of LUE, LLE; no joint swelling, tenderness or erythema  Vascular: 2+ DP pulses B/L  Dermatologic: skin warm, dry and intact; no rashes, wounds, or scars  Lymphatic: no submandibular or cervical LAD  Neurologic: AAOx2 (name, place); 4/5 strength LUE, 2/5 strength LLE; 5/5 strength RUE, 2/5 strength RLE; decreased L hand  strength; no sensory irregularities; L hemineglect; inability to follow some simple commands; poor short term memory, speech still slurred      MEDICATIONS:  MEDICATIONS  (STANDING):  insulin lispro (HumaLOG) corrective regimen sliding scale  SubCutaneous Before meals and at bedtime  atorvastatin 80milliGRAM(s) Oral at bedtime  metoprolol 25milliGRAM(s) Oral two times a day  pantoprazole    Tablet 40milliGRAM(s) Oral before breakfast  lisinopril 20milliGRAM(s) Oral daily  aspirin enteric coated 81milliGRAM(s) Oral daily  lacosamide 200milliGRAM(s) Oral two times a day  levETIRAcetam 750milliGRAM(s) Oral two times a day  amLODIPine   Tablet 10milliGRAM(s) Oral daily  heparin  Injectable 5000Unit(s) SubCutaneous every 8 hours    MEDICATIONS  (PRN):      ALLERGIES:  Allergies    No Known Allergies    Intolerances        LABS:                        11.8   7.6   )-----------( 178      ( 14 May 2017 06:42 )             34.8     05-14    138  |  104  |  16  ----------------------------<  145<H>  4.0   |  30  |  1.18    Ca    8.9      14 May 2017 06:42  Mg     2.1     05-14    TPro  7.3  /  Alb  3.7  /  TBili  0.5  /  DBili  x   /  AST  27  /  ALT  24  /  AlkPhos  63  05-12        RADIOLOGY & ADDITIONAL TESTS: Reviewed.    78yo M w/ PMH prior CVA in 2008 and 2012 w/ residual L sided weakness, dementia (A&Ox1 at baseline), HTN, HLD, DM, seizure d/o presenting to OSH with worsening L sided weakness and slurred speech s/p t-PA administration w/ residual LUE and LLE weakness, dysarthria, aphasia, and inattention, admitted for ICU monitoring post-tPA, now stable for step-down to [facility].    NEURO  #CVA: pt w/ slurred speech, behavioral disturbances, and agitation at 10pm 5/11; s/p tPA given at 12:45am on 5/12/2017, monitored post-tPA in MICU. CTA and CT head with no acute changes; pt still w/ decreased strength and slurred speech; pt w/ 2 prior strokes in '08 and '12. Passed dysphagia screen.  Echo performed LVEF 60%. Repeat CT head at 24 hrs unchanged.   - continue lipitor 80mg daily   - started ASA 81mg daily   - Continue home Keppra 750mg PO BID and Vimpat 200mg PO BID   - f/up MRI brain w/o contrast   - F/up CD of radiology images from [place] to radiology in AM to upload into [id number]   - Neuro checks q2hrs while in [facility]    - maintain temperature <100F and maintain glucose 80-140mg/dL   - SCDs   - PT/OT    #Seizure disorder: as per family, seizures began after pt's stroke in 2012; pt w/ most recent seizure in 2015 or 2016 and depakote was changed to vimpat  -c/w keppra and vimpat as above    CV  #HTN: baseline BP in 120's as per family. On home Lisinopril 20mg daily, amlodipine 5mg daily, Metoprolol 25mg BID, HCTZ 12.5 daily.   -SBP goal 100-180mm Hg;  -reintroduced amlodipine at 10mg dose daily, lisinopril 20mg daily, metoprolol 25mg BID  -continue to hold HCTZ for now    GI  #Gastric ulcer: hx of gastric ulcer necessitating hospitalization  -pantoprazole 40mg qd     #New abdominal Distension today- nontender to palpation, no rebound/guarding  -Abdominal Xray ordered   -will start bowel regimen if constipation     ENDOCRINE  #DM2: on home novolin 22u qAM, 20u qPM  -f/u a1c; c/w mod dose ISS; will titrate basal and premeal as needed    #HLD  - atorvastatin 80mg qd    FEN  #Fluids: not on IV fluids  #Electrolytes: replete prn  #Nutrition: DASH/diabetic diet    PPX: SCDs, restarted HSQ   LINES: aponte (-); Peripheral IV 5/12/17  DISPO: Transfer for MICU to [facility] for further monitoring.  FULL CODE                78yo M w/ PMH prior CVA in 2008 and 2012 w/ residual L sided weakness, dementia (A&Ox1 at baseline), HTN, HLD, DM, seizure d/o presenting to OSH with worsening L sided weakness and slurred speech s/p t-PA administration w/ residual LUE and LLE weakness, dysarthria, aphasia, and inattention, admitted for ICU monitoring post-tPA, now stable for step-down to [facility].    NEURO  #CVA: pt w/ slurred speech, behavioral disturbances, and agitation at 10pm 5/11; s/p tPA given at 12:45am on 5/12/2017, monitored post-tPA in MICU. CTA and CT head with no acute changes; pt still w/ decreased strength and slurred speech; pt w/ 2 prior strokes in '08 and '12. Passed dysphagia screen.  Echo performed LVEF 60%. Repeat CT head at 24 hrs unchanged.   - continue lipitor 80mg daily   - started ASA 81mg daily   - Continue home Keppra 750mg PO BID and Vimpat 200mg PO BID   - f/up MRI brain w/o contrast   - F/up CD of radiology images from [place] to radiology in AM to upload into [id number]   - Neuro checks q2hrs   - maintain temperature <100F and maintain glucose 80-140mg/dL   - SCDs   - PT/OT    #Seizure disorder: as per family, seizures began after pt's stroke in 2012; pt w/ most recent seizure in 2015 or 2016 and depakote was changed to vimpat  -c/w keppra and vimpat as above    CV  #HTN: baseline BP in 120's as per family. On home Lisinopril 20mg daily, amlodipine 5mg daily, Metoprolol 25mg BID, HCTZ 12.5 daily- hold   -reintroduced amlodipine at 10mg dose daily, lisinopril 20mg daily, metoprolol 25mg BID  -continue to hold HCTZ for now    GI  #Gastric ulcer: hx of gastric ulcer necessitating hospitalization  -pantoprazole 40mg qd     ENDOCRINE  #DM2: on home novolin 22u qAM, 20u qPM  -f/u a1c; c/w mod dose ISS; will titrate basal and premeal as needed    #HLD  - atorvastatin 80mg qd    FEN  #Fluids: not on IV fluids  #Electrolytes: replete prn  #Nutrition: DASH/diabetic diet    PPX: SCDs, restarted HSQ today  LINES: aponte (-); Peripheral IV 5/12/17  DISPO: Transfer for MICU to [facility] for further monitoring. PT eval: acute rehab vs CELY pending progress.   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 Pt at baseline was AAOx1-0 but able to say simple sentences, follow simple commands and move both upper extremities. Wife noticed he was not moving his left arm and his speech was slurred (doesnt move legs much- wheelchair/bed bound). In [place] ED, noted to have significant left arm weakness, tPA pushed at ~1am on 5/12/17 for NIHSS score of 9. CTA head/neck was negative, transferred to [facility] MICU for post-tPA monitoring, CT head negative for bleed. At [facility], he had a NIH scale score of 14. Post t-PA pt maintained appropriate mentation, answered "im alright" whenever asked.  Passed dysphagia screen in AM, restarted diet and PO meds, repeat CT head negative 24 hrs after tPA negative for bleed. Restarted HSQ, aspirin. Hemodynamically stable for step down to [facility] under Dr. Cortés.     INTERVAL HPI/OVERNIGHT EVENTS:    SUBJECTIVE: Patient seen and examined at bedside.     OBJECTIVE:    VITAL SIGNS:  ICU Vital Signs Last 24 Hrs  T(C): 36.2, Max: 36.8 (05-13 @ 18:00)  T(F): 97.2, Max: 98.2 (05-13 @ 18:00)  HR: 50 (50 - 102)  BP: 101/60 (93/56 - 141/79)  BP(mean): 71 (69 - 100)  ABP: --  ABP(mean): --  RR: 18 (12 - 26)  SpO2: 98% (92% - 100%)      I & Os for 24h ending 05-14 @ 07:00  =============================================  IN: 1250 ml / OUT: 1300 ml / NET: -50 ml    I & Os for current day (as of 05-14 @ 12:10)  =============================================  IN: 400 ml / OUT: 0 ml / NET: 400 ml    CAPILLARY BLOOD GLUCOSE  154 (14 May 2017 11:00)        PHYSICAL EXAM:  Constitutional: NAD, comfortable in bed, conversive, responds "yes ma'aam" and "no sir"  Head: NC/AT  Eyes: PERRL, anicteric sclera, gaze preference to R visual field, able to overcome and maintain in L visual field for few seconds   ENT: no nasal discharge; uvula midline, no oropharyngeal erythema or exudates; MMM  Neck: supple; no JVD or thyromegaly  Respiratory: CTA B/L; no W/R/R, no retractions  Cardiac: +S1/S2; regular rhythm, bradycardia; no M/R/G; PMI non-displaced  Gastrointestinal: abdomen appears more distended today, BS present, not tympanic; no rebound or guarding;  Extremities: WWP, no clubbing or cyanosis; no peripheral edema  Musculoskeletal: decreased ROM of LUE, LLE; no joint swelling, tenderness or erythema  Vascular: 2+ DP pulses B/L  Dermatologic: skin warm, dry and intact; no rashes, wounds, or scars  Lymphatic: no submandibular or cervical LAD  Neurologic: AAOx2 (name, place); 4/5 strength LUE, 2/5 strength LLE; 5/5 strength RUE, 2/5 strength RLE; decreased L hand  strength; no sensory irregularities; L hemineglect; inability to follow some simple commands; poor short term memory, speech still slurred      MEDICATIONS:  MEDICATIONS  (STANDING):  insulin lispro (HumaLOG) corrective regimen sliding scale  SubCutaneous Before meals and at bedtime  atorvastatin 80milliGRAM(s) Oral at bedtime  metoprolol 25milliGRAM(s) Oral two times a day  pantoprazole    Tablet 40milliGRAM(s) Oral before breakfast  lisinopril 20milliGRAM(s) Oral daily  aspirin enteric coated 81milliGRAM(s) Oral daily  lacosamide 200milliGRAM(s) Oral two times a day  levETIRAcetam 750milliGRAM(s) Oral two times a day  amLODIPine   Tablet 10milliGRAM(s) Oral daily  heparin  Injectable 5000Unit(s) SubCutaneous every 8 hours    MEDICATIONS  (PRN):      ALLERGIES:  Allergies    No Known Allergies    Intolerances        LABS:                        11.8   7.6   )-----------( 178      ( 14 May 2017 06:42 )             34.8     05-14    138  |  104  |  16  ----------------------------<  145<H>  4.0   |  30  |  1.18    Ca    8.9      14 May 2017 06:42  Mg     2.1     05-14    TPro  7.3  /  Alb  3.7  /  TBili  0.5  /  DBili  x   /  AST  27  /  ALT  24  /  AlkPhos  63  05-12        RADIOLOGY & ADDITIONAL TESTS: Reviewed.    78yo M w/ PMH prior CVA in 2008 and 2012 w/ residual L sided weakness, dementia (A&Ox1 at baseline), HTN, HLD, DM, seizure d/o presenting to OSH with worsening L sided weakness and slurred speech s/p t-PA administration w/ residual LUE and LLE weakness, dysarthria, aphasia, and inattention, admitted for ICU monitoring post-tPA, now stable for step-down to [facility].    NEURO  #CVA: pt w/ slurred speech, behavioral disturbances, and agitation at 10pm 5/11; s/p tPA given at 12:45am on 5/12/2017, monitored post-tPA in MICU. CTA and CT head with no acute changes; pt still w/ decreased strength and slurred speech; pt w/ 2 prior strokes in '08 and '12. Passed dysphagia screen.  Echo performed LVEF 60%. Repeat CT head at 24 hrs unchanged.   - continue lipitor 80mg daily   - started ASA 81mg daily   - Continue home Keppra 750mg PO BID and Vimpat 200mg PO BID   - f/up MRI brain w/o contrast   - F/up CD of radiology images from [place] to radiology in AM to upload into [employer]   - Neuro checks q2hrs while in [facility]    - maintain temperature <100F and maintain glucose 80-140mg/dL   - SCDs   - PT/OT    #Seizure disorder: as per family, seizures began after pt's stroke in 2012; pt w/ most recent seizure in 2015 or 2016 and depakote was changed to vimpat  -c/w keppra and vimpat as above    CV  #HTN: baseline BP in 120's as per family. On home Lisinopril 20mg daily, amlodipine 5mg daily, Metoprolol 25mg BID, HCTZ 12.5 daily.   -SBP goal 100-180mm Hg;  -reintroduced amlodipine at 10mg dose daily, lisinopril 20mg daily, metoprolol 25mg BID  -continue to hold HCTZ for now    GI  #Gastric ulcer: hx of gastric ulcer necessitating hospitalization  -pantoprazole 40mg qd     #New abdominal Distension today- nontender to palpation, no rebound/guarding  -Abdominal Xray ordered   -will start bowel regimen if constipation     ENDOCRINE  #DM2: on home novolin 22u qAM, 20u qPM  -f/u a1c; c/w mod dose ISS; will titrate basal and premeal as needed    #HLD  - atorvastatin 80mg qd    FEN  #Fluids: not on IV fluids  #Electrolytes: replete prn  #Nutrition: DASH/diabetic diet    PPX: SCDs, restarted HSQ   LINES: aponte (-); Peripheral IV 5/12/17  DISPO: Transfer for MICU to [facility] for further monitoring.  FULL CODE                78yo M w/ PMH prior CVA in 2008 and 2012 w/ residual L sided weakness, dementia (A&Ox1 at baseline), HTN, HLD, DM, seizure d/o presenting to OSH with worsening L sided weakness and slurred speech s/p t-PA administration w/ residual LUE and LLE weakness, dysarthria, aphasia, and inattention, admitted for ICU monitoring post-tPA, now stable for step-down to [facility].    NEURO  #CVA: pt w/ slurred speech, behavioral disturbances, and agitation at 10pm 5/11; s/p tPA given at 12:45am on 5/12/2017, monitored post-tPA in MICU. CTA and CT head with no acute changes; pt still w/ decreased strength and slurred speech; pt w/ 2 prior strokes in '08 and '12. Passed dysphagia screen.  Echo performed LVEF 60%. Repeat CT head at 24 hrs unchanged.   - continue lipitor 80mg daily   - started ASA 81mg daily   - Continue home Keppra 750mg PO BID and Vimpat 200mg PO BID   - f/up MRI brain w/o contrast   - F/up CD of radiology images from [place] to radiology in AM to upload into [employer]   - Neuro checks q2hrs   - maintain temperature <100F and maintain glucose 80-140mg/dL   - SCDs   - PT/OT    #Seizure disorder: as per family, seizures began after pt's stroke in 2012; pt w/ most recent seizure in 2015 or 2016 and depakote was changed to vimpat  -c/w keppra and vimpat as above    CV  #HTN: baseline BP in 120's as per family. On home Lisinopril 20mg daily, amlodipine 5mg daily, Metoprolol 25mg BID, HCTZ 12.5 daily- hold   -reintroduced amlodipine at 10mg dose daily, lisinopril 20mg daily, metoprolol 25mg BID  -continue to hold HCTZ for now    GI  #Gastric ulcer: hx of gastric ulcer necessitating hospitalization  -pantoprazole 40mg qd     ENDOCRINE  #DM2: on home novolin 22u qAM, 20u qPM  -f/u a1c; c/w mod dose ISS; will titrate basal and premeal as needed    #HLD  - atorvastatin 80mg qd    FEN  #Fluids: not on IV fluids  #Electrolytes: replete prn  #Nutrition: DASH/diabetic diet    PPX: SCDs, restarted HSQ today  LINES: aponte (-); Peripheral IV 5/12/17  DISPO: Transfer for MICU to [facility] for further monitoring. PT eval: acute rehab vs CELY pending progress.   FULL CODE

## 2017-05-15 DIAGNOSIS — Z29.9 ENCOUNTER FOR PROPHYLACTIC MEASURES, UNSPECIFIED: ICD-10-CM

## 2017-05-15 DIAGNOSIS — I63.9 CEREBRAL INFARCTION, UNSPECIFIED: ICD-10-CM

## 2017-05-15 DIAGNOSIS — G40.909 EPILEPSY, UNSPECIFIED, NOT INTRACTABLE, WITHOUT STATUS EPILEPTICUS: ICD-10-CM

## 2017-05-15 DIAGNOSIS — E78.5 HYPERLIPIDEMIA, UNSPECIFIED: ICD-10-CM

## 2017-05-15 DIAGNOSIS — E11.9 TYPE 2 DIABETES MELLITUS WITHOUT COMPLICATIONS: ICD-10-CM

## 2017-05-15 RX ORDER — INSULIN GLARGINE 100 [IU]/ML
5 INJECTION, SOLUTION SUBCUTANEOUS EVERY MORNING
Qty: 0 | Refills: 0 | Status: DISCONTINUED | OUTPATIENT
Start: 2017-05-15 | End: 2017-05-17

## 2017-05-15 RX ADMIN — Medication 2: at 22:31

## 2017-05-15 RX ADMIN — LEVETIRACETAM 750 MILLIGRAM(S): 250 TABLET, FILM COATED ORAL at 22:32

## 2017-05-15 RX ADMIN — Medication 10 MILLIGRAM(S): at 09:44

## 2017-05-15 RX ADMIN — Medication 81 MILLIGRAM(S): at 12:30

## 2017-05-15 RX ADMIN — Medication 100 MILLIGRAM(S): at 17:20

## 2017-05-15 RX ADMIN — LACOSAMIDE 200 MILLIGRAM(S): 50 TABLET ORAL at 17:21

## 2017-05-15 RX ADMIN — LACOSAMIDE 200 MILLIGRAM(S): 50 TABLET ORAL at 07:19

## 2017-05-15 RX ADMIN — LISINOPRIL 20 MILLIGRAM(S): 2.5 TABLET ORAL at 07:18

## 2017-05-15 RX ADMIN — HEPARIN SODIUM 5000 UNIT(S): 5000 INJECTION INTRAVENOUS; SUBCUTANEOUS at 22:31

## 2017-05-15 RX ADMIN — HEPARIN SODIUM 5000 UNIT(S): 5000 INJECTION INTRAVENOUS; SUBCUTANEOUS at 07:18

## 2017-05-15 RX ADMIN — Medication 25 MILLIGRAM(S): at 07:18

## 2017-05-15 RX ADMIN — Medication: at 17:21

## 2017-05-15 RX ADMIN — AMLODIPINE BESYLATE 10 MILLIGRAM(S): 2.5 TABLET ORAL at 07:18

## 2017-05-15 RX ADMIN — ATORVASTATIN CALCIUM 80 MILLIGRAM(S): 80 TABLET, FILM COATED ORAL at 22:32

## 2017-05-15 RX ADMIN — Medication 100 MILLIGRAM(S): at 07:18

## 2017-05-15 RX ADMIN — INSULIN GLARGINE 5 UNIT(S): 100 INJECTION, SOLUTION SUBCUTANEOUS at 09:44

## 2017-05-15 RX ADMIN — LEVETIRACETAM 750 MILLIGRAM(S): 250 TABLET, FILM COATED ORAL at 09:46

## 2017-05-15 RX ADMIN — Medication: at 12:31

## 2017-05-15 RX ADMIN — PANTOPRAZOLE SODIUM 40 MILLIGRAM(S): 20 TABLET, DELAYED RELEASE ORAL at 07:18

## 2017-05-15 RX ADMIN — POLYETHYLENE GLYCOL 3350 17 GRAM(S): 17 POWDER, FOR SOLUTION ORAL at 12:31

## 2017-05-15 RX ADMIN — HEPARIN SODIUM 5000 UNIT(S): 5000 INJECTION INTRAVENOUS; SUBCUTANEOUS at 15:15

## 2017-05-15 RX ADMIN — Medication 25 MILLIGRAM(S): at 17:21

## 2017-05-15 RX ADMIN — Medication 2: at 07:19

## 2017-05-15 NOTE — PROGRESS NOTE ADULT - PROBLEM SELECTOR PLAN 5
Diabetic diet, Replete lytes PRN, Hep Sub Q PPX   CODE: FULL  DISPO: FU PT/OT will likely need home services

## 2017-05-15 NOTE — PROGRESS NOTE ADULT - SUBJECTIVE AND OBJECTIVE BOX
INTERVAL HPI/OVERNIGHT EVENTS: Received MRI. SUNNY o/n  SUBJECTIVE:    VITAL SIGNS:  T(F): 97.3, Max: 97.5 (05-14 @ 18:05)  HR: 59 (50 - 70)  BP: 111/54 (97/47 - 150/70)  RR: 14 (12 - 24), SpO2: 99% (92% - 100%) RA    PHYSICAL EXAM:  Constitutional:  Eyes:  ENMT:  Neck:  Respiratory:  Cardiovascular:  Gastrointestinal:  Extremities:  Vascular:  Neurological:  Musculoskeletal:    LABS:  RADIOLOGY & ADDITIONAL TESTS:  MRI brain - Impression: 0.3 cm acute infarction in the left mesial frontal   subcortical white matter. Severe microvascular disease. Bilateral basal   ganglia, internal capsule, external capsule and thalamic chronic lacunar   infarctions. Few small cerebellar chronic infarctions.  Left frontal   chronic infarct.    Scattered punctate foci of gradient echo susceptibility changes within   the bilateral frontal, left parietal, bilateral occipital and left   temporal lobes and a few within the brainstem and bilateral cerebellar   scattered punctate foci of gradient echo susceptibility changes; findings   likely represents  hemosiderin from prior microhemorrhage from hypertension, vasculitis,   bleeding diathesis, trauma, cavernomas. INTERVAL HPI/OVERNIGHT EVENTS: Received MRI. SUNNY o/n  SUBJECTIVE: No pain, HA, n/v/f/c    VITAL SIGNS:  T(F): 97.3, Max: 97.5 (05-14 @ 18:05)  HR: 59 (50 - 70)  BP: 111/54 (97/47 - 150/70)  RR: 14 (12 - 24), SpO2: 99% (92% - 100%) RA  I&O: No BM recorded since admission    PHYSICAL EXAM:  Constitutional: NAD, able to follow only basic commands, answers questions simply and does not speak voluntarily   Eyes: PERRL, unable to assess EOMI as pt not following commands  ENMT: MMM  Neck: no JVD, supple  Respiratory: CTAB  Cardiovascular: RRR, no murmurs  Gastrointestinal: +bs, obese, soft, NT  Extremities: WWP, no edema  Vascular: +radial, DPs  Neurological: A&O x self, R facial droop sparing of the forehead, 4/5 left , rigidity in LUE and LLE. sensation intact and symmetric to soft touch,   Musculoskeletal: crepitus on R knee. No joint swelling    LABS: AM labs not yet back.   RADIOLOGY & ADDITIONAL TESTS:  MRI brain - Impression: 0.3 cm acute infarction in the left mesial frontal   subcortical white matter. Severe microvascular disease. Bilateral basal   ganglia, internal capsule, external capsule and thalamic chronic lacunar   infarctions. Few small cerebellar chronic infarctions.  Left frontal   chronic infarct.    Scattered punctate foci of gradient echo susceptibility changes within   the bilateral frontal, left parietal, bilateral occipital and left   temporal lobes and a few within the brainstem and bilateral cerebellar   scattered punctate foci of gradient echo susceptibility changes; findings   likely represents hemosiderin from prior microhemorrhage from hypertension, vasculitis,   bleeding diathesis, trauma, cavernomas. TRANSFER NOTE FROM [facility] TO REGIONAL MEDICAL FLOOR    BRIEF HOSPITAL COURSE:  76 yo M w/ PMH CVA (2008, 2012 w/ residual L sided weakness, wheelchair bound) c/b seizure disorder, dementia (A&Ox1 at baseline), HTN, HLD, DM2, who presented to [facility] w/ slurred speech and agitation different from baseline. Pt at baseline was AAOx1-0 but able to say simple sentences, follow simple commands and move both upper extremities. Wife noticed he was not moving his left arm and his speech was slurred. In [place] ED, noted to have significant left arm weakness, tPA pushed at ~1am on 5/12/17 for NIHSS score of 9. CTA head/neck was negative, transferred to [facility] MICU for post-tPA monitoring, CT head negative for bleed. At [facility], he had a NIH scale score of 14. Post t-PA pt maintained appropriate mentation, answered "im alright" whenever asked. Repeat CT head negative 24 hrs after tPA negative for bleed. Restarted HSQ, aspirin. Stepped down to ICU stepdown unit. Started treatment for constipation. Received MRI which showed acute infarction in the left mesial frontal subcortical white matter and multiple chronic infarcts. PT evaluated pt and recommended acute rehab vs. CELY depending on progress. He is stable for stepdown to [facility] medical floor.     INTERVAL HPI/OVERNIGHT EVENTS: Received MRI. SUNNY o/n  SUBJECTIVE: No pain, HA, n/v/f/c    VITAL SIGNS:  T(F): 97.3, Max: 97.5 (05-14 @ 18:05)  HR: 59 (50 - 70)  BP: 111/54 (97/47 - 150/70)  RR: 14 (12 - 24), SpO2: 99% (92% - 100%) RA  I&O: No BM recorded since admission    PHYSICAL EXAM:  Constitutional: NAD, able to follow only basic commands, answers questions simply and does not speak voluntarily   Eyes: PERRL, unable to assess EOMI as pt not following commands  ENMT: MMM  Neck: no JVD, supple  Respiratory: CTAB  Cardiovascular: RRR, no murmurs  Gastrointestinal: +bs, obese, soft, NT  Extremities: WWP, no edema  Vascular: +radial, DPs  Neurological: A&O x self, R facial droop sparing of the forehead, 4/5 left , rigidity in LUE and LLE. sensation intact and symmetric to soft touch,   Musculoskeletal: crepitus on R knee. No joint swelling    LABS: AM labs not yet back.   RADIOLOGY & ADDITIONAL TESTS:  MRI brain - Impression: 0.3 cm acute infarction in the left mesial frontal   subcortical white matter. Severe microvascular disease. Bilateral basal   ganglia, internal capsule, external capsule and thalamic chronic lacunar   infarctions. Few small cerebellar chronic infarctions.  Left frontal   chronic infarct.    Scattered punctate foci of gradient echo susceptibility changes within   the bilateral frontal, left parietal, bilateral occipital and left   temporal lobes and a few within the brainstem and bilateral cerebellar   scattered punctate foci of gradient echo susceptibility changes; findings   likely represents hemosiderin from prior microhemorrhage from hypertension, vasculitis,   bleeding diathesis, trauma, cavernomas.

## 2017-05-15 NOTE — PROGRESS NOTE ADULT - SUBJECTIVE AND OBJECTIVE BOX
PGY-1 TRANSFER OF CARE NOTE     76 yo M w/ PMH CVA (2008, 2012 w/ residual L sided weakness, wheelchair bound) c/b seizure disorder, dementia (A&Ox1 at baseline), HTN, HLD, DM2, who presented to [facility] w/ slurred speech and agitation different from baseline. Pt at baseline was AAOx1-0 but able to say simple sentences, follow simple commands and move both upper extremities. Wife noticed he was not moving his left arm and his speech was slurred. In [place] ED, noted to have significant left arm weakness, tPA pushed at ~1am on 5/12/17 for NIHSS score of 9. CTA head/neck was negative, transferred to [facility] MICU for post-tPA monitoring, CT head negative for bleed. At [facility], he had a NIH scale score of 14. Post t-PA pt maintained appropriate mentation, answered "im alright" whenever asked. Repeat CT head negative 24 hrs after tPA negative for bleed. Restarted HSQ, aspirin. Stepped down to ICU stepdown unit. Started treatment for constipation. Received MRI which showed acute infarction in the left mesial frontal subcortical white matter and multiple chronic infarcts. PT evaluated pt and recommended acute rehab vs. CELY depending on progress. He is stable for stepdown to regional medical floor. Upon [facility] accptance this patient had stable VSS and was unchanged neuro exam.     OVERNIGHT: No overnight events.  SUBJECTIVE: Patient seen and examined at bedside.     ROS:  CV: Denies chest pain  Resp: Denies SOB  GI: Denies abdominal pain, constipation, diarrhea, nausea, vomiting  : Denies dysuria  ID: Denies fevers, chills  MSK: Denies joint pain     OBJECTIVE:    VITAL SIGNS:  ICU Vital Signs Last 24 Hrs  T(C): 37.4, Max: 37.4 (05-15 @ 21:27)  T(F): 99.4, Max: 99.4 (05-15 @ 21:27)  HR: 56 (52 - 68)  BP: 134/71 (111/54 - 139/63)  BP(mean): 81 (78 - 91)  ABP: --  ABP(mean): --  RR: 16 (14 - 18)  SpO2: 98% (94% - 99%)      I & Os for 24h ending 05-15 @ 07:00  =============================================  IN: 850 ml / OUT: 0 ml / NET: 850 ml    I & Os for current day (as of 05-15 @ 21:39)  =============================================  IN: 0 ml / OUT: 500 ml / NET: -500 ml    CAPILLARY BLOOD GLUCOSE  152 (15 May 2017 16:43)      PHYSICAL EXAM:    General: NAD, comfortable  HEENT: NCAT, PERRL, clear conjunctiva, no scleral icterus  Neck: supple, no JVD  Respiratory: CTA b/l, no wheezing, rhonchi, rales  Cardiovascular: RRR, normal S1S2, 2/6 Systolic murmur   Abdomen: soft, NT/ND, bowel sounds in all four quadrants, no palpable masses  Extremities: WWP, no clubbing, cyanosis, or edema  Neuro: A&O x self, R facial droop sparing of the forehead, 4/5 left , rigidity in LUE and LLE. sensation intact and symmetric to soft touch,   Musculoskeletal: crepitus on R knee. No joint swelling      MEDICATIONS:  MEDICATIONS  (STANDING):  insulin lispro (HumaLOG) corrective regimen sliding scale  SubCutaneous Before meals and at bedtime  atorvastatin 80milliGRAM(s) Oral at bedtime  metoprolol 25milliGRAM(s) Oral two times a day  pantoprazole    Tablet 40milliGRAM(s) Oral before breakfast  lisinopril 20milliGRAM(s) Oral daily  aspirin enteric coated 81milliGRAM(s) Oral daily  lacosamide 200milliGRAM(s) Oral two times a day  levETIRAcetam 750milliGRAM(s) Oral two times a day  amLODIPine   Tablet 10milliGRAM(s) Oral daily  heparin  Injectable 5000Unit(s) SubCutaneous every 8 hours  polyethylene glycol 3350 17Gram(s) Oral daily  docusate sodium 100milliGRAM(s) Oral two times a day  insulin glargine Injectable (LANTUS) 5Unit(s) SubCutaneous every morning    MEDICATIONS  (PRN):  bisacodyl Suppository 10milliGRAM(s) Rectal daily PRN Constipation      ALLERGIES:  Allergies    No Known Allergies    Intolerances        LABS:                        11.8   7.6   )-----------( 178      ( 14 May 2017 06:42 )             34.8     05-14    138  |  104  |  16  ----------------------------<  145<H>  4.0   |  30  |  1.18    Ca    8.9      14 May 2017 06:42  Mg     2.1     05-14            RADIOLOGY & ADDITIONAL TESTS: Reviewed. PGY-1 TRANSFER OF CARE ACCEPTANCE  NOTE     76 yo M w/ PMH CVA (2008, 2012 w/ residual L sided weakness, wheelchair bound) c/b seizure disorder, dementia (A&Ox1 at baseline), HTN, HLD, DM2, who presented to [facility] w/ slurred speech and agitation different from baseline. Pt at baseline was AAOx1-0 but able to say simple sentences, follow simple commands and move both upper extremities. Wife noticed he was not moving his left arm and his speech was slurred. In [place] ED, noted to have significant left arm weakness, tPA pushed at ~1am on 5/12/17 for NIHSS score of 9. CTA head/neck was negative, transferred to [facility] MICU for post-tPA monitoring, CT head negative for bleed. At [facility], he had a NIH scale score of 14. Post t-PA pt maintained appropriate mentation, answered "im alright" whenever asked. Repeat CT head negative 24 hrs after tPA negative for bleed. Restarted HSQ, aspirin. Stepped down to ICU stepdown unit. Started treatment for constipation. Received MRI which showed acute infarction in the left mesial frontal subcortical white matter and multiple chronic infarcts. PT evaluated pt and recommended acute rehab vs. CEYL depending on progress. He is stable for stepdown to regional medical floor. Upon [facility] accptance this patient had stable VSS and was unchanged neuro exam.     OVERNIGHT: No overnight events.  SUBJECTIVE: Patient seen and examined at bedside.     ROS:  CV: Denies chest pain  Resp: Denies SOB  GI: Denies abdominal pain, constipation, diarrhea, nausea, vomiting  : Denies dysuria  ID: Denies fevers, chills  MSK: Denies joint pain     OBJECTIVE:    VITAL SIGNS:  ICU Vital Signs Last 24 Hrs  T(C): 37.4, Max: 37.4 (05-15 @ 21:27)  T(F): 99.4, Max: 99.4 (05-15 @ 21:27)  HR: 56 (52 - 68)  BP: 134/71 (111/54 - 139/63)  BP(mean): 81 (78 - 91)  ABP: --  ABP(mean): --  RR: 16 (14 - 18)  SpO2: 98% (94% - 99%)      I & Os for 24h ending 05-15 @ 07:00  =============================================  IN: 850 ml / OUT: 0 ml / NET: 850 ml    I & Os for current day (as of 05-15 @ 21:39)  =============================================  IN: 0 ml / OUT: 500 ml / NET: -500 ml    CAPILLARY BLOOD GLUCOSE  152 (15 May 2017 16:43)      PHYSICAL EXAM:    General: NAD, comfortable  HEENT: NCAT, PERRL, clear conjunctiva, no scleral icterus  Neck: supple, no JVD  Respiratory: CTA b/l, no wheezing, rhonchi, rales  Cardiovascular: RRR, normal S1S2, 2/6 Systolic murmur   Abdomen: soft, NT/ND, bowel sounds in all four quadrants, no palpable masses  Extremities: WWP, no clubbing, cyanosis, or edema  Neuro: A&O x self, R facial droop sparing of the forehead, 4/5 left , rigidity in LUE and LLE. sensation intact and symmetric to soft touch,   Musculoskeletal: crepitus on R knee. No joint swelling      MEDICATIONS:  MEDICATIONS  (STANDING):  insulin lispro (HumaLOG) corrective regimen sliding scale  SubCutaneous Before meals and at bedtime  atorvastatin 80milliGRAM(s) Oral at bedtime  metoprolol 25milliGRAM(s) Oral two times a day  pantoprazole    Tablet 40milliGRAM(s) Oral before breakfast  lisinopril 20milliGRAM(s) Oral daily  aspirin enteric coated 81milliGRAM(s) Oral daily  lacosamide 200milliGRAM(s) Oral two times a day  levETIRAcetam 750milliGRAM(s) Oral two times a day  amLODIPine   Tablet 10milliGRAM(s) Oral daily  heparin  Injectable 5000Unit(s) SubCutaneous every 8 hours  polyethylene glycol 3350 17Gram(s) Oral daily  docusate sodium 100milliGRAM(s) Oral two times a day  insulin glargine Injectable (LANTUS) 5Unit(s) SubCutaneous every morning    MEDICATIONS  (PRN):  bisacodyl Suppository 10milliGRAM(s) Rectal daily PRN Constipation      ALLERGIES:  Allergies    No Known Allergies    Intolerances        LABS:                        11.8   7.6   )-----------( 178      ( 14 May 2017 06:42 )             34.8     05-14    138  |  104  |  16  ----------------------------<  145<H>  4.0   |  30  |  1.18    Ca    8.9      14 May 2017 06:42  Mg     2.1     05-14            RADIOLOGY & ADDITIONAL TESTS: Reviewed.

## 2017-05-15 NOTE — PROGRESS NOTE ADULT - ASSESSMENT
IMPRESSION:   76yo M w/ PMH prior CVA in 2008 and 2012 w/ residual L sided weakness (wheelchair bound), dementia (A&Ox1 at baseline), HTN, HLD, DM, seizure d/o presenting to OSH with worsening L sided weakness and slurred speech s/p t-PA administration w/ residual LUE and LLE weakness, dysarthria, aphasia, and inattention, admitted for ICU monitoring post-tPA, now stable for step-down to [facility]. IMPRESSION:   78yo M w/ PMH prior CVA in 2008 and 2012 w/ residual L sided weakness (wheelchair bound), dementia (A&Ox1 at baseline), HTN, HLD, DM, seizure d/o presenting to OSH with worsening L sided weakness and slurred speech s/p t-PA administration w/ residual LUE and LLE weakness, dysarthria, aphasia, and inattention, admitted for ICU monitoring post-tPA, then [facility] for further monitoring now stable for step-down to [facility] .

## 2017-05-15 NOTE — CONSULT NOTE ADULT - ASSESSMENT
76yo M w/ PMH prior CVA in 2008 and 2012 w/ residual L sided weakness, dementia (A&Ox1 at baseline), HTN, HLD, DM, seizure d/o presenting to OSH with worsening L sided weakness and slurred speech s/p t-PA administration w/ residual LUE and LLE weakness, dysarthria, aphasia, and inattention, admitted for ICU monitoring post-tPA, now stable for step-down to [facility].    NEURO  #CVA: pt w/ slurred speech, behavioral disturbances, and agitation at 10pm 5/11; s/p tPA given at 12:45am on 5/12/2017, monitored post-tPA in MICU. CTA and CT head with no acute changes; pt still w/ decreased strength and slurred speech; pt w/ 2 prior strokes in '08 and '12. Passed dysphagia screen.  Echo performed LVEF 60%. Repeat CT head at 24 hrs unchanged.   - continue lipitor 80mg daily   - started ASA 81mg daily   - Continue home Keppra 750mg PO BID and Vimpat 200mg PO BID   - f/up MRI brain w/o contrast   - F/up CD of radiology images from [place] to radiology in AM to upload into [facility]   - Neuro checks q2hrs while in [facility]    - maintain temperature <100F and maintain glucose 80-140mg/dL   - SCDs   - PT/OT    #Seizure disorder: as per family, seizures began after pt's stroke in 2012; pt w/ most recent seizure in 2015 or 2016 and depakote was changed to vimpat  -c/w keppra and vimpat as above

## 2017-05-15 NOTE — CONSULT NOTE ADULT - SUBJECTIVE AND OBJECTIVE BOX
Patient is a 77y old  Male who presents with a chief complaint of slurred speech/L sided weakness/post tPA protocol (12 May 2017 04:10)      HPI:  Patient is a 76yo M w/ PMH CVA (2008, 2012), HTN, HLD, DM2, seizure disorder who presented to [facility] w/ slurred speech and agitation. History obtained from pt's family as pt unable to answer. Pt was in his usual state of health until 10pm when one of his daughters noticed the patient w/ slurred speech and "acting differently." Patient began refusing meds, became fussy and argumentative, which is significantly different from the patient's baseline. EMS was called and patient was brought to [facility]. He was noticed w/ significant left arm weakness worsened from baseline. TPA was pushed at ~1am. CTA was obtained and patient was transferred to [facility] MICU for post-tPA protocol. As per family, no complaints or observations of CP, dyspnea, n/v, ab pain, d/c, syncope, dizziness. On arrival, T: 99.8, P: 94, BP: 125/79, RR: 20 O2: 97% RA    As per pt's wife and other daughter at baseline, pt is AAOx1 (name); bedbound/wheelchair bound; w/ coherent speech w/ mild, occasional slurring; always compliant w/ meds; easy-going mood and affect though w/ infrequent mood lability ever since prior strokes; LUE weakness 5/10 in strength relative to RUE; poor short term memory and occasional lapses of long-term memory. Last seizure in 2015 or 2016; depakote switched at that time to vimpat. Pt has a HHA from 10a-7p 7d/wk. (12 May 2017 04:10)      PAST MEDICAL & SURGICAL HISTORY:  Seizure disorder  Diabetes mellitus type 2, insulin dependent  Hyperlipidemia, unspecified hyperlipidemia type  Essential hypertension  Cerebrovascular accident (CVA), unspecified mechanism: 2008, 2012  No significant past surgical history      MEDICATIONS  (STANDING):  insulin lispro (HumaLOG) corrective regimen sliding scale  SubCutaneous Before meals and at bedtime  atorvastatin 80milliGRAM(s) Oral at bedtime  metoprolol 25milliGRAM(s) Oral two times a day  pantoprazole    Tablet 40milliGRAM(s) Oral before breakfast  lisinopril 20milliGRAM(s) Oral daily  aspirin enteric coated 81milliGRAM(s) Oral daily  lacosamide 200milliGRAM(s) Oral two times a day  levETIRAcetam 750milliGRAM(s) Oral two times a day  amLODIPine   Tablet 10milliGRAM(s) Oral daily  heparin  Injectable 5000Unit(s) SubCutaneous every 8 hours  polyethylene glycol 3350 17Gram(s) Oral daily  docusate sodium 100milliGRAM(s) Oral two times a day  insulin glargine Injectable (LANTUS) 5Unit(s) SubCutaneous every morning    MEDICATIONS  (PRN):  bisacodyl Suppository 10milliGRAM(s) Rectal daily PRN Constipation      Social History: lives with spouse in an elevator accessible apartment building, has HHA 9 hours x 7days/week    Functional Level Prior to Admission: per wife and daughter, patient requires assistance in bathing/dressing, minimal ambulation, bed/wheelchair bound    FAMILY HISTORY:  No pertinent family history in first degree relatives      CBC Full  -  ( 14 May 2017 06:42 )  WBC Count : 7.6 K/uL  Hemoglobin : 11.8 g/dL  Hematocrit : 34.8 %  Platelet Count - Automated : 178 K/uL  Mean Cell Volume : 89.0 fL  Mean Cell Hemoglobin : 30.2 pg  Mean Cell Hemoglobin Concentration : 33.9 g/dL  Auto Neutrophil # : x  Auto Lymphocyte # : x  Auto Monocyte # : x  Auto Eosinophil # : x  Auto Basophil # : x  Auto Neutrophil % : x  Auto Lymphocyte % : x  Auto Monocyte % : x  Auto Eosinophil % : x  Auto Basophil % : x      05-14    138  |  104  |  16  ----------------------------<  145<H>  4.0   |  30  |  1.18    Ca    8.9      14 May 2017 06:42  Mg     2.1     05-14              Radiology:    EXAM:  CT BRAIN                          PROCEDURE DATE:  05/13/2017                     INTERPRETATION:  Yoselin WELSH MD, have reviewed the images and the report   and agree with the findings with the additional modification: There are   tiny bilateral cerebellar chronic infarctions. There is a left frontal   chronic infarction. There are bilateral thalamic and basal ganglia and   external capsule chronic lacunar infarctions. Prominence of the   ventricles that may represent hydrocephalus, stable. Marked microvascular   disease. Resident preliminary report    PROCEDURE: CT head without contrast.    INDICATION: Stroke right MCA territory; status post TPA    TECHNIQUE: Multiple axial sections were obtained at 5 mm intervals. The   images were reviewed in brain and bone windows. Imaging is performed   using helical low-dose technique, and sagittal and coronal reformations   are provided.    COMPARISON: CT brain 5/12/2017 3:29 AM    FINDINGS: The CT examination demonstrates generalized volume loss as   manifested by the enlargement of the ventricles, cisternal spaces, and   cortical sulci throughout. In addition, disproportion enlargement of   lateral and third ventricle which may represent component of   hydrocephalus.    There is no acute intracranial hemorrhage, mass effect, midline shift or   extra axial collections.     The gray white differentiation appears grossly preserved without evidence   for an acute transcortical infarction.     Again noted is marked confluent periventricular white matter lucency,   likely the sequela of small vessel ischemic disease. Again, there is a   0.8 cm hypodensities within the right cerebellar lower hemisphere which   represent chronic lacunar infarction.   The bony windows demonstrates no fractures. The included paranasal   sinuses and mastoid air cells are predominantly clear.    IMPRESSION:     1. No acute intracranial hemorrhage or acute transcortical infarction.    2. Generalized volume loss, chronic infarction and microangiopathic   ischemic change.    3. Possible component of hydrocephaly. Stable ventricles.    EXAM:  MR BRAIN WO CONTRAST                          PROCEDURE DATE:  05/14/2017                     INTERPRETATION:    Clinical history: Stroke status post TPA.    Technique: MRI of the brain was performed utilizing sagittal and axial   T1, axial T2, axial gradient echo, axial and coronal FLAIR and diffusion   imaging.    There are no prior studies available for comparison.    Findings: There is limited evaluation due to motion artifact.    There is 0.3 cm acute infarction in the left mesial frontal subcortical   white matter, There are confluent and patchy foci of T2 and Flair signal   hyperintensities within the white matter that are nonspecific and most   consistent with severe microvascular disease in a patient of this age.   There arebilateral basal ganglia, internal capsule, external capsule and   thalamic chronic lacunar infarctions. There are a few small cerebellar   chronic infarctions. There is a high left frontal chronic infarct.    There is enlargement of the sulci, cisterns and ventricles consistent   with with moderate cerebral volume loss. There is superimposed prominence   of the ventricles suspicious for mild hydrocephalus. There is a cavum   septum lucidum and vergae.. There is no evidence of mass-effect or   midline shift. There is no evidence of intra or extra-axial fluid   collection.  Evaluation of the intracranial vascular flow-voids appear   within normal limits.    There are scattered punctate foci of gradient echo susceptibility changes   within the bilateral frontal, left parietal, bilateral occipital and left   temporal lobes and a few foci within the  brainstem and bilateral   cerebellar scattered punctate foci of gradient echo susceptibility   changes; findings likely represents  hemosiderin from prior microhemorrhage from hypertension, vasculitis,   bleeding diathesis, trauma, cavernomas.  The visualized paranasal sinuses and bilateral mastoid air cells are   clear.    Impression: 0.3 cm acute infarction in the left mesial frontal   subcortical white matter. Severe microvascular disease. Bilateral basal   ganglia, internal capsule, external capsule and thalamic chronic lacunar   infarctions. Few small cerebellar chronic infarctions.  Left frontal   chronic infarct.    Scattered punctate foci of gradient echo susceptibility changes within   the bilateral frontal, left parietal, bilateral occipital and left   temporal lobes and a few within the brainstem and bilateral cerebellar   scattered punctate foci of gradient echo susceptibility changes; findings   likely represents  hemosiderin from prior microhemorrhage from hypertension, vasculitis,   bleeding diathesis, trauma, cavernomas.                      Vital Signs Last 24 Hrs  T(C): 37, Max: 37 (05-15 @ 08:31)  T(F): 98.6, Max: 98.6 (05-15 @ 08:31)  HR: 59 (50 - 70)  BP: 111/54 (97/47 - 150/70)  BP(mean): 78 (63 - 100)  RR: 14 (12 - 23)  SpO2: 99% (92% - 100%)    REVIEW OF SYSTEMS:    CONSTITUTIONAL: No fever, weight loss, or fatigue  EYES: No eye pain, visual disturbances, or discharge  ENMT:  No difficulty hearing, tinnitus, vertigo; No sinus or throat pain  NECK: No pain or stiffness  BREASTS: No pain, masses, or nipple discharge  RESPIRATORY: No cough, wheezing, chills or hemoptysis; No shortness of breath  CARDIOVASCULAR: No chest pain, palpitations, dizziness, or leg swelling  GASTROINTESTINAL: No abdominal or epigastric pain. No nausea, vomiting, or hematemesis; No diarrhea or constipation. No melena or hematochezia.  GENITOURINARY: No dysuria, frequency, hematuria, or incontinence  NEUROLOGICAL: No headaches, memory loss, loss of strength, numbness, or tremors  SKIN: No itching, burning, rashes, or lesions   LYMPH NODES: No enlarged glands  ENDOCRINE: No heat or cold intolerance; No hair loss  MUSCULOSKELETAL: No joint pain or swelling; No muscle, back, or extremity pain  PSYCHIATRIC: No depression, anxiety, mood swings, or difficulty sleeping  HEME/LYMPH: No easy bruising, or bleeding gums  ALLERGY AND IMMUNOLOGIC: No hives or eczema      Physical Exam: WDWN AA gentleman lying in bed, NAD, aphasic    HEENT: normocephalic/ atraumatic, anicteric    Neck: supple, negative JVD, negative carotid bruits,    Chest: CTA bilaterally, neg wheeze, rhonchi, rales, crackles, egophany    Cardiovascular: regular rate and rhythm, neg murmurs/rubs/gallops    Abdomen: soft, obese, negative rebound/guarding, normal bowel sounds, neg hepatosplenomegaly    Extremities: WWP, neg cyanosis/clubbing/edema, negative calf tenderness to palpation, negative Georgina's sign, increased LUE/LE tone    Bilateral knees: OA deformities with crepitus    Neurologic Exam:    Alert and oriented to self, aphasic, speech dysarthric, follows commands    Cranial Nerves:     II:                       left neglect   III/ IV/VI:             extraocular movements intact, neg nystagmus, ptosis  V:                      facial sensation intact, V1-3 normal  VII:                     flattening right NLF, normal eye closure and smile  VIII:                    hearing intact to finger rub bilaterally  IX/ X:                  soft palate rise symmetrical  XI:                      head turning, shoulder shrug normal  XII:                     tongue midline    Motor Exam:    Right UE:           4/5  Right LE:           3+/5   Left   UE:           3+/5  Left   LE:            4-/5      Sensory: intact to LT/PP in all UE/LE dermatomes    DTR:        biceps/     triceps/     brachioradialis                 patella/   medial hamstring/    ankle                 neg clonus                 neg Babinski                 neg Hoffmans      Gait:  NT        PM&R Impression:    1) s/p Acute Left mesial frontal infarct  2) OA knees  3) deconditioned  4) gait dysfunction      Recommendations:    1) Physical therapy focusing on therapeutic exercises, bed mobility/transfer out of bed evaluation, progressive ambulation with assistive devices.    2) Disposition Plan: anticipate subacute rehab placement secondary to prior level of function

## 2017-05-15 NOTE — PROGRESS NOTE ADULT - PROBLEM SELECTOR PLAN 1
MRI and CT findings suggestive of chronic infarctions and currently and acute left frontal infarct. He received TPA for high NIHSS in [facility] and has been engative for rebleeding on FU CAT scan.     - will c/w high dose statin and aspirin.   -SBP goal 100-180. Currently normotensive 120-130.  C/W lisinopril 20 mg and lopressor 25 BID   -Control DM MRI and CT findings suggestive of chronic infarctions and currently and acute left frontal infarct. He received TPA for high NIHSS in [facility] and has been negative for rebleeding on FU CAT scan.     - will c/w high dose statin and aspirin.   -SBP goal 100-180. Currently normotensive 120-130.  C/W lisinopril 20 mg and lopressor 25 BID   -Control DM

## 2017-05-15 NOTE — PROGRESS NOTE ADULT - ASSESSMENT
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    #acute left frontal infarct - will c/w high dose statin and aspirin. SBP goal 100-180. Risk factor w/u pos for A1C 6.9. Blood pressure has been well controlled on home meds. PT and OT consulted.     #Seizure 2/2 original CVA - c/w home meds for ppx    #DM - A1c 6.9. 24 hr only 4 u of coverage. C/w ISS.     #HTN - c/w lisinopril and metoprolol    #Constipation - miralax, colace and senna    #Dementia - patient lives with wife and has HHA. Will need services reinstated once returns home.     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    #acute left frontal infarct - will c/w high dose statin and aspirin. SBP goal 100-180. Risk factor w/u pos for A1C 6.9. Blood pressure has been well controlled on home meds. PT and OT consulted.     #Seizure 2/2 original CVA - c/w home meds for ppx    #DM - A1c 6.9. added basal based on coverage requirements.     #HTN - c/w lisinopril and metoprolol    #Constipation - miralax, colace and senna    #Dementia - patient lives with wife and has HHA. Will need services reinstated once returns home.     #DIET - diabetic and cardiac diet    #PPX - [facility]  FULL CODE

## 2017-05-16 LAB
ANION GAP SERPL CALC-SCNC: 8 MMOL/L — LOW (ref 9–16)
BUN SERPL-MCNC: 16 MG/DL — SIGNIFICANT CHANGE UP (ref 7–23)
CALCIUM SERPL-MCNC: 8.5 MG/DL — SIGNIFICANT CHANGE UP (ref 8.5–10.5)
CHLORIDE SERPL-SCNC: 107 MMOL/L — SIGNIFICANT CHANGE UP (ref 96–108)
CO2 SERPL-SCNC: 26 MMOL/L — SIGNIFICANT CHANGE UP (ref 22–31)
CREAT SERPL-MCNC: 1.11 MG/DL — SIGNIFICANT CHANGE UP (ref 0.5–1.3)
GLUCOSE SERPL-MCNC: 165 MG/DL — HIGH (ref 70–99)
HCT VFR BLD CALC: 33.9 % — LOW (ref 39–50)
HGB BLD-MCNC: 11.6 G/DL — LOW (ref 13–17)
MAGNESIUM SERPL-MCNC: 1.9 MG/DL — SIGNIFICANT CHANGE UP (ref 1.6–2.6)
MCHC RBC-ENTMCNC: 30.9 PG — SIGNIFICANT CHANGE UP (ref 27–34)
MCHC RBC-ENTMCNC: 34.2 G/DL — SIGNIFICANT CHANGE UP (ref 32–36)
MCV RBC AUTO: 90.2 FL — SIGNIFICANT CHANGE UP (ref 80–100)
PLATELET # BLD AUTO: 172 K/UL — SIGNIFICANT CHANGE UP (ref 150–400)
POTASSIUM SERPL-MCNC: 3.9 MMOL/L — SIGNIFICANT CHANGE UP (ref 3.5–5.3)
POTASSIUM SERPL-SCNC: 3.9 MMOL/L — SIGNIFICANT CHANGE UP (ref 3.5–5.3)
RBC # BLD: 3.76 M/UL — LOW (ref 4.2–5.8)
RBC # FLD: 12 % — SIGNIFICANT CHANGE UP (ref 10.3–16.9)
SODIUM SERPL-SCNC: 141 MMOL/L — SIGNIFICANT CHANGE UP (ref 135–145)
WBC # BLD: 7.2 K/UL — SIGNIFICANT CHANGE UP (ref 3.8–10.5)
WBC # FLD AUTO: 7.2 K/UL — SIGNIFICANT CHANGE UP (ref 3.8–10.5)

## 2017-05-16 RX ORDER — SENNA PLUS 8.6 MG/1
1 TABLET ORAL ONCE
Qty: 0 | Refills: 0 | Status: COMPLETED | OUTPATIENT
Start: 2017-05-16 | End: 2017-05-16

## 2017-05-16 RX ADMIN — Medication 2: at 12:30

## 2017-05-16 RX ADMIN — Medication 2: at 21:37

## 2017-05-16 RX ADMIN — Medication 25 MILLIGRAM(S): at 17:58

## 2017-05-16 RX ADMIN — Medication 25 MILLIGRAM(S): at 07:25

## 2017-05-16 RX ADMIN — Medication 2: at 17:59

## 2017-05-16 RX ADMIN — LISINOPRIL 20 MILLIGRAM(S): 2.5 TABLET ORAL at 07:25

## 2017-05-16 RX ADMIN — LACOSAMIDE 200 MILLIGRAM(S): 50 TABLET ORAL at 17:59

## 2017-05-16 RX ADMIN — INSULIN GLARGINE 5 UNIT(S): 100 INJECTION, SOLUTION SUBCUTANEOUS at 09:35

## 2017-05-16 RX ADMIN — Medication 81 MILLIGRAM(S): at 12:30

## 2017-05-16 RX ADMIN — Medication 100 MILLIGRAM(S): at 07:25

## 2017-05-16 RX ADMIN — LEVETIRACETAM 750 MILLIGRAM(S): 250 TABLET, FILM COATED ORAL at 21:37

## 2017-05-16 RX ADMIN — HEPARIN SODIUM 5000 UNIT(S): 5000 INJECTION INTRAVENOUS; SUBCUTANEOUS at 21:37

## 2017-05-16 RX ADMIN — HEPARIN SODIUM 5000 UNIT(S): 5000 INJECTION INTRAVENOUS; SUBCUTANEOUS at 14:46

## 2017-05-16 RX ADMIN — LACOSAMIDE 200 MILLIGRAM(S): 50 TABLET ORAL at 07:25

## 2017-05-16 RX ADMIN — HEPARIN SODIUM 5000 UNIT(S): 5000 INJECTION INTRAVENOUS; SUBCUTANEOUS at 07:27

## 2017-05-16 RX ADMIN — SENNA PLUS 1 TABLET(S): 8.6 TABLET ORAL at 09:35

## 2017-05-16 RX ADMIN — PANTOPRAZOLE SODIUM 40 MILLIGRAM(S): 20 TABLET, DELAYED RELEASE ORAL at 07:25

## 2017-05-16 RX ADMIN — POLYETHYLENE GLYCOL 3350 17 GRAM(S): 17 POWDER, FOR SOLUTION ORAL at 12:30

## 2017-05-16 RX ADMIN — ATORVASTATIN CALCIUM 80 MILLIGRAM(S): 80 TABLET, FILM COATED ORAL at 21:37

## 2017-05-16 RX ADMIN — AMLODIPINE BESYLATE 10 MILLIGRAM(S): 2.5 TABLET ORAL at 07:25

## 2017-05-16 RX ADMIN — Medication 100 MILLIGRAM(S): at 17:58

## 2017-05-16 RX ADMIN — LEVETIRACETAM 750 MILLIGRAM(S): 250 TABLET, FILM COATED ORAL at 09:35

## 2017-05-16 NOTE — DISCHARGE NOTE ADULT - MEDICATION SUMMARY - MEDICATIONS TO TAKE
I will START or STAY ON the medications listed below when I get home from the hospital:    aspirin 81 mg oral delayed release tablet  -- 1 tab(s) by mouth once a day  -- Indication: For Cerebrovascular accident (CVA), unspecified mechanism    lisinopril 20 mg oral tablet  -- 1 tab(s) by mouth once a day  -- Indication: For Essential hypertension    levETIRAcetam 750 mg oral tablet  -- 1 tab(s) by mouth 2 times a day  -- Indication: For Seizure disorder    Vimpat 200 mg oral tablet  -- 1 tab(s) by mouth 2 times a day  -- Indication: For Seizure disorder    NovoLIN N 100 units/mL subcutaneous suspension  -- 22 unit(s) subcutaneous once a day (in the morning)  -- Indication: For Diabetes mellitus type 2, insulin dependent    NovoLIN N 100 units/mL subcutaneous suspension  -- 20 unit(s) subcutaneous once a day (in the evening)  -- Indication: For Diabetes mellitus type 2, insulin dependent    atorvastatin 80 mg oral tablet  -- 1 tab(s) by mouth once a day (at bedtime)  -- Indication: For Cerebrovascular accident (CVA), unspecified mechanism    metoprolol tartrate 25 mg oral tablet  -- 1 tab(s) by mouth 2 times a day  -- Indication: For Essential hypertension    amLODIPine 5 mg oral tablet  -- 1 tab(s) by mouth once a day  -- Indication: For Essential hypertension    Fish Oil oral capsule  -- 1 tab(s) by mouth once a day  -- Indication: For Cerebrovascular accident (CVA), unspecified mechanism    omeprazole 40 mg oral delayed release capsule  -- 1 cap(s) by mouth once a day  -- Indication: For Need for prophylactic measure    multivitamin  -- 1 tab(s) by mouth once a day  -- Indication: For Need for prophylactic measure    Vitamin C  --  by mouth   -- Indication: For Need for prophylactic measure I will START or STAY ON the medications listed below when I get home from the hospital:    lisinopril 20 mg oral tablet  -- 1 tab(s) by mouth once a day  -- Indication: For Hypertension    levETIRAcetam 750 mg oral tablet  -- 1 tab(s) by mouth 2 times a day  -- Indication: For Seizure disorder    lacosamide 200 mg oral tablet  -- 1 tab(s) by mouth 2 times a day  -- Indication: For Seizure disorder    NovoLIN N 100 units/mL subcutaneous suspension  -- 22 unit(s) subcutaneous once a day (in the morning)  -- Indication: For Diabetes mellitus type 2, insulin dependent    NovoLIN N 100 units/mL subcutaneous suspension  -- 20 unit(s) subcutaneous once a day (in the evening)  -- Indication: For Diabetes mellitus type 2, insulin dependent    metoprolol tartrate 25 mg oral tablet  -- 1 tab(s) by mouth 2 times a day  -- Indication: For Essential hypertension    amLODIPine 10 mg oral tablet  -- 1 tab(s) by mouth once a day  -- Indication: For Essential hypertension    bisacodyl 10 mg rectal suppository  -- 1 suppository(ies) rectally once a day, As needed, Constipation  -- Indication: For Constipation    docusate sodium 100 mg oral capsule  -- 1 cap(s) by mouth 2 times a day  -- Indication: For Constipation    polyethylene glycol 3350 oral powder for reconstitution  -- 17 gram(s) by mouth once a day  -- Indication: For Constipation    Fish Oil oral capsule  -- 1 tab(s) by mouth once a day  -- Indication: For Cerebrovascular accident (CVA), unspecified mechanism    omeprazole 40 mg oral delayed release capsule  -- 1 cap(s) by mouth once a day  -- Indication: For Need for prophylactic measure    multivitamin  -- 1 tab(s) by mouth once a day  -- Indication: For Need for prophylactic measure    Vitamin C  --  by mouth   -- Indication: For Need for prophylactic measure I will START or STAY ON the medications listed below when I get home from the hospital:    Aspirin Enteric Coated 325 mg oral delayed release tablet  -- 1 tab(s) by mouth once a day  -- Indication: For Cerebrovascular accident (CVA), unspecified mechanism    lisinopril 20 mg oral tablet  -- 1 tab(s) by mouth once a day  -- Indication: For Hypertension    levETIRAcetam 750 mg oral tablet  -- 1 tab(s) by mouth 2 times a day  -- Indication: For Seizure disorder    lacosamide 200 mg oral tablet  -- 1 tab(s) by mouth 2 times a day  -- Indication: For Seizure disorder    NovoLIN N 100 units/mL subcutaneous suspension  -- 22 unit(s) subcutaneous once a day (in the morning)  -- Indication: For Diabetes mellitus type 2, insulin dependent    NovoLIN N 100 units/mL subcutaneous suspension  -- 20 unit(s) subcutaneous once a day (in the evening)  -- Indication: For Diabetes mellitus type 2, insulin dependent    atorvastatin 80 mg oral tablet  -- 1 tab(s) by mouth once a day (at bedtime)  -- Indication: For Hyperlipidemia, unspecified hyperlipidemia type    metoprolol tartrate 25 mg oral tablet  -- 1 tab(s) by mouth 2 times a day  -- Indication: For Essential hypertension    amLODIPine 10 mg oral tablet  -- 1 tab(s) by mouth once a day  -- Indication: For Essential hypertension    bisacodyl 10 mg rectal suppository  -- 1 suppository(ies) rectally once a day, As needed, Constipation  -- Indication: For Constipation    docusate sodium 100 mg oral capsule  -- 1 cap(s) by mouth 2 times a day  -- Indication: For Constipation    polyethylene glycol 3350 oral powder for reconstitution  -- 17 gram(s) by mouth once a day  -- Indication: For Constipation    Fish Oil oral capsule  -- 1 tab(s) by mouth once a day  -- Indication: For Cerebrovascular accident (CVA), unspecified mechanism    omeprazole 40 mg oral delayed release capsule  -- 1 cap(s) by mouth once a day  -- Indication: For Need for prophylactic measure    multivitamin  -- 1 tab(s) by mouth once a day  -- Indication: For Need for prophylactic measure    Vitamin C  --  by mouth   -- Indication: For Need for prophylactic measure

## 2017-05-16 NOTE — DISCHARGE NOTE ADULT - CARE PLAN
Principal Discharge DX:	Cerebrovascular accident (CVA), unspecified mechanism  Goal:	Participate in acute rehabilitation to help your recovery from stroke, follow up with your neurologist within 2 weeks.  Secondary Diagnosis:	Diabetes mellitus type 2, insulin dependent  Instructions for follow-up, activity and diet:	Please resume taking your home NPH insulin as directed by your primary care physician for diabetes.  Secondary Diagnosis:	Hyperlipidemia, unspecified hyperlipidemia type  Instructions for follow-up, activity and diet:	Please stop taking your home simvastatin and start taking atorvastatin 80mg one tab by mouth daily.  Secondary Diagnosis:	Seizure disorder  Instructions for follow-up, activity and diet:	Please continue taking your home keppra and vimpat as directed by your neurologist. Principal Discharge DX:	Cerebrovascular accident (CVA), unspecified mechanism  Goal:	Participate in acute rehabilitation to help your recovery from stroke, follow up with your neurologist within 2 weeks.  Instructions for follow-up, activity and diet:	You came in because of a new stroke. Please continue to exercise as you are able to.  Secondary Diagnosis:	Diabetes mellitus type 2, insulin dependent  Instructions for follow-up, activity and diet:	Please resume taking your home NPH insulin as directed by your primary care physician for diabetes.  Secondary Diagnosis:	Hyperlipidemia, unspecified hyperlipidemia type  Instructions for follow-up, activity and diet:	Please stop taking your home simvastatin and start taking atorvastatin 80mg one tab by mouth daily.  Secondary Diagnosis:	Seizure disorder  Instructions for follow-up, activity and diet:	Please continue taking your home keppra and vimpat as directed by your neurologist. Principal Discharge DX:	Cerebrovascular accident (CVA), unspecified mechanism  Goal:	Participate in home physical therapy to help your recovery from stroke, follow up with your neurologist within 2 weeks.  Instructions for follow-up, activity and diet:	You came in because of a new stroke. Please continue to exercise as you are able to.  Continue with your care as per your home health care  Secondary Diagnosis:	Diabetes mellitus type 2, insulin dependent  Instructions for follow-up, activity and diet:	Please resume taking your home NPH insulin as directed by your primary care physician for diabetes.  Secondary Diagnosis:	Hyperlipidemia, unspecified hyperlipidemia type  Instructions for follow-up, activity and diet:	Please stop taking your home simvastatin and start taking atorvastatin 80mg one tab by mouth daily.  Secondary Diagnosis:	Seizure disorder  Instructions for follow-up, activity and diet:	Please continue taking your home keppra and vimpat as directed by your neurologist. Principal Discharge DX:	Cerebrovascular accident (CVA), unspecified mechanism  Goal:	Participate in home physical therapy to help your recovery from stroke, follow up with your neurologist within 2 weeks.  Instructions for follow-up, activity and diet:	You came in because of a new stroke. Please continue to exercise as you are able to.  Please increase your aspirin dose to 325mg once a day instead of 81mg once a day  Continue with your care as per your home health care  Secondary Diagnosis:	Diabetes mellitus type 2, insulin dependent  Instructions for follow-up, activity and diet:	Please resume taking your home NPH insulin as directed by your primary care physician for diabetes.  Secondary Diagnosis:	Hyperlipidemia, unspecified hyperlipidemia type  Instructions for follow-up, activity and diet:	Please stop taking your home simvastatin and start taking atorvastatin 80mg one tab by mouth daily.  Secondary Diagnosis:	Seizure disorder  Instructions for follow-up, activity and diet:	Please continue taking your home keppra and vimpat as directed by your neurologist.

## 2017-05-16 NOTE — PROGRESS NOTE ADULT - ASSESSMENT
dementia (A&Ox1 at baseline), HTN, HLD, DM, seizure d/o presenting to OSH with worsening L sided weakness and slurred speech s/p t-PA administration w/ residual LUE and LLE weakness, dysarthria, aphasia, and inattention, admitted for ICU monitoring post-tPA, then 7LA for further monitoring on telemetry; now on [facility] medicine for disposition planning.                Problem/Plan - 1:  ·  Problem: Cerebrovascular accident (CVA), unspecified mechanism.  Plan: MRI and CT findings suggestive of chronic infarctions and currently and acute left frontal infarct. He received TPA for high NIHSS in [facility] and has been negative for rebleeding on FU CAT scan.   - will c/w high dose statin and aspirin.   -SBP goal 100-180. Currently normotensive 120-130.  C/W lisinopril 20 mg and lopressor 25 BID   -Control DM  - will obtain ALAN prior to discharge, if possible.     Problem/Plan - 2:  ·  Problem: Seizure disorder.  Plan: Pt with history of seizure from first episode of CVA    - C/W Vimpat/Keppra home doses.

## 2017-05-16 NOTE — DISCHARGE NOTE ADULT - SECONDARY DIAGNOSIS.
Diabetes mellitus type 2, insulin dependent Hyperlipidemia, unspecified hyperlipidemia type Seizure disorder

## 2017-05-16 NOTE — DISCHARGE NOTE ADULT - CARE PROVIDER_API CALL
Ebonie Smith), Neurology; Vascular Neurology  [address] [place] [address]  Phone: [phone]  Fax: [phone]

## 2017-05-16 NOTE — DISCHARGE NOTE ADULT - PLAN OF CARE
Please continue taking your home keppra and vimpat as directed by your neurologist. Please stop taking your home simvastatin and start taking atorvastatin 80mg one tab by mouth daily. Please resume taking your home NPH insulin as directed by your primary care physician for diabetes. Participate in acute rehabilitation to help your recovery from stroke, follow up with your neurologist within 2 weeks. You came in because of a new stroke. Please continue to exercise as you are able to. Participate in home physical therapy to help your recovery from stroke, follow up with your neurologist within 2 weeks. You came in because of a new stroke. Please continue to exercise as you are able to.  Continue with your care as per your home health care You came in because of a new stroke. Please continue to exercise as you are able to.  Please increase your aspirin dose to 325mg once a day instead of 81mg once a day  Continue with your care as per your home health care

## 2017-05-16 NOTE — DISCHARGE NOTE ADULT - [id number]_GEN_ALL_CORE
observation and assessment/medication teaching and assessment/teaching and training/rehabilitation services

## 2017-05-16 NOTE — DISCHARGE NOTE ADULT - PATIENT PORTAL LINK FT
“You can access the Follow[employer] Patient Portal, offered by [facility], by registering with the following website: [email or web address]”

## 2017-05-16 NOTE — PROGRESS NOTE ADULT - PROBLEM SELECTOR PROBLEM 1
Cerebrovascular accident (CVA), unspecified mechanism
Cerebrovascular accident (CVA), unspecified mechanism

## 2017-05-16 NOTE — PROGRESS NOTE ADULT - SUBJECTIVE AND OBJECTIVE BOX
OVERNIGHT EVENTS:    SUBJECTIVE / INTERVAL HPI: Patient seen and examined at bedside.     VITAL SIGNS:  Vital Signs Last 24 Hrs  T(C): 37, Max: 37.4 (05-15 @ 21:27)  T(F): 98.6, Max: 99.4 (05-15 @ 21:27)  HR: 60 (52 - 68)  BP: 120/70 (111/62 - 134/71)  BP(mean): 81 (81 - 81)  RR: 16 (16 - 18)  SpO2: 94% (94% - 98%)    PHYSICAL EXAM:        MEDICATIONS:  MEDICATIONS  (STANDING):  insulin lispro (HumaLOG) corrective regimen sliding scale  SubCutaneous Before meals and at bedtime  atorvastatin 80milliGRAM(s) Oral at bedtime  metoprolol 25milliGRAM(s) Oral two times a day  pantoprazole    Tablet 40milliGRAM(s) Oral before breakfast  lisinopril 20milliGRAM(s) Oral daily  aspirin enteric coated 81milliGRAM(s) Oral daily  lacosamide 200milliGRAM(s) Oral two times a day  levETIRAcetam 750milliGRAM(s) Oral two times a day  amLODIPine   Tablet 10milliGRAM(s) Oral daily  heparin  Injectable 5000Unit(s) SubCutaneous every 8 hours  polyethylene glycol 3350 17Gram(s) Oral daily  docusate sodium 100milliGRAM(s) Oral two times a day  insulin glargine Injectable (LANTUS) 5Unit(s) SubCutaneous every morning    MEDICATIONS  (PRN):  bisacodyl Suppository 10milliGRAM(s) Rectal daily PRN Constipation      ALLERGIES:  Allergies    No Known Allergies    Intolerances        LABS:                        11.6   7.2   )-----------( 172      ( 16 May 2017 08:30 )             33.9     05-16    141  |  107  |  16  ----------------------------<  165<H>  3.9   |  26  |  1.11    Ca    8.5      16 May 2017 08:30  Mg     1.9     05-16          CAPILLARY BLOOD GLUCOSE  142 (16 May 2017 07:23)      RADIOLOGY & ADDITIONAL TESTS: Reviewed.    ASSESSMENT:    PLAN: OVERNIGHT EVENTS: transferred to regional medicine (see transfer note)    SUBJECTIVE / INTERVAL HPI: Patient seen and examined at bedside.     VITAL SIGNS:  Vital Signs Last 24 Hrs  T(C): 37, Max: 37.4 (05-15 @ 21:27)  T(F): 98.6, Max: 99.4 (05-15 @ 21:27)  HR: 60 (52 - 68)  BP: 120/70 (111/62 - 134/71)  BP(mean): 81 (81 - 81)  RR: 16 (16 - 18)  SpO2: 94% (94% - 98%)    PHYSICAL EXAM:    General: obese male resting comfortably in bed; NAD  HEENT: NCAT, PERRL, anicteric sclera, MMM  Neck: supple, no JVD  Respiratory: CTA B/L; no W/R/R  Cardiovascular: +S1/S2, RRR; grade II/VI XANDER  Abdomen: soft, NT/ND, bowel sounds in all four quadrants, no palpable masses  Extremities: WWP, no clubbing, cyanosis, or edema  Neuro: AAOx1 (name) w/ R facial droop sparing of the forehead, 4/5 left , rigidity in LUE and LLE. sensation intact and symmetric to LT  Musculoskeletal: crepitus on R knee. No joint swelling    MEDICATIONS:  MEDICATIONS  (STANDING):  insulin lispro (HumaLOG) corrective regimen sliding scale  SubCutaneous Before meals and at bedtime  atorvastatin 80milliGRAM(s) Oral at bedtime  metoprolol 25milliGRAM(s) Oral two times a day  pantoprazole    Tablet 40milliGRAM(s) Oral before breakfast  lisinopril 20milliGRAM(s) Oral daily  aspirin enteric coated 81milliGRAM(s) Oral daily  lacosamide 200milliGRAM(s) Oral two times a day  levETIRAcetam 750milliGRAM(s) Oral two times a day  amLODIPine   Tablet 10milliGRAM(s) Oral daily  heparin  Injectable 5000Unit(s) SubCutaneous every 8 hours  polyethylene glycol 3350 17Gram(s) Oral daily  docusate sodium 100milliGRAM(s) Oral two times a day  insulin glargine Injectable (LANTUS) 5Unit(s) SubCutaneous every morning    MEDICATIONS  (PRN):  bisacodyl Suppository 10milliGRAM(s) Rectal daily PRN Constipation      ALLERGIES:  Allergies    No Known Allergies    Intolerances        LABS:                        11.6   7.2   )-----------( 172      ( 16 May 2017 08:30 )             33.9     05-16    141  |  107  |  16  ----------------------------<  165<H>  3.9   |  26  |  1.11    Ca    8.5      16 May 2017 08:30  Mg     1.9     05-16          CAPILLARY BLOOD GLUCOSE  142 (16 May 2017 07:23)      RADIOLOGY & ADDITIONAL TESTS: Reviewed.    ASSESSMENT:    PLAN:

## 2017-05-16 NOTE — PROGRESS NOTE ADULT - PROBLEM SELECTOR PLAN 1
MRI and CT findings suggestive of chronic infarctions and currently and acute left frontal infarct. He received TPA for high NIHSS in [facility] and has been negative for rebleeding on FU CAT scan.   - will c/w high dose statin and aspirin.   -SBP goal 100-180. Currently normotensive 120-130.  C/W lisinopril 20 mg and lopressor 25 BID   -Control DM  - will obtain ALAN prior to discharge, if possible

## 2017-05-16 NOTE — DISCHARGE NOTE ADULT - HOSPITAL COURSE
78 yo M w/ PMH CVA (2008, 2012 w/ residual L sided weakness, wheelchair bound) c/b seizure disorder, dementia (A&Ox1 at baseline), HTN, HLD, DM2, who presented to [facility] w/ slurred speech and agitation different from baseline. Pt at baseline was AAOx1-0 but able to say simple sentences, follow simple commands and move both upper extremities. Wife noticed he was not moving his left arm and his speech was slurred. In [place] ED, noted to have significant left arm weakness, tPA pushed at ~1am on 5/12/17 for NIHSS score of 9. CTA head/neck was negative, transferred to [facility] MICU for post-tPA monitoring, CT head negative for bleed. At [facility], he had a NIH scale score of 14. Post t-PA pt maintained appropriate mentation, answered "im alright" whenever asked. Repeat CT head negative 24 hrs after tPA negative for bleed. Restarted HSQ, aspirin. Stepped down to ICU stepdown unit. Started treatment for constipation. Received MRI which showed acute infarction in the left mesial frontal subcortical white matter and multiple chronic infarcts. PT evaluated pt and recommended acute rehab vs. C[facility] depending on progress. Stepped down to regional medicine for disposition planning... Patient is a 76 yo M w/ PMH CVA (2008, 2012 w/ residual L sided weakness, wheelchair bound) c/b seizure disorder, dementia (A&Ox1 at baseline), HTN, HLD, DM2, who presented to [facility] w/ slurred speech and agitation different from baseline. Pt at baseline was AAOx1-0 but able to say simple sentences, follow simple commands and move both upper extremities. Wife noticed he was not moving his left arm and his speech was slurred. In [place] ED, noted to have significant left arm weakness, tPA pushed at ~1am on 5/12/17 for NIHSS score of 9. CTA head/neck was negative, transferred to [facility] MICU for post-tPA monitoring, CT head negative for bleed. At [facility], he had a NIH scale score of 14. Post t-PA pt maintained appropriate mentation, answered "im alright" whenever asked. Repeat CT head negative 24 hrs after tPA negative for bleed. Restarted HSQ, aspirin. Stepped down to ICU stepdown unit. Started treatment for constipation. Received MRI which showed acute infarction in the left mesial frontal subcortical white matter and multiple chronic infarcts. PT evaluated pt and recommended acute rehab vs. C[facility] depending on progress, however patient's family decided on home with home health care, and patient will be discharged with home health care and outpatient followup.

## 2017-05-16 NOTE — PROGRESS NOTE ADULT - SUBJECTIVE AND OBJECTIVE BOX
Physical Medicine and Rehabilitation Progress Note:    Patient is a 77y old  Male who presents with a chief complaint of slurred speech/L sided weakness/post tPA protocol (16 May 2017 15:33)      HPI:  Patient is a 76yo M w/ PMH CVA (2008, 2012), HTN, HLD, DM2, seizure disorder who presented to [facility] w/ slurred speech and agitation. History obtained from pt's family as pt unable to answer. Pt was in his usual state of health until 10pm when one of his daughters noticed the patient w/ slurred speech and "acting differently." Patient began refusing meds, became fussy and argumentative, which is significantly different from the patient's baseline. EMS was called and patient was brought to [facility]. He was noticed w/ significant left arm weakness worsened from baseline. TPA was pushed at ~1am. CTA was obtained and patient was transferred to [facility] MICU for post-tPA protocol. As per family, no complaints or observations of CP, dyspnea, n/v, ab pain, d/c, syncope, dizziness. On arrival, T: 99.8, P: 94, BP: 125/79, RR: 20 O2: 97% RA    As per pt's wife and other daughter at baseline, pt is AAOx1 (name); bedbound/wheelchair bound; w/ coherent speech w/ mild, occasional slurring; always compliant w/ meds; easy-going mood and affect though w/ infrequent mood lability ever since prior strokes; LUE weakness 5/10 in strength relative to RUE; poor short term memory and occasional lapses of long-term memory. Last seizure in 2015 or 2016; depakote switched at that time to vimpat. Pt has a HHA from 10a-7p 7d/wk. (12 May 2017 04:10)                            11.6   7.2   )-----------( 172      ( 16 May 2017 08:30 )             33.9       05-16    141  |  107  |  16  ----------------------------<  165<H>  3.9   |  26  |  1.11    Ca    8.5      16 May 2017 08:30  Mg     1.9     05-16      Vital Signs Last 24 Hrs  T(C): 37, Max: 37.4 (05-15 @ 21:27)  T(F): 98.6, Max: 99.4 (05-15 @ 21:27)  HR: 60 (56 - 65)  BP: 120/70 (120/70 - 134/71)  BP(mean): --  RR: 16 (16 - 16)  SpO2: 94% (94% - 98%)    MEDICATIONS  (STANDING):  insulin lispro (HumaLOG) corrective regimen sliding scale  SubCutaneous Before meals and at bedtime  atorvastatin 80milliGRAM(s) Oral at bedtime  metoprolol 25milliGRAM(s) Oral two times a day  pantoprazole    Tablet 40milliGRAM(s) Oral before breakfast  lisinopril 20milliGRAM(s) Oral daily  aspirin enteric coated 81milliGRAM(s) Oral daily  lacosamide 200milliGRAM(s) Oral two times a day  levETIRAcetam 750milliGRAM(s) Oral two times a day  amLODIPine   Tablet 10milliGRAM(s) Oral daily  heparin  Injectable 5000Unit(s) SubCutaneous every 8 hours  polyethylene glycol 3350 17Gram(s) Oral daily  docusate sodium 100milliGRAM(s) Oral two times a day  insulin glargine Injectable (LANTUS) 5Unit(s) SubCutaneous every morning    MEDICATIONS  (PRN):  bisacodyl Suppository 10milliGRAM(s) Rectal daily PRN Constipation    Currently Undergoing Physical Therapy at bedside.    Initial Functional Status Assessment:    Previous Level of Function:   · Ambulation Skills	needs device and assist	  · Transfer Skills	needs device and assist	  · ADL Skills	needs device and assist	  · Additional Comments	Pt A&Ox1 (to self) also able to provide his wife and 1 daughters name, able to answer yes and no questions by saying "yes m'am/no m'am" 100% of time, all other questions pt was able to provide an appropriate response to 25% of the time. Unable to obtain full hx of baseline 2/2 pts accent and slurred speech. Pt states that he lives w/ wife in home w/ no stairs. Has a RW for ambulation, states that he is able to amb from bed to bathroom and that there are grab bars in the bathroom. As per H&P pt has HHA 9hrs/day 7 days/wk.	    Cognitive Status Examination:   · Orientation	person	  · Level of Consciousness	alert; confused	  · Follows Commands and Answers Questions	able to follow single-step instructions; unable to follow multi-step instructions; speech unintelligible	    Range of Motion Exam:   · Range of Motion Examination	Decreased PROM in LUE/LLE able to complete full ROM w/ AAROM	    MMT Hip:   · Hip Flexion	(1+) trace plus, left, (4-) good minus, right	    MMT Knee:   · Knee Flexion	(1+) trace plus, left, (4-) good minus, right	  · Knee Extension	(1+) trace plus, left, (4-) good minus, right	    MMT Ankle:   · Ankle Dorsiflexion	(1+) trace plus, left, (4-) good minus, right	    Bed Mobility: Rolling/Turning:   · Level of [place]	maximum assist (25% patients effort)	  · Physical Assist/Nonphysical Assist	2 person assist; nonverbal cues (demo/gestures); verbal cues	  · Assistive Device	bed rails	    Bed Mobility: Scooting/Bridging:   · Level of [place]	maximum assist (25% patients effort)	  · Physical Assist/Nonphysical Assist	2 person assist; nonverbal cues (demo/gestures); verbal cues	    Bed Mobility: Sit to Supine:   · Level of [place]	maximum assist (25% patients effort)	  · Physical Assist/Nonphysical Assist	2 person assist; nonverbal cues (demo/gestures); verbal cues	    Bed Mobility: Supine to Sit:   · Level of [place]	maximum assist (25% patients effort)	  · Physical Assist/Nonphysical Assist	2 person assist; nonverbal cues (demo/gestures); verbal cues	  · Assistive Device	bed rails	    Bed Mobility Analysis:   · Bed Mobility Limitations	decreased ability to use arms for pushing/pulling; decreased ability to use legs for bridging/pushing; impaired ability to control trunk for mobility	  · Impairments Contributing to Impaired Bed Mobility	impaired balance; cognition; impaired coordination; pain; impaired postural control; decreased strength	    Transfer: Sit to Stand:   · Level of [place]	NA	    Gait Skills:   · Level of [place]	NA	  Stair Negotiation:   · Level of [place]	NA	    Balance Skills Assessment:   · Sitting Balance: Static	fair balance	  · Sitting Balance: Dynamic	poor balance	  · Sit-to-Stand Balance	NA	  · Systems Impairment Contributing to Balance Disturbance	neuromuscular	  · Identified Impairments Contributing to Balance Disturbance	decreased strength; impaired postural control; pain; impaired coordination	    Treatment Location:   · Comments	Gaze preference to R visual field, able to overcome and maintain in L visual field w/ max VCs x 15sec	    Clinical Impressions:   · Criteria for Skilled Therapeutic Interventions	impairments found; functional limitations in following categories; risk reduction/prevention; rehab potential; therapy frequency; anticipated discharge recommendation	  · Impairments Found (describe specific impairments)	aerobic capacity/endurance; cognitive impairment; fine motor; gait, locomotion, and balance; muscle strength	  · Functional Limitations in Following Categories (describe specific limitations)	self-care; home management; community/leisure	  · Risk Reduction/Prevention (Describe Specific Areas of risk reduction/prevention)	risk factors	  · Risk Areas	fall; cognitive impairment	  · Rehab Potential	fair, will monitor progress closely	  · Therapy Frequency	2-3x/week	    PM&R Impression: as above    Disposition Plan : d/c nhome, home care services, home physical therapy

## 2017-05-16 NOTE — PROGRESS NOTE ADULT - ASSESSMENT
78yo M w/ PMH prior CVA in 2008 and 2012 w/ residual L sided weakness (wheelchair bound), dementia (A&Ox1 at baseline), HTN, HLD, DM, seizure d/o presenting to OSH with worsening L sided weakness and slurred speech s/p t-PA administration w/ residual LUE and LLE weakness, dysarthria, aphasia, and inattention, admitted for ICU monitoring post-tPA, then 7LA for further monitoring on telemetry; now on regional medicine for disposition planning.

## 2017-05-16 NOTE — PROGRESS NOTE ADULT - SUBJECTIVE AND OBJECTIVE BOX
Stroke Neurology Follow-Up Visit    Transferred from [facility] yesterday. No acute events overnight.   Pt seen and examined.   Awake, alert, following some simple commands. Mostly replying "yes, ma'am" or "ok" to majority of questions.   Appears comfortable. Afebrile.     Exam:  T(F): 98.6, Max: 99.4 (05-15 @ 21:27)  HR: 60 (56 - 68)  BP: 120/70 (120/70 - 134/71)  RR: 16 (16 - 16)  SpO2: 94% (94% - 98%)    Gen: Lying in bed, calm, cooperative, in NAD  Neuro:  Mental status: Awake, alert and oriented to self and to "[place]." Follows some simple commands- stuck out tongue, showed his teeth. Mild dysarthria. Expressive aphasia > receptive aphasia. Replies "yes, ma'am" to majority of questions.   Cranial nerves: Pupils equally round and reactive to light, 3.5 mm, positive blink to threat of all quadrants, no gaze deviation, primary midline gaze, no nystagmus, extraocular muscles intact-looking at all directions spontaneously no facial droop, palate elevation symmetric, tongue was midline  Motor: Difficulty following commands for full motor testing. increased tone of LUE and LLE. LUE with moderately strong hand , 3/5, moving antigravity and purposefully. LLE 2/5, externally rotated. RUE 4/5, strong hand . RLE 4-/5.   Sensation: Withdraws briskly to painful stimuli of all extremities.   Coordination: Unable to follow commands to perform.   Reflexes: upgoing toes   Gait: unable to assess     MEDICATIONS  (STANDING):  insulin lispro (HumaLOG) corrective regimen sliding scale  SubCutaneous Before meals and at bedtime  atorvastatin 80milliGRAM(s) Oral at bedtime  metoprolol 25milliGRAM(s) Oral two times a day  pantoprazole    Tablet 40milliGRAM(s) Oral before breakfast  lisinopril 20milliGRAM(s) Oral daily  aspirin enteric coated 81milliGRAM(s) Oral daily  lacosamide 200milliGRAM(s) Oral two times a day  levETIRAcetam 750milliGRAM(s) Oral two times a day  amLODIPine   Tablet 10milliGRAM(s) Oral daily  heparin  Injectable 5000Unit(s) SubCutaneous every 8 hours  polyethylene glycol 3350 17Gram(s) Oral daily  docusate sodium 100milliGRAM(s) Oral two times a day  insulin glargine Injectable (LANTUS) 5Unit(s) SubCutaneous every morning    MEDICATIONS  (PRN):  bisacodyl Suppository 10milliGRAM(s) Rectal daily PRN Constipation    Labs:                        11.6   7.2   )-----------( 172      ( 16 May 2017 08:30 )             33.9     05-16    141  |  107  |  16  ----------------------------<  165<H>  3.9   |  26  |  1.11    Ca    8.5      16 May 2017 08:30  Mg     1.9     05-16    Thyroid Stimulating Hormone, Serum: 1.556 uIU/mL  Cholesterol, Serum: 113 mg/dL  HDL Cholesterol, Serum: 40 mg/dL  LDL 56   Triglycerides, Serum: 87 mg/dL  Hemoglobin A1C, Whole Blood: 6.9 %    Radiology:  5/12 CTA head/neck (from OSH)  Impression:   No acute findings. No occlusion or significant stenosis.     5/12 CT head:   IMPRESSION: No evidence of intracranial hemorrhage or acute transcortical infarction.    5/12 ECHO:   Normal left ventricular size and wall thickness.The left ventricular wall motion is normal.The left ventricular ejection fraction is estimated to be 55-60%The left atrial size is normal.Right atrial size is normal.The right ventricular systolic function is normal.There is mild aortic valve thickening.There is mild aortic regurgitation.Structurally normal mitral valve.There is trace mitral regurgitation.Structurally normal tricuspid valve.There is mild tricuspid regurgitation.There is no echocardiographic evidence for pulmonary hypertension.Structurally normal pulmonic valve.There is trace pulmonic regurgitation.No aortic root dilatation.There is no pericardial effusion.    5/13 CT head:   IMPRESSION:   1. No acute intracranial hemorrhage or acute transcortical infarction.  2. Generalized volume loss, chronic infarction and microangiopathic ischemic change.  3. Possible component of hydrocephaly. Stable ventricles.    5/14 MRI head w/o:   Impression: 0.3 cm acute infarction in the left mesial frontal subcortical white matter. Severe microvascular disease. Bilateral basal ganglia, internal capsule, external capsule and thalamic chronic lacunar infarctions. Few small cerebellar chronic infarctions.  Left frontal chronic infarct.    Scattered punctate foci of gradient echo susceptibility changes within the bilateral frontal, left parietal, bilateral occipital and left temporal lobes and a few within the brainstem and bilateral cerebellar scattered punctate foci of gradient echo susceptibility changes; findings likely represents hemosiderin from prior microhemorrhage from hypertension, vasculitis, bleeding diathesis, trauma, cavernomas.    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 CTA head/neck without large vessel occlusion. Transferred to [facility] for further management.       MRI head with 0.3 cm small acute infarction in the left mesial frontal subcortical white matter - although would not correlate with worsening L sided weakness. Also with chronic lacunar infarctions bilaterally, few small cerebellar chronic infarcts, and left frontal chronic infarct.   Location and size of acute frontal stroke felt to be secondary to small vessel disease, however will check ALAN to r/o cardioembolic source (although suspicion is low). If unable to be performed prior to discharge, then will arrange to be done as an outpatient.   Continue aspirin 81 mg for antiplatelet.   Continue high dose statin with atorvastatin 80 mg, LDL goal < 70.     Goal BP normotensive. Well controlled on metoprolol, amlodipine, and lisinopril.   DM II - HgA1c 6.9, relatively well controlled, continue ISS, Lantus, and regular fingerstick check     Continue home AEDs.     Evaluated by PT/OT - recommended for CELY. However, family expressed strong wishes to take pt home with home services. Pt does have home care from services 7 days a week from 10 am to 7 pm. Family will supplement remainder of hours. Plan for discharge tomorrow.   Follow up with Dr. Smith (neurology) as an outpatient in 2 weeks Stroke Neurology Follow-Up Visit    Transferred from [facility] yesterday. No acute events overnight.   Pt seen and examined.   Awake, alert, following some simple commands. Mostly replying "yes, ma'am" or "ok" to majority of questions.   Appears comfortable. Afebrile.     Exam:  T(F): 98.6, Max: 99.4 (05-15 @ 21:27)  HR: 60 (56 - 68)  BP: 120/70 (120/70 - 134/71)  RR: 16 (16 - 16)  SpO2: 94% (94% - 98%)    Gen: Lying in bed, calm, cooperative, in NAD  Neuro:  Mental status: Awake, alert and oriented to self and to "[place]." Follows some simple commands- stuck out tongue, showed his teeth. Mild dysarthria. Expressive aphasia > receptive aphasia. Replies "yes, ma'am" to majority of questions.   Cranial nerves: Pupils equally round and reactive to light, 3.5 mm, positive blink to threat of all quadrants, no gaze deviation, primary midline gaze, no nystagmus, extraocular muscles intact-looking at all directions spontaneously no facial droop, palate elevation symmetric, tongue was midline  Motor: Difficulty following commands for full motor testing. increased tone of LUE and LLE. LUE with moderately strong hand , 3/5, moving antigravity and purposefully. LLE 2/5, externally rotated. RUE 4/5, strong hand . RLE 4-/5.   Sensation: Withdraws briskly to painful stimuli of all extremities.   Coordination: Unable to follow commands to perform.   Reflexes: upgoing toes   Gait: unable to assess     MEDICATIONS  (STANDING):  insulin lispro (HumaLOG) corrective regimen sliding scale  SubCutaneous Before meals and at bedtime  atorvastatin 80milliGRAM(s) Oral at bedtime  metoprolol 25milliGRAM(s) Oral two times a day  pantoprazole    Tablet 40milliGRAM(s) Oral before breakfast  lisinopril 20milliGRAM(s) Oral daily  aspirin enteric coated 81milliGRAM(s) Oral daily  lacosamide 200milliGRAM(s) Oral two times a day  levETIRAcetam 750milliGRAM(s) Oral two times a day  amLODIPine   Tablet 10milliGRAM(s) Oral daily  heparin  Injectable 5000Unit(s) SubCutaneous every 8 hours  polyethylene glycol 3350 17Gram(s) Oral daily  docusate sodium 100milliGRAM(s) Oral two times a day  insulin glargine Injectable (LANTUS) 5Unit(s) SubCutaneous every morning    MEDICATIONS  (PRN):  bisacodyl Suppository 10milliGRAM(s) Rectal daily PRN Constipation    Labs:                        11.6   7.2   )-----------( 172      ( 16 May 2017 08:30 )             33.9     05-16    141  |  107  |  16  ----------------------------<  165<H>  3.9   |  26  |  1.11    Ca    8.5      16 May 2017 08:30  Mg     1.9     05-16    Thyroid Stimulating Hormone, Serum: 1.556 uIU/mL  Cholesterol, Serum: 113 mg/dL  HDL Cholesterol, Serum: 40 mg/dL  LDL 56   Triglycerides, Serum: 87 mg/dL  Hemoglobin A1C, Whole Blood: 6.9 %    Radiology:  5/12 CTA head/neck (from OSH)  Impression:   No acute findings. No occlusion or significant stenosis.     5/12 CT head:   IMPRESSION: No evidence of intracranial hemorrhage or acute transcortical infarction.    5/12 ECHO:   Normal left ventricular size and wall thickness.The left ventricular wall motion is normal.The left ventricular ejection fraction is estimated to be 55-60%The left atrial size is normal.Right atrial size is normal.The right ventricular systolic function is normal.There is mild aortic valve thickening.There is mild aortic regurgitation.Structurally normal mitral valve.There is trace mitral regurgitation.Structurally normal tricuspid valve.There is mild tricuspid regurgitation.There is no echocardiographic evidence for pulmonary hypertension.Structurally normal pulmonic valve.There is trace pulmonic regurgitation.No aortic root dilatation.There is no pericardial effusion.    5/13 CT head:   IMPRESSION:   1. No acute intracranial hemorrhage or acute transcortical infarction.  2. Generalized volume loss, chronic infarction and microangiopathic ischemic change.  3. Possible component of hydrocephaly. Stable ventricles.    5/14 MRI head w/o:   Impression: 0.3 cm acute infarction in the left mesial frontal subcortical white matter. Severe microvascular disease. Bilateral basal ganglia, internal capsule, external capsule and thalamic chronic lacunar infarctions. Few small cerebellar chronic infarctions.  Left frontal chronic infarct.    Scattered punctate foci of gradient echo susceptibility changes within the bilateral frontal, left parietal, bilateral occipital and left temporal lobes and a few within the brainstem and bilateral cerebellar scattered punctate foci of gradient echo susceptibility changes; findings likely represents hemosiderin from prior microhemorrhage from hypertension, vasculitis, bleeding diathesis, trauma, cavernomas.    Assessment & Plan:  76yo M w/ PMH prior CVA in 2008 and 2012 w/ residual L sided weakness, dementia (A&Ox1 at baseline), HTN, HLD, DM, seizure disorder presenting to OSH with worsening L sided weakness, agitation, confusion, dysarthria, and word finding difficulties  s/p t-PA administration on 5/12 at 1 am. CTA head/neck without large vessel occlusion. Transferred to [facility] for further management.       MRI head with 0.3 cm small acute infarction in the left mesial frontal subcortical white matter. Also with chronic lacunar infarctions bilaterally, few small cerebellar chronic infarcts, and left frontal chronic infarct.   Location and size of acute frontal stroke felt to be secondary to small vessel disease, however will check ALAN to r/o cardioembolic source (although suspicion is low). If unable to be performed prior to discharge, then will arrange to be done as an outpatient.   Continue aspirin 81 mg for antiplatelet.   Continue high dose statin with atorvastatin 80 mg, LDL goal < 70.     Goal BP normotensive. Well controlled on metoprolol, amlodipine, and lisinopril.   DM II - HgA1c 6.9, relatively well controlled, continue ISS, Lantus, and regular fingerstick check     Continue home AEDs.     Evaluated by PT/OT - recommended for CELY. However, family expressed strong wishes to take pt home with home services. Pt does have home care from services 7 days a week from 10 am to 7 pm. Family will supplement remainder of hours. Plan for discharge tomorrow.   Follow up with Dr. Smith (neurology) as an outpatient in 2 weeks Stroke Neurology Follow-Up Visit    Transferred from [facility] yesterday. No acute events overnight.   Pt seen and examined.   Awake, alert, following some simple commands. Mostly replying "yes, ma'am" or "ok" to majority of questions.   Appears comfortable. Afebrile.     Exam:  T(F): 98.6, Max: 99.4 (05-15 @ 21:27)  HR: 60 (56 - 68)  BP: 120/70 (120/70 - 134/71)  RR: 16 (16 - 16)  SpO2: 94% (94% - 98%)    Gen: Lying in bed, calm, cooperative, in NAD  Neuro:  Mental status: Awake, alert and oriented to self and to "[place]." Follows some simple commands- stuck out tongue, showed his teeth. Mild dysarthria. Expressive aphasia > receptive aphasia. Replies "yes, ma'am" to majority of questions.   Cranial nerves: Pupils equally round and reactive to light, 3.5 mm, positive blink to threat of all quadrants, no gaze deviation, primary midline gaze, no nystagmus, extraocular muscles intact-looking at all directions spontaneously no facial droop, palate elevation symmetric, tongue was midline  Motor: Difficulty following commands for full motor testing. increased tone of LUE and LLE. LUE with moderately strong hand , 3/5, moving antigravity and purposefully. LLE 2/5, externally rotated. RUE 4/5, strong hand . RLE 4-/5.   Sensation: Withdraws briskly to painful stimuli of all extremities.   Coordination: Unable to follow commands to perform.   Reflexes: upgoing toes   Gait: unable to assess     MEDICATIONS  (STANDING):  insulin lispro (HumaLOG) corrective regimen sliding scale  SubCutaneous Before meals and at bedtime  atorvastatin 80milliGRAM(s) Oral at bedtime  metoprolol 25milliGRAM(s) Oral two times a day  pantoprazole    Tablet 40milliGRAM(s) Oral before breakfast  lisinopril 20milliGRAM(s) Oral daily  aspirin enteric coated 81milliGRAM(s) Oral daily  lacosamide 200milliGRAM(s) Oral two times a day  levETIRAcetam 750milliGRAM(s) Oral two times a day  amLODIPine   Tablet 10milliGRAM(s) Oral daily  heparin  Injectable 5000Unit(s) SubCutaneous every 8 hours  polyethylene glycol 3350 17Gram(s) Oral daily  docusate sodium 100milliGRAM(s) Oral two times a day  insulin glargine Injectable (LANTUS) 5Unit(s) SubCutaneous every morning    MEDICATIONS  (PRN):  bisacodyl Suppository 10milliGRAM(s) Rectal daily PRN Constipation    Labs:                        11.6   7.2   )-----------( 172      ( 16 May 2017 08:30 )             33.9     05-16    141  |  107  |  16  ----------------------------<  165<H>  3.9   |  26  |  1.11    Ca    8.5      16 May 2017 08:30  Mg     1.9     05-16    Thyroid Stimulating Hormone, Serum: 1.556 uIU/mL  Cholesterol, Serum: 113 mg/dL  HDL Cholesterol, Serum: 40 mg/dL  LDL 56   Triglycerides, Serum: 87 mg/dL  Hemoglobin A1C, Whole Blood: 6.9 %    Radiology:  5/12 CTA head/neck (from OSH)  Impression:   No acute findings. No occlusion or significant stenosis.     5/12 CT head:   IMPRESSION: No evidence of intracranial hemorrhage or acute transcortical infarction.    5/12 ECHO:   Normal left ventricular size and wall thickness.The left ventricular wall motion is normal.The left ventricular ejection fraction is estimated to be 55-60%The left atrial size is normal.Right atrial size is normal.The right ventricular systolic function is normal.There is mild aortic valve thickening.There is mild aortic regurgitation.Structurally normal mitral valve.There is trace mitral regurgitation.Structurally normal tricuspid valve.There is mild tricuspid regurgitation.There is no echocardiographic evidence for pulmonary hypertension.Structurally normal pulmonic valve.There is trace pulmonic regurgitation.No aortic root dilatation.There is no pericardial effusion.    5/13 CT head:   IMPRESSION:   1. No acute intracranial hemorrhage or acute transcortical infarction.  2. Generalized volume loss, chronic infarction and microangiopathic ischemic change.  3. Possible component of hydrocephaly. Stable ventricles.    5/14 MRI head w/o:   Impression: 0.3 cm acute infarction in the left mesial frontal subcortical white matter. Severe microvascular disease. Bilateral basal ganglia, internal capsule, external capsule and thalamic chronic lacunar infarctions. Few small cerebellar chronic infarctions.  Left frontal chronic infarct.    Scattered punctate foci of gradient echo susceptibility changes within the bilateral frontal, left parietal, bilateral occipital and left temporal lobes and a few within the brainstem and bilateral cerebellar scattered punctate foci of gradient echo susceptibility changes; findings likely represents hemosiderin from prior microhemorrhage from hypertension, vasculitis, bleeding diathesis, trauma, cavernomas.    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 CTA head/neck without large vessel occlusion. Transferred to [facility] for further management.       MRI head with 0.3 cm small acute infarction in the left mesial frontal subcortical white matter. Also with chronic lacunar infarctions bilaterally, few small cerebellar chronic infarcts, and left frontal chronic infarct.   Reviewed images with stroke attending, Dr. Smith. Location and size of acute frontal stroke felt to be secondary to small vessel disease, however will check ALAN to r/o cardioembolic source (although suspicion is low). If unable to be performed prior to discharge, then will arrange to be done as an outpatient.   Continue aspirin 81 mg for antiplatelet.   Continue high dose statin with atorvastatin 80 mg, LDL goal < 70.     Goal BP normotensive. Well controlled on metoprolol, amlodipine, and lisinopril.   DM II - HgA1c 6.9, relatively well controlled, continue ISS, Lantus, and regular fingerstick check     Continue home AEDs.     Evaluated by PT/OT - recommended for CELY. However, family expressed strong wishes to take pt home with home services. Pt does have home care from services 7 days a week from 10 am to 7 pm. Family will supplement remainder of hours. Plan for discharge tomorrow.   Follow up with Dr. Smith (neurology) as an outpatient in 2 weeks

## 2017-05-17 VITALS
TEMPERATURE: 98 F | HEART RATE: 63 BPM | DIASTOLIC BLOOD PRESSURE: 72 MMHG | RESPIRATION RATE: 16 BRPM | SYSTOLIC BLOOD PRESSURE: 121 MMHG | OXYGEN SATURATION: 96 %

## 2017-05-17 LAB
BLD GP AB SCN SERPL QL: NEGATIVE — SIGNIFICANT CHANGE UP
RH IG SCN BLD-IMP: POSITIVE — SIGNIFICANT CHANGE UP

## 2017-05-17 PROCEDURE — C9254: CPT

## 2017-05-17 PROCEDURE — 93306 TTE W/DOPPLER COMPLETE: CPT

## 2017-05-17 PROCEDURE — 93312 ECHO TRANSESOPHAGEAL: CPT | Mod: 26

## 2017-05-17 PROCEDURE — 86900 BLOOD TYPING SEROLOGIC ABO: CPT

## 2017-05-17 PROCEDURE — 70551 MRI BRAIN STEM W/O DYE: CPT

## 2017-05-17 PROCEDURE — 80048 BASIC METABOLIC PNL TOTAL CA: CPT

## 2017-05-17 PROCEDURE — 85730 THROMBOPLASTIN TIME PARTIAL: CPT

## 2017-05-17 PROCEDURE — 93320 DOPPLER ECHO COMPLETE: CPT | Mod: 26

## 2017-05-17 PROCEDURE — 74018 RADEX ABDOMEN 1 VIEW: CPT

## 2017-05-17 PROCEDURE — 97530 THERAPEUTIC ACTIVITIES: CPT

## 2017-05-17 PROCEDURE — 82553 CREATINE MB FRACTION: CPT

## 2017-05-17 PROCEDURE — 93312 ECHO TRANSESOPHAGEAL: CPT

## 2017-05-17 PROCEDURE — 93005 ELECTROCARDIOGRAM TRACING: CPT

## 2017-05-17 PROCEDURE — 86850 RBC ANTIBODY SCREEN: CPT

## 2017-05-17 PROCEDURE — 93325 DOPPLER ECHO COLOR FLOW MAPG: CPT | Mod: 26

## 2017-05-17 PROCEDURE — 84443 ASSAY THYROID STIM HORMONE: CPT

## 2017-05-17 PROCEDURE — 82550 ASSAY OF CK (CPK): CPT

## 2017-05-17 PROCEDURE — 85025 COMPLETE CBC W/AUTO DIFF WBC: CPT

## 2017-05-17 PROCEDURE — 85027 COMPLETE CBC AUTOMATED: CPT

## 2017-05-17 PROCEDURE — 80053 COMPREHEN METABOLIC PANEL: CPT

## 2017-05-17 PROCEDURE — 97162 PT EVAL MOD COMPLEX 30 MIN: CPT

## 2017-05-17 PROCEDURE — 85610 PROTHROMBIN TIME: CPT

## 2017-05-17 PROCEDURE — 84484 ASSAY OF TROPONIN QUANT: CPT

## 2017-05-17 PROCEDURE — 71045 X-RAY EXAM CHEST 1 VIEW: CPT

## 2017-05-17 PROCEDURE — 70450 CT HEAD/BRAIN W/O DYE: CPT

## 2017-05-17 PROCEDURE — 36415 COLL VENOUS BLD VENIPUNCTURE: CPT

## 2017-05-17 PROCEDURE — 83036 HEMOGLOBIN GLYCOSYLATED A1C: CPT

## 2017-05-17 PROCEDURE — 80061 LIPID PANEL: CPT

## 2017-05-17 PROCEDURE — 92523 SPEECH SOUND LANG COMPREHEN: CPT | Mod: GN

## 2017-05-17 PROCEDURE — 86901 BLOOD TYPING SEROLOGIC RH(D): CPT

## 2017-05-17 PROCEDURE — 83735 ASSAY OF MAGNESIUM: CPT

## 2017-05-17 RX ORDER — AMLODIPINE BESYLATE 2.5 MG/1
1 TABLET ORAL
Qty: 0 | Refills: 0 | COMMUNITY

## 2017-05-17 RX ORDER — POLYETHYLENE GLYCOL 3350 17 G/17G
17 POWDER, FOR SOLUTION ORAL
Qty: 0 | Refills: 0 | COMMUNITY
Start: 2017-05-17

## 2017-05-17 RX ORDER — LACOSAMIDE 50 MG/1
1 TABLET ORAL
Qty: 0 | Refills: 0 | DISCHARGE
Start: 2017-05-17

## 2017-05-17 RX ORDER — SIMVASTATIN 20 MG/1
1 TABLET, FILM COATED ORAL
Qty: 0 | Refills: 0 | COMMUNITY

## 2017-05-17 RX ORDER — LEVETIRACETAM 250 MG/1
1 TABLET, FILM COATED ORAL
Qty: 0 | Refills: 0 | DISCHARGE
Start: 2017-05-17

## 2017-05-17 RX ORDER — METOPROLOL TARTRATE 50 MG
1 TABLET ORAL
Qty: 0 | Refills: 0 | COMMUNITY

## 2017-05-17 RX ORDER — ATORVASTATIN CALCIUM 80 MG/1
1 TABLET, FILM COATED ORAL
Qty: 30 | Refills: 0 | OUTPATIENT
Start: 2017-05-17 | End: 2017-06-16

## 2017-05-17 RX ORDER — LISINOPRIL 2.5 MG/1
1 TABLET ORAL
Qty: 0 | Refills: 0 | COMMUNITY
Start: 2017-05-17

## 2017-05-17 RX ORDER — ASPIRIN/CALCIUM CARB/MAGNESIUM 324 MG
1 TABLET ORAL
Qty: 0 | Refills: 0 | COMMUNITY
Start: 2017-05-17

## 2017-05-17 RX ORDER — LISINOPRIL 2.5 MG/1
1 TABLET ORAL
Qty: 0 | Refills: 0 | COMMUNITY

## 2017-05-17 RX ORDER — ASPIRIN/CALCIUM CARB/MAGNESIUM 324 MG
1 TABLET ORAL
Qty: 0 | Refills: 0 | COMMUNITY

## 2017-05-17 RX ORDER — LEVETIRACETAM 250 MG/1
1 TABLET, FILM COATED ORAL
Qty: 0 | Refills: 0 | COMMUNITY

## 2017-05-17 RX ORDER — LACOSAMIDE 50 MG/1
1 TABLET ORAL
Qty: 0 | Refills: 0 | COMMUNITY

## 2017-05-17 RX ORDER — AMLODIPINE BESYLATE 2.5 MG/1
1 TABLET ORAL
Qty: 0 | Refills: 0 | COMMUNITY
Start: 2017-05-17

## 2017-05-17 RX ORDER — METOPROLOL TARTRATE 50 MG
1 TABLET ORAL
Qty: 0 | Refills: 0 | COMMUNITY
Start: 2017-05-17

## 2017-05-17 RX ORDER — ATORVASTATIN CALCIUM 80 MG/1
1 TABLET, FILM COATED ORAL
Qty: 0 | Refills: 0 | COMMUNITY
Start: 2017-05-17

## 2017-05-17 RX ORDER — ASPIRIN/CALCIUM CARB/MAGNESIUM 324 MG
1 TABLET ORAL
Qty: 30 | Refills: 0
Start: 2017-05-17 | End: 2017-06-16

## 2017-05-17 RX ORDER — DOCUSATE SODIUM 100 MG
1 CAPSULE ORAL
Qty: 0 | Refills: 0 | COMMUNITY
Start: 2017-05-17

## 2017-05-17 RX ADMIN — POLYETHYLENE GLYCOL 3350 17 GRAM(S): 17 POWDER, FOR SOLUTION ORAL at 13:22

## 2017-05-17 RX ADMIN — AMLODIPINE BESYLATE 10 MILLIGRAM(S): 2.5 TABLET ORAL at 07:08

## 2017-05-17 RX ADMIN — Medication 25 MILLIGRAM(S): at 07:08

## 2017-05-17 RX ADMIN — LEVETIRACETAM 750 MILLIGRAM(S): 250 TABLET, FILM COATED ORAL at 09:20

## 2017-05-17 RX ADMIN — LACOSAMIDE 200 MILLIGRAM(S): 50 TABLET ORAL at 07:09

## 2017-05-17 RX ADMIN — Medication 81 MILLIGRAM(S): at 13:22

## 2017-05-17 RX ADMIN — INSULIN GLARGINE 5 UNIT(S): 100 INJECTION, SOLUTION SUBCUTANEOUS at 09:20

## 2017-05-17 RX ADMIN — LISINOPRIL 20 MILLIGRAM(S): 2.5 TABLET ORAL at 07:08

## 2017-05-17 NOTE — PROGRESS NOTE ADULT - SUBJECTIVE AND OBJECTIVE BOX
Stroke Neurology Follow-Up Visit    Overnight:    Exam:  T(F): 98.2, Max: 98.9 (05-16 @ 17:52)  HR: 63 (63 - 79)  BP: 121/72 (117/66 - 125/73)  RR: 16 (16 - 16)  SpO2: 96% (94% - 96%)    Gen: Sitting up in bed, calm, cooperative, in NAD  Neuro:  Mental status: Awake, alert and oriented to self and to "[place]." Follows more commands today, would lift extremities to command, stick out tongue. Mild dysarthria. Expressive aphasia > receptive aphasia.   Cranial nerves: Pupils equally round and reactive to light, 3.5 mm, positive blink to threat of all quadrants, no gaze deviation, primary midline gaze, no nystagmus, extraocular muscles intact-looking at all directions spontaneously no facial droop, palate elevation symmetric, tongue was midline  Motor:  No pronator drift of upper extremities. Mildly increased tone of LUE and LLE. LUE with moderately strong hand , 4-/5 bicep, 4/5 tricep, 3/5 deltoid. LLE 2/5, externally rotated. RUE 4+/5, strong hand . RLE 3/5, although giving poor effort.   Sensation: Withdraws briskly to painful stimuli of all extremities.   Coordination: Unable to follow commands to perform.   Reflexes: upgoing toes   Gait: unable to assess     MEDICATIONS  (STANDING):  insulin lispro (HumaLOG) corrective regimen sliding scale  SubCutaneous Before meals and at bedtime  atorvastatin 80milliGRAM(s) Oral at bedtime  metoprolol 25milliGRAM(s) Oral two times a day  pantoprazole    Tablet 40milliGRAM(s) Oral before breakfast  lisinopril 20milliGRAM(s) Oral daily  aspirin enteric coated 81milliGRAM(s) Oral daily  lacosamide 200milliGRAM(s) Oral two times a day  levETIRAcetam 750milliGRAM(s) Oral two times a day  amLODIPine   Tablet 10milliGRAM(s) Oral daily  heparin  Injectable 5000Unit(s) SubCutaneous every 8 hours  polyethylene glycol 3350 17Gram(s) Oral daily  docusate sodium 100milliGRAM(s) Oral two times a day  insulin glargine Injectable (LANTUS) 5Unit(s) SubCutaneous every morning    MEDICATIONS  (PRN):  bisacodyl Suppository 10milliGRAM(s) Rectal daily PRN Constipation      Labs:                        11.6   7.2   )-----------( 172      ( 16 May 2017 08:30 )             33.9     05-17    141  |  106  |  18  ----------------------------<  127<H>  4.5   |  23  |  1.10    Ca    9.2      17 May 2017 12:38  Mg     1.8     05-17    PT/INR - ( 17 May 2017 12:37 )   PT: 12.9 sec;   INR: 1.16     PTT - ( 17 May 2017 12:37 )  PTT:33.1 sec    Thyroid Stimulating Hormone, Serum: 1.556 uIU/mL  Cholesterol, Serum: 113 mg/dL  HDL Cholesterol, Serum: 40 mg/dL  LDL 56   Triglycerides, Serum: 87 mg/dL  Hemoglobin A1C, Whole Blood: 6.9 %    Radiology:  5/12 CTA head/neck (from OSH)  Impression:   No acute findings. No occlusion or significant stenosis.     5/12 CT head:   IMPRESSION: No evidence of intracranial hemorrhage or acute transcortical infarction.    5/12 ECHO:   Normal left ventricular size and wall thickness.The left ventricular wall motion is normal.The left ventricular ejection fraction is estimated to be 55-60%The left atrial size is normal.Right atrial size is normal.The right ventricular systolic function is normal.There is mild aortic valve thickening.There is mild aortic regurgitation.Structurally normal mitral valve.There is trace mitral regurgitation.Structurally normal tricuspid valve.There is mild tricuspid regurgitation.There is no echocardiographic evidence for pulmonary hypertension.Structurally normal pulmonic valve.There is trace pulmonic regurgitation.No aortic root dilatation.There is no pericardial effusion.    5/13 CT head:   IMPRESSION:   1. No acute intracranial hemorrhage or acute transcortical infarction.  2. Generalized volume loss, chronic infarction and microangiopathic ischemic change.  3. Possible component of hydrocephaly. Stable ventricles.    5/14 MRI head w/o:   Impression: 0.3 cm acute infarction in the left mesial frontal subcortical white matter. Severe microvascular disease. Bilateral basal ganglia, internal capsule, external capsule and thalamic chronic lacunar infarctions. Few small cerebellar chronic infarctions.  Left frontal chronic infarct.    Scattered punctate foci of gradient echo susceptibility changes within the bilateral frontal, left parietal, bilateral occipital and left temporal lobes and a few within the brainstem and bilateral cerebellar scattered punctate foci of gradient echo susceptibility changes; findings likely represents hemosiderin from prior microhemorrhage from hypertension, vasculitis, bleeding diathesis, trauma, cavernomas.    5/17 ALAN: Normal left ventricular size and wall thickness.The left ventricular wall motion is normal.The left ventricular ejection fraction is normal.The left atrial size is normal.The interatrial septum is intact with no color Doppler evidence for interatrial shunting  Injection of agitated saline contrast documented no interatrial shunt.Right atrialsize is normal.The right ventricle is normal in size and function.Structurally normal aortic valve.There is mild aortic   regurgitation.Structurally normal mitral valve.There is mild mitral regurgitation.Structurally normal tricuspid valve.No aortic root dilatation.Moderate atherosclerotic plaque(s) in the descending aorta.Moderate atherosclerotic plaque(s) in the aortic arch.There is no pericardial effusion.    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 CTA head/neck without large vessel occlusion. Transferred to [facility] for further management.       MRI head with 0.3 cm small acute infarction in the left mesial frontal subcortical white matter. Also with chronic lacunar infarctions bilaterally, few small cerebellar chronic infarcts, and left frontal chronic infarct.   Reviewed images with stroke attending, Dr. Smith. Location and size of acute frontal stroke felt to be secondary to small vessel disease. ALAN obtained today, unremarkable.    Pt was on aspirin 81 mg prior to admission - daughter states pt was compliant. Will increase dose to 325 mg aspirin daily for stronger antiplatelet coverage.   Continue high dose statin with atorvastatin 80 mg, LDL goal < 70.     Goal BP normotensive. Well controlled on metoprolol, amlodipine, and lisinopril.   DM II - HgA1c 6.9, relatively well controlled, continue ISS, Lantus, and regular fingerstick check     Continue home AEDs.     Evaluated by PT/OT - recommended for CELY. However, family expressed strong wishes to take pt home with home services. Pt does have home care from services 7 days a week from 10 am to 7 pm. Family will supplement remainder of hours. Plan for discharge today.     Daughter states pt has a neurologist already. Has already made an appointment with Dr. Austin at [facility] the first week of June and will follow up there. Stroke Neurology Follow-Up Visit    No acute events overnight.   Pt seen and examined. Daughter at bedside. Went for ALAN today.  Pt appears more interactive and talkative with daughter at bedside. Daughter states he was very conversant earlier today.   Denies pain, headache, nausea, vomiting, new weakness, or numbness.     Exam:  T(F): 98.2, Max: 98.9 (05-16 @ 17:52)  HR: 63 (63 - 79)  BP: 121/72 (117/66 - 125/73)  RR: 16 (16 - 16)  SpO2: 96% (94% - 96%)    Gen: Sitting up in bed, calm, cooperative, in NAD  Neuro:  Mental status: Awake, alert and oriented to self and to "[place]." Follows more commands today, would lift extremities to command, stick out tongue. Mild dysarthria. Expressive aphasia > receptive aphasia.   Cranial nerves: Pupils equally round and reactive to light, 3.5 mm, positive blink to threat of all quadrants, no gaze deviation, primary midline gaze, no nystagmus, extraocular muscles intact-looking at all directions spontaneously, no facial droop, palate elevation symmetric, tongue was midline  Motor:  No pronator drift of upper extremities. Mildly increased tone of LUE and LLE. LUE with moderately strong hand , 4-/5 bicep, 4/5 tricep, 3/5 deltoid. LLE 2/5, externally rotated. RUE 4+/5, strong hand . RLE 3/5, although giving poor effort.   Sensation: Withdraws briskly to painful stimuli of all extremities.   Coordination: Unable to follow commands to perform.   Reflexes: upgoing toes   Gait: unable to assess     MEDICATIONS  (STANDING):  insulin lispro (HumaLOG) corrective regimen sliding scale  SubCutaneous Before meals and at bedtime  atorvastatin 80milliGRAM(s) Oral at bedtime  metoprolol 25milliGRAM(s) Oral two times a day  pantoprazole    Tablet 40milliGRAM(s) Oral before breakfast  lisinopril 20milliGRAM(s) Oral daily  aspirin enteric coated 81milliGRAM(s) Oral daily  lacosamide 200milliGRAM(s) Oral two times a day  levETIRAcetam 750milliGRAM(s) Oral two times a day  amLODIPine   Tablet 10milliGRAM(s) Oral daily  heparin  Injectable 5000Unit(s) SubCutaneous every 8 hours  polyethylene glycol 3350 17Gram(s) Oral daily  docusate sodium 100milliGRAM(s) Oral two times a day  insulin glargine Injectable (LANTUS) 5Unit(s) SubCutaneous every morning    MEDICATIONS  (PRN):  bisacodyl Suppository 10milliGRAM(s) Rectal daily PRN Constipation      Labs:                        11.6   7.2   )-----------( 172      ( 16 May 2017 08:30 )             33.9     05-17    141  |  106  |  18  ----------------------------<  127<H>  4.5   |  23  |  1.10    Ca    9.2      17 May 2017 12:38  Mg     1.8     05-17    PT/INR - ( 17 May 2017 12:37 )   PT: 12.9 sec;   INR: 1.16     PTT - ( 17 May 2017 12:37 )  PTT:33.1 sec    Thyroid Stimulating Hormone, Serum: 1.556 uIU/mL  Cholesterol, Serum: 113 mg/dL  HDL Cholesterol, Serum: 40 mg/dL  LDL 56   Triglycerides, Serum: 87 mg/dL  Hemoglobin A1C, Whole Blood: 6.9 %    Radiology:  5/12 CTA head/neck (from OSH)  Impression:   No acute findings. No occlusion or significant stenosis.     5/12 CT head:   IMPRESSION: No evidence of intracranial hemorrhage or acute transcortical infarction.    5/12 ECHO:   Normal left ventricular size and wall thickness.The left ventricular wall motion is normal.The left ventricular ejection fraction is estimated to be 55-60%The left atrial size is normal.Right atrial size is normal.The right ventricular systolic function is normal.There is mild aortic valve thickening.There is mild aortic regurgitation.Structurally normal mitral valve.There is trace mitral regurgitation.Structurally normal tricuspid valve.There is mild tricuspid regurgitation.There is no echocardiographic evidence for pulmonary hypertension.Structurally normal pulmonic valve.There is trace pulmonic regurgitation.No aortic root dilatation.There is no pericardial effusion.    5/13 CT head:   IMPRESSION:   1. No acute intracranial hemorrhage or acute transcortical infarction.  2. Generalized volume loss, chronic infarction and microangiopathic ischemic change.  3. Possible component of hydrocephaly. Stable ventricles.    5/14 MRI head w/o:   Impression: 0.3 cm acute infarction in the left mesial frontal subcortical white matter. Severe microvascular disease. Bilateral basal ganglia, internal capsule, external capsule and thalamic chronic lacunar infarctions. Few small cerebellar chronic infarctions.  Left frontal chronic infarct.    Scattered punctate foci of gradient echo susceptibility changes within the bilateral frontal, left parietal, bilateral occipital and left temporal lobes and a few within the brainstem and bilateral cerebellar scattered punctate foci of gradient echo susceptibility changes; findings likely represents hemosiderin from prior microhemorrhage from hypertension, vasculitis, bleeding diathesis, trauma, cavernomas.    5/17 ALAN: Normal left ventricular size and wall thickness.The left ventricular wall motion is normal.The left ventricular ejection fraction is normal.The left atrial size is normal.The interatrial septum is intact with no color Doppler evidence for interatrial shunting  Injection of agitated saline contrast documented no interatrial shunt.Right atrialsize is normal.The right ventricle is normal in size and function.Structurally normal aortic valve.There is mild aortic   regurgitation.Structurally normal mitral valve.There is mild mitral regurgitation.Structurally normal tricuspid valve.No aortic root dilatation.Moderate atherosclerotic plaque(s) in the descending aorta.Moderate atherosclerotic plaque(s) in the aortic arch.There is no pericardial effusion.    Assessment & Plan:  76yo M w/ PMH prior CVA in 2008 and 2012 w/ residual L sided weakness, dementia (A&Ox1 at baseline), HTN, HLD, DM, seizure disorder presenting to OSH with worsening L sided weakness, agitation, confusion, dysarthria, and word finding difficulties  s/p t-PA administration on 5/12 at 1 am. CTA head/neck without large vessel occlusion. Transferred to [facility] for further management.       MRI head with 0.3 cm small acute infarction in the left mesial frontal subcortical white matter. Also with chronic lacunar infarctions bilaterally, few small cerebellar chronic infarcts, and left frontal chronic infarct.   Reviewed images with stroke attending, Dr. Smith. Location and size of acute frontal stroke felt to be secondary to small vessel disease. ALAN obtained today, unremarkable.    Pt was on aspirin 81 mg prior to admission - daughter states pt was compliant. Will increase dose to 325 mg aspirin daily for stronger antiplatelet coverage.   Continue high dose statin with atorvastatin 80 mg, LDL goal < 70.     Goal BP normotensive. Well controlled on metoprolol, amlodipine, and lisinopril.   DM II - HgA1c 6.9, relatively well controlled, continue ISS, Lantus, and regular fingerstick check     Continue home AEDs.     Evaluated by PT/OT - recommended for CELY. However, family expressed strong wishes to take pt home with home services. Pt baseline wheelchair bound. Pt does have home care from services 7 days a week from 10 am to 7 pm. Family will supplement remainder of hours. Plan for discharge today.     Daughter states pt has a neurologist already. Has already made an appointment with Dr. Austin at [facility] the first week of June and will follow up there.   NIHSS 9 and MRS 4 on discharge day.

## 2017-05-20 DIAGNOSIS — I63.9 CEREBRAL INFARCTION, UNSPECIFIED: ICD-10-CM

## 2017-05-20 DIAGNOSIS — Z87.891 PERSONAL HISTORY OF NICOTINE DEPENDENCE: ICD-10-CM

## 2017-05-20 DIAGNOSIS — I10 ESSENTIAL (PRIMARY) HYPERTENSION: ICD-10-CM

## 2017-05-20 DIAGNOSIS — Z79.4 LONG TERM (CURRENT) USE OF INSULIN: ICD-10-CM

## 2017-05-20 DIAGNOSIS — G40.909 EPILEPSY, UNSPECIFIED, NOT INTRACTABLE, WITHOUT STATUS EPILEPTICUS: ICD-10-CM

## 2017-05-20 DIAGNOSIS — G81.94 HEMIPLEGIA, UNSPECIFIED AFFECTING LEFT NONDOMINANT SIDE: ICD-10-CM

## 2017-05-20 DIAGNOSIS — F03.90 UNSPECIFIED DEMENTIA, UNSPECIFIED SEVERITY, WITHOUT BEHAVIORAL DISTURBANCE, PSYCHOTIC DISTURBANCE, MOOD DISTURBANCE, AND ANXIETY: ICD-10-CM

## 2017-05-20 DIAGNOSIS — R47.1 DYSARTHRIA AND ANARTHRIA: ICD-10-CM

## 2017-05-20 DIAGNOSIS — E11.9 TYPE 2 DIABETES MELLITUS WITHOUT COMPLICATIONS: ICD-10-CM

## 2017-05-20 DIAGNOSIS — Z87.11 PERSONAL HISTORY OF PEPTIC ULCER DISEASE: ICD-10-CM

## 2017-05-20 DIAGNOSIS — Z99.3 DEPENDENCE ON WHEELCHAIR: ICD-10-CM

## 2017-05-20 DIAGNOSIS — K59.00 CONSTIPATION, UNSPECIFIED: ICD-10-CM

## 2017-05-20 DIAGNOSIS — Z79.82 LONG TERM (CURRENT) USE OF ASPIRIN: ICD-10-CM

## 2017-05-20 DIAGNOSIS — R47.01 APHASIA: ICD-10-CM

## 2017-05-20 DIAGNOSIS — E78.5 HYPERLIPIDEMIA, UNSPECIFIED: ICD-10-CM

## 2017-05-20 DIAGNOSIS — Z92.82 STATUS POST ADMINISTRATION OF TPA (RTPA) IN A DIFFERENT FACILITY WITHIN THE LAST 24 HOURS PRIOR TO ADMISSION TO CURRENT FACILITY: ICD-10-CM

## 2018-02-13 NOTE — PATIENT PROFILE ADULT. - NS PRO ABUSE SCREEN SUSPICION NEGLECT YN
A catheter (Catheter 145d Pgtl Crv 6fr .051in .042in 110cm Sup)  was used to cross the aortic valve and perform left ventriculography by power injection.  unable to assess

## 2020-03-13 NOTE — PATIENT PROFILE ADULT. - AS SC [facility] [employer]
Attempted to reach the patient.  No answer. Left message for patient to return the call to discuss the results listed below. Clinic number provided.     (2) very limited

## 2021-08-24 NOTE — PROGRESS NOTE ADULT - PROVIDER SPECIALTY LIST ADULT
Emi Chau is a 51 y.o. female.   Chief Complaint   Patient presents with   • Basal Cell Carcinoma     2 wk f/u       History of Present Illness   Patient is here for follow up for basal cell carcinoma, she did see the surgeon, is scheduled for surgery next month to remove a squamous cell carcinoma.  Also complaint of pelvic discomfort, been going on for 3 months consistently, off and on prior to that. Went through menopause 5 years ago. Normal PAP 2 years ago.   Has not had the COVID vaccines, is willing to go ahead and start today.      The following portions of the patient's history were reviewed and updated as appropriate: allergies, current medications, past family history, past medical history, past social history, past surgical history and problem list.    Review of Systems   Constitutional: Positive for fatigue. Negative for chills and fever.   Respiratory: Negative for shortness of breath.    Cardiovascular: Negative for chest pain.   Gastrointestinal: Positive for abdominal pain (pelvic discomfort) and constipation.   Endocrine: Positive for heat intolerance.   Genitourinary: Negative for difficulty urinating, dysuria and vaginal bleeding.   Musculoskeletal: Positive for arthralgias and back pain.   Skin: Positive for wound (healing skin cancer sites).   Neurological: Negative for dizziness, light-headedness and headaches.   Psychiatric/Behavioral: Negative for self-injury, sleep disturbance and suicidal ideas. The patient is nervous/anxious.        Objective   Physical Exam  Vitals reviewed.   Constitutional:       Appearance: Normal appearance.   HENT:      Head: Normocephalic and atraumatic.   Cardiovascular:      Rate and Rhythm: Normal rate and regular rhythm.   Pulmonary:      Effort: Pulmonary effort is normal.      Breath sounds: Normal breath sounds.   Abdominal:      General: Bowel sounds are normal. There is no distension.      Palpations: Abdomen is soft.      Tenderness: 
Internal Medicine
MICU
NSICU
NSICU
Neurology
Rehab Medicine
"There is abdominal tenderness (pelvic area). There is no guarding or rebound.   Skin:     General: Skin is warm and dry.      Findings: Lesion present.   Neurological:      Mental Status: She is alert.   Psychiatric:         Mood and Affect: Mood is anxious.       /80   Pulse 70   Ht 161 cm (63.39\")   Wt 61.5 kg (135 lb 9.6 oz)   SpO2 98%   BMI 23.73 kg/m²     Assessment/Plan   Diagnoses and all orders for this visit:    1. Skin cancer (Primary)    2. Tenderness of female pelvic organs  -     US Non-ob Transvaginal; Future    3. Chronic low back pain, unspecified back pain laterality, unspecified whether sciatica present  -     US Non-ob Transvaginal; Future    4. COVID-19 vaccine series started  -     COVID-19 Vaccine ([employer])        Follow up with dermatologist and plastic surgeon for skin cancers on back, wear sunscreen and skin protection when outside  US ordered to evaluate pelvic tenderness and low back pain  covid vaccines, first dose given  Patient was encouraged to keep me informed of any acute changes, lack of improvement, or any new concerning symptoms.         "
Neurology

## 2022-01-01 NOTE — STROKE CODE NOTE - DISPOSITION
Discharge Instructions  You may not be sure when your baby is sick and needs to see a doctor, especially if this is your first baby.  DO call your clinic if you are worried about your baby s health.  Most clinics have a 24-hour nurse help line. They are able to answer your questions or reach your doctor 24 hours a day. It is best to call your doctor or clinic instead of the hospital. We are here to help you.    Call 911 if your baby:  Is limp and floppy  Has  stiff arms or legs or repeated jerking movements  Arches his or her back repeatedly  Has a high-pitched cry  Has bluish skin  or looks very pale    Call your baby s doctor or go to the emergency room right away if your baby:  Has a high fever: Rectal temperature of 100.4 degrees F (38 degrees C) or higher or underarm temperature of 99 degree F (37.2 C) or higher.  Has skin that looks yellow, and the baby seems very sleepy.  Has an infection (redness, swelling, pain) around the umbilical cord or circumcised penis OR bleeding that does not stop after a few minutes.    Call your baby s clinic if you notice:  A low rectal temperature of (97.5 degrees F or 36.4 degree C).  Changes in behavior.  For example, a normally quiet baby is very fussy and irritable all day, or an active baby is very sleepy and limp.  Vomiting. This is not spitting up after feedings, which is normal, but actually throwing up the contents of the stomach.  Diarrhea (watery stools) or constipation (hard, dry stools that are difficult to pass).  stools are usually quite soft but should not be watery.  Blood or mucus in the stools.  Coughing or breathing changes (fast breathing, forceful breathing, or noisy breathing after you clear mucus from the nose).  Feeding problems with a lot of spitting up.  Your baby does not want to feed for more than 6 to 8 hours or has fewer diapers than expected in a 24 hour period.  Refer to the feeding log for expected number of wet diapers in the  first days of life.    If you have any concerns about hurting yourself of the baby, call your doctor right away.      Baby's Birth Weight: 6 lb 11.6 oz (3050 g)  Baby's Discharge Weight: 2.796 kg (6 lb 2.6 oz)    Recent Labs   Lab Test 10/22/22  1935 10/22/22  0741   DBIL  --  0.1   BILITOTAL 9.9* 7.9       Immunization History   Administered Date(s) Administered    Hep B, Peds or Adolescent 2022       Hearing Screen Date: 10/21/22   Hearing Screen, Left Ear: passed  Hearing Screen, Right Ear: passed     Umbilical Cord: drying    Pulse Oximetry Screen Result: pass  (right arm): 98 %  (foot): 100 %    Car Seat Testing Results:      Date and Time of [place] Metabolic Screen: 10/22/22 0741     ID Band Number ________  I have checked to make sure that this is my baby.      [place] Jaundice    Jaundice is when the skin and the whites of the eyes turn yellow. It happens if there is a high level of a substance called bilirubin in the blood. It is fairly common in newborns. It may be the sign of a problem with blood cells or the liver.   As red blood cells break down in the bloodstream and are replaced with new ones, bilirubin is released. It is the job of the liver to remove bilirubin from the bloodstream. The liver of a  may be too immature to remove bilirubin as fast as it forms. Also, newborns have more red blood cells that turn over more often, producing more bilirubin. If enough bilirubin builds up in the blood, it may cause jaundice. The skin and the whites of the eyes may appear yellow. Jaundice may be noticed in the face first. It may then progress down the chest and rest of the body.   Most cases of jaundice are mild. For this reason, no treatment is often needed. The yellow color goes away on its own as the baby s liver starts working better. This may take a few weeks.   If bilirubin levels are high, your baby will need treatment. This helps prevent serious problems that can affect your baby s brain  and nervous system. Phototherapy is the most common treatment used. For this, your baby s skin is exposed to a special light. The light changes the bilirubin to a substance that can be easily removed from the body. In some cases, other forms of phototherapy (such as a light-emitting blanket or mattress) may be used. The healthcare provider will tell you more about these options, if needed.    Your baby may need to stay in the hospital during treatment. In severe cases, additional treatments may be needed.   Home care  Phototherapy may sometimes be done at home. If this is prescribed for your baby, be sure to follow all the instructions you receive from the healthcare provider.  If you are breastfeeding, nurse your baby when they are showing feeding cues, about 8 to 12 times a day. This averages out to every 2 to 3 hours. Feeding helps the baby's body get rid of the bilirubin in the stool and urine, so babies who aren't getting enough milk have a higher risk for jaundice. If you are having trouble breastfeeding, talk with your healthcare provider.  If you are bottle-feeding, follow the healthcare provider s instructions about how much formula to give your child and how often.    Follow-up care  Follow up with the healthcare provider as directed. Your baby may need to have repeat tests to check bilirubin levels.   When to call your healthcare provider  Call the healthcare provider right away if:  Your baby is under 3 months of age and has a fever of 100.4 F (38 C) or higher. Get medical care right away. Fever in a young baby can be a sign of a dangerous infection.  Your baby or child is of any age and has repeated fevers above 104 F (40 C).  Your baby s jaundice becomes worse. This means the skin becomes more yellow or yellow color starts spreading to other parts of the body.  The whites of your baby s eyes become more yellow.  Your baby is not waking to feed or not able to feed.  Your baby is not gaining weight or is  losing weight.  Your baby has fewer wet diapers than normal.  Your baby's stool does not become yellow after the first couple of days, looks pale or greyish, or both.   Your baby is more sleepy than normal or the legs and arms appear floppy.  Your baby s back or neck stays arched backward.  Your baby stays fussy or won t stop crying.  Your baby looks or acts sick or unwell.  [employer] last reviewed this educational content on 8/1/2020 2000-2021 [employer]. All rights reserved. This information is not intended as a substitute for professional medical care. Always follow your healthcare professional's instructions.         Intensive Care Unit

## 2022-04-02 ENCOUNTER — INPATIENT (INPATIENT)
Facility: HOSPITAL | Age: 82
LOS: 2 days | Discharge: HOME HEALTH | End: 2022-04-05
Attending: INTERNAL MEDICINE | Admitting: INTERNAL MEDICINE
Payer: MEDICAID

## 2022-04-02 VITALS — HEIGHT: 68 IN | WEIGHT: 220.02 LBS

## 2022-04-02 LAB
ALBUMIN SERPL ELPH-MCNC: 3.7 G/DL — SIGNIFICANT CHANGE UP (ref 3.3–5)
ALP SERPL-CCNC: 68 U/L — SIGNIFICANT CHANGE UP (ref 40–120)
ALT FLD-CCNC: 26 U/L — SIGNIFICANT CHANGE UP (ref 12–78)
ANION GAP SERPL CALC-SCNC: 14 MMOL/L — SIGNIFICANT CHANGE UP (ref 5–17)
APTT BLD: 33.6 SEC — SIGNIFICANT CHANGE UP (ref 27.5–35.5)
AST SERPL-CCNC: 27 U/L — SIGNIFICANT CHANGE UP (ref 15–37)
BASOPHILS # BLD AUTO: 0.02 K/UL — SIGNIFICANT CHANGE UP (ref 0–0.2)
BASOPHILS NFR BLD AUTO: 0.3 % — SIGNIFICANT CHANGE UP (ref 0–2)
BILIRUB SERPL-MCNC: 0.3 MG/DL — SIGNIFICANT CHANGE UP (ref 0.2–1.2)
BUN SERPL-MCNC: 16 MG/DL — SIGNIFICANT CHANGE UP (ref 7–23)
CALCIUM SERPL-MCNC: 9.2 MG/DL — SIGNIFICANT CHANGE UP (ref 8.5–10.1)
CHLORIDE SERPL-SCNC: 102 MMOL/L — SIGNIFICANT CHANGE UP (ref 96–108)
CO2 SERPL-SCNC: 22 MMOL/L — SIGNIFICANT CHANGE UP (ref 22–31)
CREAT SERPL-MCNC: 1.37 MG/DL — HIGH (ref 0.5–1.3)
EGFR: 52 ML/MIN/1.73M2 — LOW
EOSINOPHIL # BLD AUTO: 0.26 K/UL — SIGNIFICANT CHANGE UP (ref 0–0.5)
EOSINOPHIL NFR BLD AUTO: 3.4 % — SIGNIFICANT CHANGE UP (ref 0–6)
FLUAV AG NPH QL: SIGNIFICANT CHANGE UP
FLUBV AG NPH QL: SIGNIFICANT CHANGE UP
GLUCOSE BLDC GLUCOMTR-MCNC: 135 MG/DL — HIGH (ref 70–99)
GLUCOSE SERPL-MCNC: 124 MG/DL — HIGH (ref 70–99)
HCT VFR BLD CALC: 39.6 % — SIGNIFICANT CHANGE UP (ref 39–50)
HGB BLD-MCNC: 13.4 G/DL — SIGNIFICANT CHANGE UP (ref 13–17)
IMM GRANULOCYTES NFR BLD AUTO: 0.4 % — SIGNIFICANT CHANGE UP (ref 0–1.5)
INR BLD: 1.11 RATIO — SIGNIFICANT CHANGE UP (ref 0.88–1.16)
LYMPHOCYTES # BLD AUTO: 1.9 K/UL — SIGNIFICANT CHANGE UP (ref 1–3.3)
LYMPHOCYTES # BLD AUTO: 24.7 % — SIGNIFICANT CHANGE UP (ref 13–44)
MCHC RBC-ENTMCNC: 30.7 PG — SIGNIFICANT CHANGE UP (ref 27–34)
MCHC RBC-ENTMCNC: 33.8 G/DL — SIGNIFICANT CHANGE UP (ref 32–36)
MCV RBC AUTO: 90.6 FL — SIGNIFICANT CHANGE UP (ref 80–100)
MONOCYTES # BLD AUTO: 0.47 K/UL — SIGNIFICANT CHANGE UP (ref 0–0.9)
MONOCYTES NFR BLD AUTO: 6.1 % — SIGNIFICANT CHANGE UP (ref 2–14)
NEUTROPHILS # BLD AUTO: 5.01 K/UL — SIGNIFICANT CHANGE UP (ref 1.8–7.4)
NEUTROPHILS NFR BLD AUTO: 65.1 % — SIGNIFICANT CHANGE UP (ref 43–77)
NRBC # BLD: 0 /100 WBCS — SIGNIFICANT CHANGE UP (ref 0–0)
PLATELET # BLD AUTO: 181 K/UL — SIGNIFICANT CHANGE UP (ref 150–400)
POTASSIUM SERPL-MCNC: 3.4 MMOL/L — LOW (ref 3.5–5.3)
POTASSIUM SERPL-SCNC: 3.4 MMOL/L — LOW (ref 3.5–5.3)
PROT SERPL-MCNC: 8.1 GM/DL — SIGNIFICANT CHANGE UP (ref 6–8.3)
PROTHROM AB SERPL-ACNC: 13.2 SEC — SIGNIFICANT CHANGE UP (ref 10.5–13.4)
RBC # BLD: 4.37 M/UL — SIGNIFICANT CHANGE UP (ref 4.2–5.8)
RBC # FLD: 11.6 % — SIGNIFICANT CHANGE UP (ref 10.3–14.5)
SARS-COV-2 RNA SPEC QL NAA+PROBE: SIGNIFICANT CHANGE UP
SODIUM SERPL-SCNC: 138 MMOL/L — SIGNIFICANT CHANGE UP (ref 135–145)
WBC # BLD: 7.69 K/UL — SIGNIFICANT CHANGE UP (ref 3.8–10.5)
WBC # FLD AUTO: 7.69 K/UL — SIGNIFICANT CHANGE UP (ref 3.8–10.5)

## 2022-04-02 PROCEDURE — 71045 X-RAY EXAM CHEST 1 VIEW: CPT | Mod: 26

## 2022-04-02 PROCEDURE — 99223 1ST HOSP IP/OBS HIGH 75: CPT

## 2022-04-02 PROCEDURE — 99285 EMERGENCY DEPT VISIT HI MDM: CPT

## 2022-04-02 PROCEDURE — 72125 CT NECK SPINE W/O DYE: CPT | Mod: 26,MA

## 2022-04-02 PROCEDURE — 70450 CT HEAD/BRAIN W/O DYE: CPT | Mod: 26,MA

## 2022-04-02 PROCEDURE — 93010 ELECTROCARDIOGRAM REPORT: CPT

## 2022-04-02 RX ORDER — SODIUM CHLORIDE 9 MG/ML
1000 INJECTION INTRAMUSCULAR; INTRAVENOUS; SUBCUTANEOUS ONCE
Refills: 0 | Status: DISCONTINUED | OUTPATIENT
Start: 2022-04-02 | End: 2022-04-02

## 2022-04-02 RX ORDER — LEVETIRACETAM 250 MG/1
1000 TABLET, FILM COATED ORAL ONCE
Refills: 0 | Status: DISCONTINUED | OUTPATIENT
Start: 2022-04-02 | End: 2022-04-02

## 2022-04-02 RX ORDER — LEVETIRACETAM 250 MG/1
3500 TABLET, FILM COATED ORAL ONCE
Refills: 0 | Status: DISCONTINUED | OUTPATIENT
Start: 2022-04-02 | End: 2022-04-02

## 2022-04-02 RX ORDER — LEVETIRACETAM 250 MG/1
4500 TABLET, FILM COATED ORAL ONCE
Refills: 0 | Status: DISCONTINUED | OUTPATIENT
Start: 2022-04-02 | End: 2022-04-02

## 2022-04-02 RX ORDER — CEFTRIAXONE 500 MG/1
1000 INJECTION, POWDER, FOR SOLUTION INTRAMUSCULAR; INTRAVENOUS ONCE
Refills: 0 | Status: COMPLETED | OUTPATIENT
Start: 2022-04-02 | End: 2022-04-02

## 2022-04-02 RX ORDER — SODIUM CHLORIDE 9 MG/ML
3100 INJECTION, SOLUTION INTRAVENOUS ONCE
Refills: 0 | Status: COMPLETED | OUTPATIENT
Start: 2022-04-02 | End: 2022-04-02

## 2022-04-02 RX ORDER — LEVETIRACETAM 250 MG/1
4500 TABLET, FILM COATED ORAL ONCE
Refills: 0 | Status: COMPLETED | OUTPATIENT
Start: 2022-04-02 | End: 2022-04-02

## 2022-04-02 RX ADMIN — SODIUM CHLORIDE 3100 MILLILITER(S): 9 INJECTION, SOLUTION INTRAVENOUS at 18:55

## 2022-04-02 RX ADMIN — LEVETIRACETAM 250 MILLIGRAM(S): 250 TABLET, FILM COATED ORAL at 17:47

## 2022-04-02 RX ADMIN — CEFTRIAXONE 100 MILLIGRAM(S): 500 INJECTION, POWDER, FOR SOLUTION INTRAMUSCULAR; INTRAVENOUS at 23:11

## 2022-04-02 RX ADMIN — Medication 4 MILLIGRAM(S): at 17:54

## 2022-04-02 NOTE — ED ADULT TRIAGE NOTE - CHIEF COMPLAINT QUOTE
brought by ems for s/p multiple seizures . pt is bedbound with history of CVA, HTN, DM, Seizures , dementia .

## 2022-04-02 NOTE — ED PROVIDER NOTE - CLINICAL SUMMARY MEDICAL DECISION MAKING FREE TEXT BOX
Pt in epileptic seizures, seizure now resolved ro intracranial process, ro sepsis, EKG, Ct head, C x-ray, sepsis workup and will give ativan and Keppra. Pt in epileptic seizures, seizure now resolved ro intracranial process, ro sepsis, EKG, Ct head, C x-ray, sepsis workup and will give ativan and Keppra.    Patient no longer having seizures but currently has prolonged post-ictal and sins tachycardia. Will give more fluids and reassess. Plan for admission to medicine telemetry. Labs, CT head and c spine, and CXR unremarkable Pt in epileptic seizures, seizure now resolved ro intracranial process, ro sepsis, EKG, Ct head, C x-ray, sepsis workup and will give ativan and Keppra.    Patient no longer having seizures but currently has prolonged post-ictal and sins tachycardia. Will give more fluids and reassess. Plan for admission to medicine telemetry. Labs, CT head and c spine, and CXR unremarkable    Will speak to neurology consult about this patient. Discussed with and will admit to Dr. Busby, hospitalist under telemetry for further evaluation.    Discussed with neurology who will see the patient in the morning.

## 2022-04-02 NOTE — ED PROVIDER NOTE - EKG ADDITIONAL INFORMATION FREE TEXT
sinus tachycardia rate of 120, left axis deviation, normal pr, qrs, qtc intervals, no annette or std

## 2022-04-02 NOTE — ED PROVIDER NOTE - PHYSICAL EXAMINATION
VITALS: reviewed  GEN: NAD, A & O x 4  HEAD/EYES: NCAT, PERRL, EOMI, anicteric sclerae, no conjunctival pallor  ENT: mucus membranes moist, oropharynx WNL, trachea midline, no JVD  RESP: lungs CTA with equal breath sounds bilaterally, chest wall nontender and atraumatic  CV: heart with reg rhythm S1, S2, no murmur; distal pulses intact and symmetric bilaterally  ABDOMEN: normoactive bowel sounds, soft, nondistended, nontender, no palpable masses  : no CVAT  MSK: extremities atraumatic and nontender, no edema, no asymmetry. the back is without midline or lateral tenderness, there is no spinal deformity or stepoff and the back is ranged painlessly. the neck has no midline tenderness, deformity, or stepoff, and is ranged painlessly.  SKIN: warm, dry, no rash, no bruising, no cyanosis. color appropriate for ethnicity  NEURO: alert, mentating appropriately, no facial asymmetry. gross sensation, motor, coordination are intact  PSYCH: Affect appropriate VITALS: reviewed  GEN: NAD, A & O x 4  HEAD/EYES: NCAT, PERRL, EOMI, anicteric sclerae, no conjunctival pallor  ENT: mucus membranes moist, oropharynx WNL, trachea midline, no JVD  RESP: lungs CTA with equal breath sounds bilaterally, chest wall nontender and atraumatic  CV: heart with reg rhythm S1, S2, no murmur; distal pulses intact and symmetric bilaterally  ABDOMEN: normoactive bowel sounds, soft, nondistended, nontender, no palpable masses  : no CVAT  MSK: extremities atraumatic and nontender, no edema, no asymmetry. the back is without midline or lateral tenderness, there is no spinal deformity or stepoff and the back is ranged painlessly. the neck has no midline tenderness, deformity, or stepoff, and is ranged painlessly.  Neuro: localizes to painful stimuli all extremities except left foot, clearing his airway, not following commands  SKIN: warm, dry, no rash, no bruising, no cyanosis. color appropriate for ethnicity  NEURO: alert, mentating appropriately, no facial asymmetry. gross sensation, motor, coordination are intact  PSYCH: Affect appropriate

## 2022-04-02 NOTE — ED PROVIDER NOTE - OBJECTIVE STATEMENT
83 yo M w/PMHx of stroke in the past , TIA in 2017, HTN presents to the ED for seizure. Pt noted his second stroke he was started on Keppra x500. Pt at baseline answers what his name, , can understand others and disoriented. Pt today had x15 minute seizure gave x10 of versa and had 2nd tonic, clonic seizure witnessed by EMS. Pt is still not back to his baseline mental status. 83 yo M w/PMHx of stroke in the past , TIA in 2017, HTN presents to the ED for seizure. Pt after his second stroke he was started on Keppra x500. Pt at baseline answers what his name, , can understand others and disoriented. Pt today had x15 minute seizure gave x10 of versa and had 2nd tonic, clonic seizure witnessed by EMS. Pt is still not back to his baseline mental status.

## 2022-04-02 NOTE — ED PROVIDER NOTE - NS ED ROS FT

## 2022-04-02 NOTE — ED PROVIDER NOTE - NS_ ATTENDINGSCRIBEDETAILS _ED_A_ED_FT
I agree with the scribe's documentation. I performed the history, physical exam, and medical decisionmaking.

## 2022-04-03 DIAGNOSIS — N17.9 ACUTE KIDNEY FAILURE, UNSPECIFIED: ICD-10-CM

## 2022-04-03 DIAGNOSIS — I10 ESSENTIAL (PRIMARY) HYPERTENSION: ICD-10-CM

## 2022-04-03 DIAGNOSIS — E87.6 HYPOKALEMIA: ICD-10-CM

## 2022-04-03 DIAGNOSIS — G40.409 OTHER GENERALIZED EPILEPSY AND EPILEPTIC SYNDROMES, NOT INTRACTABLE, WITHOUT STATUS EPILEPTICUS: ICD-10-CM

## 2022-04-03 DIAGNOSIS — G93.41 METABOLIC ENCEPHALOPATHY: ICD-10-CM

## 2022-04-03 PROBLEM — I63.9 CEREBRAL INFARCTION, UNSPECIFIED: Chronic | Status: ACTIVE | Noted: 2017-05-12

## 2022-04-03 PROBLEM — E78.5 HYPERLIPIDEMIA, UNSPECIFIED: Chronic | Status: ACTIVE | Noted: 2017-05-12

## 2022-04-03 PROBLEM — E11.9 TYPE 2 DIABETES MELLITUS WITHOUT COMPLICATIONS: Chronic | Status: ACTIVE | Noted: 2017-05-12

## 2022-04-03 PROBLEM — G40.909 EPILEPSY, UNSPECIFIED, NOT INTRACTABLE, WITHOUT STATUS EPILEPTICUS: Chronic | Status: ACTIVE | Noted: 2017-05-12

## 2022-04-03 LAB
-  COAGULASE NEGATIVE STAPHYLOCOCCUS: SIGNIFICANT CHANGE UP
APPEARANCE UR: CLEAR — SIGNIFICANT CHANGE UP
BACTERIA # UR AUTO: ABNORMAL
BILIRUB UR-MCNC: NEGATIVE — SIGNIFICANT CHANGE UP
COLOR SPEC: YELLOW — SIGNIFICANT CHANGE UP
DIFF PNL FLD: ABNORMAL
EPI CELLS # UR: SIGNIFICANT CHANGE UP
GLUCOSE BLDC GLUCOMTR-MCNC: 112 MG/DL — HIGH (ref 70–99)
GLUCOSE BLDC GLUCOMTR-MCNC: 114 MG/DL — HIGH (ref 70–99)
GLUCOSE BLDC GLUCOMTR-MCNC: 121 MG/DL — HIGH (ref 70–99)
GLUCOSE BLDC GLUCOMTR-MCNC: 95 MG/DL — SIGNIFICANT CHANGE UP (ref 70–99)
GLUCOSE UR QL: NEGATIVE MG/DL — SIGNIFICANT CHANGE UP
KETONES UR-MCNC: NEGATIVE — SIGNIFICANT CHANGE UP
LEUKOCYTE ESTERASE UR-ACNC: NEGATIVE — SIGNIFICANT CHANGE UP
METHOD TYPE: SIGNIFICANT CHANGE UP
NITRITE UR-MCNC: NEGATIVE — SIGNIFICANT CHANGE UP
PH UR: 5 — SIGNIFICANT CHANGE UP (ref 5–8)
PROT UR-MCNC: 15 MG/DL
RBC CASTS # UR COMP ASSIST: SIGNIFICANT CHANGE UP /HPF (ref 0–4)
SP GR SPEC: 1.02 — SIGNIFICANT CHANGE UP (ref 1.01–1.02)
UROBILINOGEN FLD QL: NEGATIVE MG/DL — SIGNIFICANT CHANGE UP
WBC UR QL: SIGNIFICANT CHANGE UP

## 2022-04-03 PROCEDURE — 99233 SBSQ HOSP IP/OBS HIGH 50: CPT

## 2022-04-03 PROCEDURE — 99254 IP/OBS CNSLTJ NEW/EST MOD 60: CPT

## 2022-04-03 RX ORDER — LACOSAMIDE 50 MG/1
200 TABLET ORAL
Refills: 0 | Status: DISCONTINUED | OUTPATIENT
Start: 2022-04-03 | End: 2022-04-05

## 2022-04-03 RX ORDER — GLUCAGON INJECTION, SOLUTION 0.5 MG/.1ML
1 INJECTION, SOLUTION SUBCUTANEOUS ONCE
Refills: 0 | Status: DISCONTINUED | OUTPATIENT
Start: 2022-04-03 | End: 2022-04-05

## 2022-04-03 RX ORDER — CEFTRIAXONE 500 MG/1
1000 INJECTION, POWDER, FOR SOLUTION INTRAMUSCULAR; INTRAVENOUS ONCE
Refills: 0 | Status: COMPLETED | OUTPATIENT
Start: 2022-04-03 | End: 2022-04-03

## 2022-04-03 RX ORDER — ONDANSETRON 8 MG/1
4 TABLET, FILM COATED ORAL EVERY 8 HOURS
Refills: 0 | Status: DISCONTINUED | OUTPATIENT
Start: 2022-04-03 | End: 2022-04-05

## 2022-04-03 RX ORDER — LEVETIRACETAM 250 MG/1
750 TABLET, FILM COATED ORAL EVERY 12 HOURS
Refills: 0 | Status: DISCONTINUED | OUTPATIENT
Start: 2022-04-03 | End: 2022-04-03

## 2022-04-03 RX ORDER — SODIUM CHLORIDE 9 MG/ML
1000 INJECTION, SOLUTION INTRAVENOUS
Refills: 0 | Status: DISCONTINUED | OUTPATIENT
Start: 2022-04-03 | End: 2022-04-05

## 2022-04-03 RX ORDER — POLYETHYLENE GLYCOL 3350 17 G/17G
17 POWDER, FOR SOLUTION ORAL DAILY
Refills: 0 | Status: DISCONTINUED | OUTPATIENT
Start: 2022-04-03 | End: 2022-04-05

## 2022-04-03 RX ORDER — ASPIRIN/CALCIUM CARB/MAGNESIUM 324 MG
81 TABLET ORAL DAILY
Refills: 0 | Status: DISCONTINUED | OUTPATIENT
Start: 2022-04-03 | End: 2022-04-05

## 2022-04-03 RX ORDER — SOD SULF/SODIUM/NAHCO3/KCL/PEG
4000 SOLUTION, RECONSTITUTED, ORAL ORAL ONCE
Refills: 0 | Status: DISCONTINUED | OUTPATIENT
Start: 2022-04-03 | End: 2022-04-03

## 2022-04-03 RX ORDER — METOPROLOL TARTRATE 50 MG
25 TABLET ORAL DAILY
Refills: 0 | Status: DISCONTINUED | OUTPATIENT
Start: 2022-04-03 | End: 2022-04-05

## 2022-04-03 RX ORDER — PANTOPRAZOLE SODIUM 20 MG/1
40 TABLET, DELAYED RELEASE ORAL
Refills: 0 | Status: DISCONTINUED | OUTPATIENT
Start: 2022-04-03 | End: 2022-04-05

## 2022-04-03 RX ORDER — ACETAMINOPHEN 500 MG
650 TABLET ORAL EVERY 6 HOURS
Refills: 0 | Status: DISCONTINUED | OUTPATIENT
Start: 2022-04-03 | End: 2022-04-05

## 2022-04-03 RX ORDER — POTASSIUM CHLORIDE 20 MEQ
10 PACKET (EA) ORAL
Refills: 0 | Status: COMPLETED | OUTPATIENT
Start: 2022-04-03 | End: 2022-04-03

## 2022-04-03 RX ORDER — INSULIN HUMAN 100 [IU]/ML
20 INJECTION, SOLUTION SUBCUTANEOUS
Refills: 0 | Status: DISCONTINUED | OUTPATIENT
Start: 2022-04-03 | End: 2022-04-04

## 2022-04-03 RX ORDER — LEVETIRACETAM 250 MG/1
1000 TABLET, FILM COATED ORAL
Refills: 0 | Status: DISCONTINUED | OUTPATIENT
Start: 2022-04-03 | End: 2022-04-05

## 2022-04-03 RX ORDER — SODIUM CHLORIDE 9 MG/ML
1000 INJECTION INTRAMUSCULAR; INTRAVENOUS; SUBCUTANEOUS
Refills: 0 | Status: DISCONTINUED | OUTPATIENT
Start: 2022-04-03 | End: 2022-04-04

## 2022-04-03 RX ORDER — IPRATROPIUM/ALBUTEROL SULFATE 18-103MCG
3 AEROSOL WITH ADAPTER (GRAM) INHALATION ONCE
Refills: 0 | Status: DISCONTINUED | OUTPATIENT
Start: 2022-04-03 | End: 2022-04-03

## 2022-04-03 RX ORDER — INSULIN LISPRO 100/ML
VIAL (ML) SUBCUTANEOUS
Refills: 0 | Status: DISCONTINUED | OUTPATIENT
Start: 2022-04-03 | End: 2022-04-05

## 2022-04-03 RX ORDER — DEXTROSE 50 % IN WATER 50 %
15 SYRINGE (ML) INTRAVENOUS ONCE
Refills: 0 | Status: DISCONTINUED | OUTPATIENT
Start: 2022-04-03 | End: 2022-04-05

## 2022-04-03 RX ORDER — ENOXAPARIN SODIUM 100 MG/ML
40 INJECTION SUBCUTANEOUS EVERY 24 HOURS
Refills: 0 | Status: DISCONTINUED | OUTPATIENT
Start: 2022-04-03 | End: 2022-04-05

## 2022-04-03 RX ORDER — LISINOPRIL 2.5 MG/1
20 TABLET ORAL DAILY
Refills: 0 | Status: DISCONTINUED | OUTPATIENT
Start: 2022-04-03 | End: 2022-04-05

## 2022-04-03 RX ORDER — AMLODIPINE BESYLATE 2.5 MG/1
10 TABLET ORAL DAILY
Refills: 0 | Status: DISCONTINUED | OUTPATIENT
Start: 2022-04-03 | End: 2022-04-05

## 2022-04-03 RX ORDER — DEXTROSE 50 % IN WATER 50 %
25 SYRINGE (ML) INTRAVENOUS ONCE
Refills: 0 | Status: DISCONTINUED | OUTPATIENT
Start: 2022-04-03 | End: 2022-04-05

## 2022-04-03 RX ORDER — LANOLIN ALCOHOL/MO/W.PET/CERES
3 CREAM (GRAM) TOPICAL AT BEDTIME
Refills: 0 | Status: DISCONTINUED | OUTPATIENT
Start: 2022-04-03 | End: 2022-04-05

## 2022-04-03 RX ORDER — ATORVASTATIN CALCIUM 80 MG/1
80 TABLET, FILM COATED ORAL AT BEDTIME
Refills: 0 | Status: DISCONTINUED | OUTPATIENT
Start: 2022-04-03 | End: 2022-04-05

## 2022-04-03 RX ORDER — ALBUTEROL 90 UG/1
2 AEROSOL, METERED ORAL ONCE
Refills: 0 | Status: DISCONTINUED | OUTPATIENT
Start: 2022-04-03 | End: 2022-04-05

## 2022-04-03 RX ADMIN — AMLODIPINE BESYLATE 10 MILLIGRAM(S): 2.5 TABLET ORAL at 06:13

## 2022-04-03 RX ADMIN — Medication 100 MILLIEQUIVALENT(S): at 10:13

## 2022-04-03 RX ADMIN — PANTOPRAZOLE SODIUM 40 MILLIGRAM(S): 20 TABLET, DELAYED RELEASE ORAL at 06:14

## 2022-04-03 RX ADMIN — LEVETIRACETAM 400 MILLIGRAM(S): 250 TABLET, FILM COATED ORAL at 18:26

## 2022-04-03 RX ADMIN — CEFTRIAXONE 100 MILLIGRAM(S): 500 INJECTION, POWDER, FOR SOLUTION INTRAMUSCULAR; INTRAVENOUS at 22:48

## 2022-04-03 RX ADMIN — LACOSAMIDE 200 MILLIGRAM(S): 50 TABLET ORAL at 06:14

## 2022-04-03 RX ADMIN — LEVETIRACETAM 400 MILLIGRAM(S): 250 TABLET, FILM COATED ORAL at 06:30

## 2022-04-03 RX ADMIN — Medication 100 MILLIEQUIVALENT(S): at 12:58

## 2022-04-03 RX ADMIN — Medication 100 MILLIEQUIVALENT(S): at 07:04

## 2022-04-03 RX ADMIN — ENOXAPARIN SODIUM 40 MILLIGRAM(S): 100 INJECTION SUBCUTANEOUS at 06:14

## 2022-04-03 RX ADMIN — ATORVASTATIN CALCIUM 80 MILLIGRAM(S): 80 TABLET, FILM COATED ORAL at 22:48

## 2022-04-03 RX ADMIN — LISINOPRIL 20 MILLIGRAM(S): 2.5 TABLET ORAL at 06:13

## 2022-04-03 RX ADMIN — Medication 25 MILLIGRAM(S): at 06:13

## 2022-04-03 RX ADMIN — LACOSAMIDE 200 MILLIGRAM(S): 50 TABLET ORAL at 17:36

## 2022-04-03 RX ADMIN — Medication 81 MILLIGRAM(S): at 13:00

## 2022-04-03 RX ADMIN — SODIUM CHLORIDE 75 MILLILITER(S): 9 INJECTION INTRAMUSCULAR; INTRAVENOUS; SUBCUTANEOUS at 12:58

## 2022-04-03 RX ADMIN — SODIUM CHLORIDE 125 MILLILITER(S): 9 INJECTION INTRAMUSCULAR; INTRAVENOUS; SUBCUTANEOUS at 06:13

## 2022-04-03 NOTE — PHYSICAL THERAPY INITIAL EVALUATION ADULT - PREDICTED DURATION OF THERAPY (DAYS/WKS), PT EVAL
Pt is at his baseline performance in functional mobility. No skilled PT intervention indicated at present. Pt is discharged from P.T.

## 2022-04-03 NOTE — PROGRESS NOTE ADULT - SUBJECTIVE AND OBJECTIVE BOX
Resting in bed NAD. Afebrile Hemodynamically stable. No acute events. no further SZ. more alert. awake and aao x 1       Physical Exam: Constitutional: NAD arousable only to painful stimulus   HEENT PERRLA EOMI  CV RRR S1S2  Pulm CTA b/l   GI soft nontender nondistended + BS   Neuro CN II-XII grossly intact   Extremities no edema or calf tenderness. muscle strenght in LUE and LLE 0/5. RUE 5/5, RLE 3/5

## 2022-04-03 NOTE — H&P ADULT - HISTORY OF PRESENT ILLNESS
This is an 81 yo M with Hx of CVA in 2012, TIA in 2017, HTN,  presents to the ED for seizure. Pt after his second stroke he was started on Keppra x750 bid. This mornign he had a 15 min tonic clonic sz x 15 min terminated with 10 of valium by ems. he then had 2 more episodes of sz. given 4 mg ativan in ED adn loaded with IV keppra.  received 4 L LR. At baseline patient is aao x 2 answers his name, , can understand others. He is still post ictal and arousable only to sternal rub. hemodynamically stable. creat 1.37 k 3.4, Neuro contacted by ED. he also received a dose of rocephin in ed.   This is an 83 yo M with Hx of CVA in 2012, TIA in 2017, HTN,  presents to the ED for seizure. Pt after his second stroke he was started on Keppra x750 bid. This mornign he had a 15 min tonic clonic sz x 15 min terminated with 10 of valium by ems. he then had 2 more episodes of sz. given 4 mg ativan in ED adn loaded with IV keppra.  received 4 L LR. At baseline patient is aao x 2 answers his name, , can understand others. He is still post ictal and arousable only to sternal rub. hemodynamically stable. creat 1.37 k 3.4, Neuro contacted by ED. he also received a dose of rocephin in ed.  Ct head shows old infarcts. ct c spine shows c3 stenosis

## 2022-04-03 NOTE — CHART NOTE - NSCHARTNOTEFT_GEN_A_CORE
Notified by RN of critical value: +blood culture- Gram positive cocci in culture.   CXR revealing patchy left basilar infiltrate.  No WBC as of 4/2/21 and no sign of fever  Ceftriaxone 1g IV once  F/u morning labs Notified by RN of critical value: +blood culture- Gram positive cocci in culture.   CXR revealing patchy left basilar infiltrate.  No WBC as of 4/2/22 and no sign of fever  Ceftriaxone 1g IV once  F/u morning labs

## 2022-04-03 NOTE — PHYSICAL THERAPY INITIAL EVALUATION ADULT - PRECAUTIONS/LIMITATIONS, REHAB EVAL
cardiac precautions/fall precautions/obesity precautions/oxygen therapy device and L/min/seizure precautions

## 2022-04-03 NOTE — PROGRESS NOTE ADULT - ASSESSMENT
This is an 81 yo M with Hx of CVA in 2012, TIA in 2017, HTN,  presents to the ED for seizure. Pt after his second stroke he was started on Keppra x750 bid . This mornign he had a 15 min tonic clonic sz x 15 min terminated with 10 of valium by ems. he then had 2 more episodes of sz. given 4 mg ativan in ED and loaded with IV keppra.  received 4 L LR. At baseline patient is aao x 2 answers his name, , can understand others. He is still post ictal and arousable only to sternal rub. hemodynamically stable. creat 1.37 k 3.4, Neuro contacted by ED. he also received a dose of rocephin in ed.      Tonic clonic SZ new onset x 3 episodes 15 min, 10 min and 5 min   hx cva   HTN   acute neurologic encephalopathy  hypokalemia   anuel vs ckd   -clinically more alert aao x 1. has R sided neuro deficits unclear if this is from old cva or new. is already on statin and asa   -f/u neuro recs  -keppra IV 1000 BID  -cw home lacosamide 200 bid   -unknown creat baseline, keep on NS @ 100  -neuro checks. post ictal due to sz and ativan sedation   -rejected by icu   -replete k prn   -f/u keppra levels  -resume home meds  -diabetic diet

## 2022-04-03 NOTE — H&P ADULT - ASSESSMENT
This is an 83 yo M with Hx of CVA in 2012, TIA in 2017, HTN,  presents to the ED for seizure. Pt after his second stroke he was started on Keppra x750 bid . This mornign he had a 15 min tonic clonic sz x 15 min terminated with 10 of valium by ems. he then had 2 more episodes of sz. given 4 mg ativan in ED and loaded with IV keppra.  received 4 L LR. At baseline patient is aao x 2 answers his name, , can understand others. He is still post ictal and arousable only to sternal rub. hemodynamically stable. creat 1.37 k 3.4, Neuro contacted by ED. he also received a dose of rocephin in ed.      Tonic clonic SZ new onset x 3 episodes 15 min, 10 min and 5 min   hx cva   HTN   acute neurologic encephalopathy  hypkalemia   -f/u neuro recs  -keppra IV 1000 BID  -cw home lacosamide 200 bid   -neuro checks. post ictal due to sz and ativan sedation   -rejected by icu   -replete k prn   -f/u keppra levels  -resume home meds  -diabetic diet    This is an 83 yo M with Hx of CVA in , TIA in 2017, HTN,  presents to the ED for seizure. Pt after his second stroke he was started on Keppra x750 bid . This mornign he had a 15 min tonic clonic sz x 15 min terminated with 10 of valium by ems. he then had 2 more episodes of sz. given 4 mg ativan in ED and loaded with IV keppra.  received 4 L LR. At baseline patient is aao x 2 answers his name, , can understand others. He is still post ictal and arousable only to sternal rub. hemodynamically stable. creat 1.37 k 3.4, Neuro contacted by ED. he also received a dose of rocephin in ed.      Tonic clonic SZ new onset x 3 episodes 15 min, 10 min and 5 min   hx cva   HTN   acute neurologic encephalopathy  hypokalemia   anuel vs ckd   -f/u neuro recs  -keppra IV 1000 BID  -cw home lacosamide 200 bid   -unknown creat baseline, keep on NS @ 100  -neuro checks. post ictal due to sz and ativan sedation   -rejected by icu   -replete k prn   -f/u keppra levels  -resume home meds  -diabetic diet

## 2022-04-03 NOTE — PROVIDER CONTACT NOTE (CHANGE IN STATUS NOTIFICATION) - SITUATION
Endorsed by previous shift 7 A nurse about blood culture collected 4/2 growth in aerobic gram positive cocci in cluster

## 2022-04-03 NOTE — PHYSICAL THERAPY INITIAL EVALUATION ADULT - GENERAL OBSERVATIONS, REHAB EVAL
Pt was seen in supine c cardiac monitor, IV, O2 at 2.5 l/min  through nasal cannula and Primafit donned, lethargic but able to be aroused by verbal and kinetic stimulation. Pt was oriented to name and able to follow 20% of simple command. However, pt 's speech was not clear.  General weakness was noted c L hemiparesis in L extremities.

## 2022-04-03 NOTE — CONSULT NOTE ADULT - SUBJECTIVE AND OBJECTIVE BOX
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  Wife reports he takes Vimpat 200mg BID and Keppra 750mg BID compliantly, no missed doses.  No clear provoking factors.  He is now back to baseline, no complaints.    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  Compliant with current AEDs at home.  No provoking factors.    Recommendations:  1. Increase Keppra to 1000mg PO BID  2. Continue Vimpat 200mg PO BID  3. Seizure precautions  4. Follow up with Dr Mcfarlane, [address] [facility], [place], or [address], [place], [phone] for appointment    Eleuterio Mcfarlane MD  [facility] Department of Neurology  Epilepsy Center

## 2022-04-03 NOTE — H&P ADULT - NSHPPHYSICALEXAM_GEN_ALL_CORE
Constitutional: NAD arousable only to painful stimulus   HEENT PERRLA EOMI  CV RRR S1S2  Pulm CTA b/l   GI soft nontender nondistended + BS   Neuro CN II-XII grossly intact   Extremities no edema or calf tenderness

## 2022-04-03 NOTE — PHYSICAL THERAPY INITIAL EVALUATION ADULT - ADDITIONAL COMMENTS
Spoke to daughter, Lisandra at [phone]. Pt has been bed bound and needed  lift (Agustin lift) for OOB prior to admission. Pt has home health aide Mon to Wed for 10 hour and Thu to Sun for 11 hours to assist in ADL. prior to admission.

## 2022-04-03 NOTE — PHYSICAL THERAPY INITIAL EVALUATION ADULT - PERTINENT HX OF CURRENT PROBLEM, REHAB EVAL
This is an 81 yo M with Hx of CVA in 2012, TIA in 2017, HTN,  presents to the ED for seizure. Ct head shows old infarcts. ct c spine shows c3 stenosis

## 2022-04-04 LAB
A1C WITH ESTIMATED AVERAGE GLUCOSE RESULT: 5.9 % — HIGH (ref 4–5.6)
ALBUMIN SERPL ELPH-MCNC: 3.2 G/DL — LOW (ref 3.3–5)
ALP SERPL-CCNC: 56 U/L — SIGNIFICANT CHANGE UP (ref 40–120)
ALT FLD-CCNC: 22 U/L — SIGNIFICANT CHANGE UP (ref 12–78)
ANION GAP SERPL CALC-SCNC: 3 MMOL/L — LOW (ref 5–17)
AST SERPL-CCNC: 24 U/L — SIGNIFICANT CHANGE UP (ref 15–37)
BASOPHILS # BLD AUTO: 0.04 K/UL — SIGNIFICANT CHANGE UP (ref 0–0.2)
BASOPHILS NFR BLD AUTO: 0.6 % — SIGNIFICANT CHANGE UP (ref 0–2)
BILIRUB SERPL-MCNC: 0.5 MG/DL — SIGNIFICANT CHANGE UP (ref 0.2–1.2)
BUN SERPL-MCNC: 9 MG/DL — SIGNIFICANT CHANGE UP (ref 7–23)
CALCIUM SERPL-MCNC: 9 MG/DL — SIGNIFICANT CHANGE UP (ref 8.5–10.1)
CHLORIDE SERPL-SCNC: 108 MMOL/L — SIGNIFICANT CHANGE UP (ref 96–108)
CO2 SERPL-SCNC: 29 MMOL/L — SIGNIFICANT CHANGE UP (ref 22–31)
CREAT SERPL-MCNC: 0.98 MG/DL — SIGNIFICANT CHANGE UP (ref 0.5–1.3)
CULTURE RESULTS: SIGNIFICANT CHANGE UP
CULTURE RESULTS: SIGNIFICANT CHANGE UP
EGFR: 77 ML/MIN/1.73M2 — SIGNIFICANT CHANGE UP
EOSINOPHIL # BLD AUTO: 0.36 K/UL — SIGNIFICANT CHANGE UP (ref 0–0.5)
EOSINOPHIL NFR BLD AUTO: 5.3 % — SIGNIFICANT CHANGE UP (ref 0–6)
ESTIMATED AVERAGE GLUCOSE: 123 MG/DL — HIGH (ref 68–114)
GLUCOSE BLDC GLUCOMTR-MCNC: 110 MG/DL — HIGH (ref 70–99)
GLUCOSE BLDC GLUCOMTR-MCNC: 112 MG/DL — HIGH (ref 70–99)
GLUCOSE SERPL-MCNC: 109 MG/DL — HIGH (ref 70–99)
GRAM STN FLD: SIGNIFICANT CHANGE UP
HCT VFR BLD CALC: 37.2 % — LOW (ref 39–50)
HGB BLD-MCNC: 12.2 G/DL — LOW (ref 13–17)
IMM GRANULOCYTES NFR BLD AUTO: 0.1 % — SIGNIFICANT CHANGE UP (ref 0–1.5)
LYMPHOCYTES # BLD AUTO: 2.41 K/UL — SIGNIFICANT CHANGE UP (ref 1–3.3)
LYMPHOCYTES # BLD AUTO: 35.7 % — SIGNIFICANT CHANGE UP (ref 13–44)
MCHC RBC-ENTMCNC: 30.5 PG — SIGNIFICANT CHANGE UP (ref 27–34)
MCHC RBC-ENTMCNC: 32.8 G/DL — SIGNIFICANT CHANGE UP (ref 32–36)
MCV RBC AUTO: 93 FL — SIGNIFICANT CHANGE UP (ref 80–100)
MONOCYTES # BLD AUTO: 0.48 K/UL — SIGNIFICANT CHANGE UP (ref 0–0.9)
MONOCYTES NFR BLD AUTO: 7.1 % — SIGNIFICANT CHANGE UP (ref 2–14)
NEUTROPHILS # BLD AUTO: 3.45 K/UL — SIGNIFICANT CHANGE UP (ref 1.8–7.4)
NEUTROPHILS NFR BLD AUTO: 51.2 % — SIGNIFICANT CHANGE UP (ref 43–77)
NRBC # BLD: 0 /100 WBCS — SIGNIFICANT CHANGE UP (ref 0–0)
ORGANISM # SPEC MICROSCOPIC CNT: SIGNIFICANT CHANGE UP
ORGANISM # SPEC MICROSCOPIC CNT: SIGNIFICANT CHANGE UP
PLATELET # BLD AUTO: 147 K/UL — LOW (ref 150–400)
POTASSIUM SERPL-MCNC: 4.5 MMOL/L — SIGNIFICANT CHANGE UP (ref 3.5–5.3)
POTASSIUM SERPL-SCNC: 4.5 MMOL/L — SIGNIFICANT CHANGE UP (ref 3.5–5.3)
PROT SERPL-MCNC: 7.5 GM/DL — SIGNIFICANT CHANGE UP (ref 6–8.3)
RBC # BLD: 4 M/UL — LOW (ref 4.2–5.8)
RBC # FLD: 11.6 % — SIGNIFICANT CHANGE UP (ref 10.3–14.5)
SODIUM SERPL-SCNC: 140 MMOL/L — SIGNIFICANT CHANGE UP (ref 135–145)
SPECIMEN SOURCE: SIGNIFICANT CHANGE UP
SPECIMEN SOURCE: SIGNIFICANT CHANGE UP
WBC # BLD: 6.75 K/UL — SIGNIFICANT CHANGE UP (ref 3.8–10.5)
WBC # FLD AUTO: 6.75 K/UL — SIGNIFICANT CHANGE UP (ref 3.8–10.5)

## 2022-04-04 PROCEDURE — 99232 SBSQ HOSP IP/OBS MODERATE 35: CPT

## 2022-04-04 RX ADMIN — AMLODIPINE BESYLATE 10 MILLIGRAM(S): 2.5 TABLET ORAL at 06:35

## 2022-04-04 RX ADMIN — LISINOPRIL 20 MILLIGRAM(S): 2.5 TABLET ORAL at 16:57

## 2022-04-04 RX ADMIN — Medication 81 MILLIGRAM(S): at 16:57

## 2022-04-04 RX ADMIN — ATORVASTATIN CALCIUM 80 MILLIGRAM(S): 80 TABLET, FILM COATED ORAL at 21:44

## 2022-04-04 RX ADMIN — PANTOPRAZOLE SODIUM 40 MILLIGRAM(S): 20 TABLET, DELAYED RELEASE ORAL at 06:36

## 2022-04-04 RX ADMIN — LEVETIRACETAM 1000 MILLIGRAM(S): 250 TABLET, FILM COATED ORAL at 17:38

## 2022-04-04 RX ADMIN — LACOSAMIDE 200 MILLIGRAM(S): 50 TABLET ORAL at 06:31

## 2022-04-04 RX ADMIN — LEVETIRACETAM 1000 MILLIGRAM(S): 250 TABLET, FILM COATED ORAL at 06:34

## 2022-04-04 RX ADMIN — ENOXAPARIN SODIUM 40 MILLIGRAM(S): 100 INJECTION SUBCUTANEOUS at 06:35

## 2022-04-04 RX ADMIN — LACOSAMIDE 200 MILLIGRAM(S): 50 TABLET ORAL at 17:37

## 2022-04-04 RX ADMIN — Medication 650 MILLIGRAM(S): at 20:51

## 2022-04-04 RX ADMIN — Medication 650 MILLIGRAM(S): at 19:13

## 2022-04-04 NOTE — OCCUPATIONAL THERAPY INITIAL EVALUATION ADULT - PERTINENT HX OF CURRENT PROBLEM, REHAB EVAL
Pt is 81 yo M admitted to the ED on 4/2 c/o seizures. CT Head revealed old infarcts. CT cervical spine shows c3 stenosis, per RN Shonna pt OK on room air, O2 ranging 95-99%

## 2022-04-04 NOTE — OCCUPATIONAL THERAPY INITIAL EVALUATION ADULT - ANTICIPATED DISCHARGE DISPOSITION, OT EVAL
home c prior level of care. Pt is at baseline performance in functional mobility/ADL management. No skilled OT intervention indicated at present. Pt is discharged from OT.

## 2022-04-04 NOTE — PROGRESS NOTE ADULT - ASSESSMENT
This is an 83 yo M with Hx of CVA in 2012, TIA in 2017, HTN,  presents to the ED for seizure. Pt after his second stroke he was started on Keppra x750 bid . This morning he had a 15 min tonic clonic sz x 15 min terminated with 10 of valium by ems. he then had 2 more episodes of sz. given 4 mg ativan in ED and loaded with IV keppra.  received 4 L LR. At baseline patient is aao x 2 answers his name, , can understand others. He is still post ictal and arousable only to sternal rub. hemodynamically stable. creat 1.37 k 3.4, Neuro contacted by ED. he also received a dose of Rocephin in ed.      Breakthrough seizure:  - Tonic clonic SZ new onset x 3 episodes 15 min, 10 min and 5 min   - CT head: Ischemic events including an old right cerebellar infarct, old high left frontal infarct and old right thalamic lacunar infarct.  - Keppra IV 1000 BID, cw home lacosamide 200 bid, keppra level wasn't collected on admission  - Neuro follow up     Acute metabolic encephalopathy:  - 2/2 seizure  - Continue to monitor    HTN:  - BP acceptable  - Continue current meds    H/O CVA:  - Continue ASA/ statin    Hypokalemia:  - Being replaced      GUY:  - Likely pre renal  - Resolved after IVF, can stop IVF now    DM:  - Hba 1c 5.9  - Likely will not need med on DC    Positive blood clx:  - Coag neg gram pos cocci, follow up final result, possibly contaminated     DC home when stable

## 2022-04-04 NOTE — OCCUPATIONAL THERAPY INITIAL EVALUATION ADULT - IMPAIRMENTS CONTRIBUTING IMPAIRED BED MOBILITY, REHAB EVAL
cognition/impaired coordination/impaired motor control/impaired postural control/decreased ROM/decreased strength

## 2022-04-04 NOTE — OCCUPATIONAL THERAPY INITIAL EVALUATION ADULT - ADDITIONAL COMMENTS
Per PT Evaluation: Spoke to daughterLisandra at [phone]. Pt has been bed bound and needed  lift (Agustin lift) for OOB prior to admission. Pt has home health aide Mon to Wed for 10 hour and Thu to Sun for 11 hours to assist in ADL. prior to admission.

## 2022-04-05 VITALS
DIASTOLIC BLOOD PRESSURE: 70 MMHG | SYSTOLIC BLOOD PRESSURE: 130 MMHG | RESPIRATION RATE: 18 BRPM | OXYGEN SATURATION: 97 % | TEMPERATURE: 97 F | HEART RATE: 57 BPM

## 2022-04-05 LAB
CULTURE RESULTS: SIGNIFICANT CHANGE UP
GLUCOSE BLDC GLUCOMTR-MCNC: 140 MG/DL — HIGH (ref 70–99)
GLUCOSE BLDC GLUCOMTR-MCNC: 99 MG/DL — SIGNIFICANT CHANGE UP (ref 70–99)
GRAM STN FLD: SIGNIFICANT CHANGE UP
SPECIMEN SOURCE: SIGNIFICANT CHANGE UP

## 2022-04-05 PROCEDURE — 99239 HOSP IP/OBS DSCHRG MGMT >30: CPT

## 2022-04-05 PROCEDURE — 95816 EEG AWAKE AND DROWSY: CPT | Mod: 26

## 2022-04-05 RX ORDER — ASPIRIN/CALCIUM CARB/MAGNESIUM 324 MG
1 TABLET ORAL
Qty: 30 | Refills: 0
Start: 2022-04-05 | End: 2022-05-04

## 2022-04-05 RX ORDER — HUMAN INSULIN 100 [IU]/ML
20 INJECTION, SUSPENSION SUBCUTANEOUS
Qty: 0 | Refills: 0 | DISCHARGE

## 2022-04-05 RX ORDER — LEVETIRACETAM 250 MG/1
1 TABLET, FILM COATED ORAL
Qty: 60 | Refills: 0
Start: 2022-04-05 | End: 2022-05-04

## 2022-04-05 RX ORDER — LACOSAMIDE 50 MG/1
1 TABLET ORAL
Qty: 60 | Refills: 0
Start: 2022-04-05 | End: 2022-05-04

## 2022-04-05 RX ORDER — HUMAN INSULIN 100 [IU]/ML
22 INJECTION, SUSPENSION SUBCUTANEOUS
Qty: 0 | Refills: 0 | DISCHARGE

## 2022-04-05 RX ADMIN — AMLODIPINE BESYLATE 10 MILLIGRAM(S): 2.5 TABLET ORAL at 05:21

## 2022-04-05 RX ADMIN — LISINOPRIL 20 MILLIGRAM(S): 2.5 TABLET ORAL at 05:21

## 2022-04-05 RX ADMIN — LACOSAMIDE 200 MILLIGRAM(S): 50 TABLET ORAL at 05:22

## 2022-04-05 RX ADMIN — PANTOPRAZOLE SODIUM 40 MILLIGRAM(S): 20 TABLET, DELAYED RELEASE ORAL at 08:46

## 2022-04-05 RX ADMIN — LEVETIRACETAM 1000 MILLIGRAM(S): 250 TABLET, FILM COATED ORAL at 05:21

## 2022-04-05 RX ADMIN — Medication 81 MILLIGRAM(S): at 11:35

## 2022-04-05 RX ADMIN — Medication 25 MILLIGRAM(S): at 05:21

## 2022-04-05 RX ADMIN — ENOXAPARIN SODIUM 40 MILLIGRAM(S): 100 INJECTION SUBCUTANEOUS at 05:22

## 2022-04-05 NOTE — DISCHARGE NOTE PROVIDER - NSDCMRMEDTOKEN_GEN_ALL_CORE_FT
amLODIPine 10 mg oral tablet: 1 tab(s) orally once a day  Aspirin Enteric Coated 81 mg oral delayed release tablet: 1 tab(s) orally once a day   atorvastatin 80 mg oral tablet: 1 tab(s) orally once a day (at bedtime)  bisacodyl 10 mg rectal suppository: 1 suppository(ies) rectal once a day, As needed, Constipation  docusate sodium 100 mg oral capsule: 1 cap(s) orally 2 times a day  Fish Oil oral capsule: 1 tab(s) orally once a day  lacosamide 200 mg oral tablet: 1 tab(s) orally 2 times a day MDD:2  levETIRAcetam 1000 mg oral tablet: 1 tab(s) orally 2 times a day  lisinopril 20 mg oral tablet: 1 tab(s) orally once a day  metoprolol tartrate 25 mg oral tablet: 1 tab(s) orally 2 times a day  multivitamin: 1 tab(s) orally once a day  omeprazole 40 mg oral delayed release capsule: 1 cap(s) orally once a day  polyethylene glycol 3350 oral powder for reconstitution: 17 gram(s) orally once a day  Vitamin C:  orally

## 2022-04-05 NOTE — EEG REPORT - NS EEG TEXT BOX
Routine 21-channel digital EEG was obtained to rule out any seizure activity or focal abnormalities.    FINDINGS: Background rhythm consisted of mainly theta/polymorphic delta activity, the posterior dominant rhythm was seen at 7Hz. No spike-and-wave discharges or any lateralizing abnormalities are seen. Photic stimulation did not produce any abnormalities. No abnormalities were found during the procedure. Intermittent EMG artifacts were seen. Stage II sleep not achieved.    IMPRESSION: Mild diffuse cortical dysfunction. No epileptiform discharges or any other paroxysmal activities or focal abnormalities seen. Clinical correlation is recommended.    Marian Mendez DO

## 2022-04-05 NOTE — DISCHARGE NOTE PROVIDER - NSDCCPCAREPLAN_GEN_ALL_CORE_FT
PRINCIPAL DISCHARGE DIAGNOSIS  Diagnosis: Status epilepticus  Assessment and Plan of Treatment: This is an 83 yo M with Hx of CVA in 2012, TIA in 2017, HTN,  presents to the ED for seizure. Pt after his second stroke he was started on Keppra x750 bid . This morning he had a 15 min tonic clonic sz x 15 min terminated with 10 of valium by ems. he then had 2 more episodes of sz. given 4 mg ativan in ED and loaded with IV keppra.  received 4 L LR. At baseline patient is aao x 2 answers his name, , can understand others. He is still post ictal and arousable only to sternal rub. hemodynamically stable. creat 1.37 k 3.4, Neuro contacted by ED. he also received a dose of Rocephin in ed.    Breakthrough seizure:  - Tonic clonic SZ new onset x 3 episodes 15 min, 10 min and 5 min   - CT head: Ischemic events including an old right cerebellar infarct, old high left frontal infarct and old right thalamic lacunar infarct.  - Keppra IV 1000 BID, cw home lacosamide 200 bid,  - Neuro follow up oupt, follow up EEG report   Acute metabolic encephalopathy:  - 2/2 seizure  - Continue to monitor, back to baseline   HTN:  - BP acceptable  - Continue current meds  H/O CVA:  - Continue ASA/ statin  Hypokalemia:  - Replaced  GUY:  - Likely pre renal  - Resolved after IVF  DM:  - Hba 1c 5.9  - No need med on DC  Positive blood clx:  - Coag neg gram pos cocci, contaminated

## 2022-04-05 NOTE — DISCHARGE NOTE PROVIDER - CARE PROVIDERS DIRECT ADDRESSES
,[email or web address]

## 2022-04-05 NOTE — DISCHARGE NOTE NURSING/CASE MANAGEMENT/SOCIAL WORK - PATIENT PORTAL LINK FT
You can access the FollowMyHealth Patient Portal offered by [facility] by registering at the following website: [email or web address]. By joining [facility]’s FollowMyHealth portal, you will also be able to view your health information using other applications (apps) compatible with our system.

## 2022-04-05 NOTE — DISCHARGE NOTE PROVIDER - HOSPITAL COURSE
This is an 81 yo M with Hx of CVA in 2012, TIA in 2017, HTN,  presents to the ED for seizure. Pt after his second stroke he was started on Keppra x750 bid . This morning he had a 15 min tonic clonic sz x 15 min terminated with 10 of valium by ems. he then had 2 more episodes of sz. given 4 mg ativan in ED and loaded with IV keppra.  received 4 L LR. At baseline patient is aao x 2 answers his name, , can understand others. He is still post ictal and arousable only to sternal rub. hemodynamically stable. creat 1.37 k 3.4, Neuro contacted by ED. he also received a dose of Rocephin in ed.      Breakthrough seizure:  - Tonic clonic SZ new onset x 3 episodes 15 min, 10 min and 5 min   - CT head: Ischemic events including an old right cerebellar infarct, old high left frontal infarct and old right thalamic lacunar infarct.  - Keppra IV 1000 BID, cw home lacosamide 200 bid,  - Neuro follow up oupt, follow up EEG report     Acute metabolic encephalopathy:  - 2/2 seizure  - Continue to monitor, back to baseline     HTN:  - BP acceptable  - Continue current meds    H/O CVA:  - Continue ASA/ statin    Hypokalemia:  - Replaced      GUY:  - Likely pre renal  - Resolved after IVF    DM:  - Hba 1c 5.9  - No need med on DC    Positive blood clx:  - Coag neg gram pos cocci, contaminated

## 2022-04-05 NOTE — DISCHARGE NOTE PROVIDER - CARE PROVIDER_API CALL
Eleuterio Mcfarlane)  Clinical Neurophysiology; Neurology  [address], [address]  [place] [address]  Phone: [phone]  Fax: [phone]  Follow Up Time:     pcp,   Phone: (   )    -  Fax: (   )    -  Follow Up Time:

## 2022-04-18 DIAGNOSIS — G40.401 OTHER GENERALIZED EPILEPSY AND EPILEPTIC SYNDROMES, NOT INTRACTABLE, WITH STATUS EPILEPTICUS: ICD-10-CM

## 2022-04-18 DIAGNOSIS — N17.9 ACUTE KIDNEY FAILURE, UNSPECIFIED: ICD-10-CM

## 2022-04-18 DIAGNOSIS — E87.6 HYPOKALEMIA: ICD-10-CM

## 2022-04-18 DIAGNOSIS — R40.2141 COMA SCALE, EYES OPEN, SPONTANEOUS, IN THE FIELD [EMT OR AMBULANCE]: ICD-10-CM

## 2022-04-18 DIAGNOSIS — R40.2211 COMA SCALE, BEST VERBAL RESPONSE, NONE, IN THE FIELD [EMT OR AMBULANCE]: ICD-10-CM

## 2022-04-18 DIAGNOSIS — F03.90 UNSPECIFIED DEMENTIA WITHOUT BEHAVIORAL DISTURBANCE: ICD-10-CM

## 2022-04-18 DIAGNOSIS — R00.0 TACHYCARDIA, UNSPECIFIED: ICD-10-CM

## 2022-04-18 DIAGNOSIS — Z79.82 LONG TERM (CURRENT) USE OF ASPIRIN: ICD-10-CM

## 2022-04-18 DIAGNOSIS — Z79.4 LONG TERM (CURRENT) USE OF INSULIN: ICD-10-CM

## 2022-04-18 DIAGNOSIS — Z86.73 PERSONAL HISTORY OF TRANSIENT ISCHEMIC ATTACK (TIA), AND CEREBRAL INFARCTION WITHOUT RESIDUAL DEFICITS: ICD-10-CM

## 2022-04-18 DIAGNOSIS — I10 ESSENTIAL (PRIMARY) HYPERTENSION: ICD-10-CM

## 2022-04-18 DIAGNOSIS — E11.9 TYPE 2 DIABETES MELLITUS WITHOUT COMPLICATIONS: ICD-10-CM

## 2022-04-18 DIAGNOSIS — I69.398 OTHER SEQUELAE OF CEREBRAL INFARCTION: ICD-10-CM

## 2022-04-18 DIAGNOSIS — R40.2341 COMA SCALE, BEST MOTOR RESPONSE, FLEXION WITHDRAWAL, IN THE FIELD [EMT OR AMBULANCE]: ICD-10-CM

## 2022-04-18 DIAGNOSIS — R56.9 UNSPECIFIED CONVULSIONS: ICD-10-CM

## 2023-05-31 NOTE — ED ADULT TRIAGE NOTE - BEFAST BALANCE
[FreeTextEntry1] : XR reviewed\par Able to SLR\par Recommend gerard brace locked in extension, walker for ambulation\par Ice, nsaids\par FU Mait next week 
No

## 2024-10-23 NOTE — PROGRESS NOTE ADULT - SUBJECTIVE AND OBJECTIVE BOX
Patient is a 82y old  Male who presents with a chief complaint of seizures (2022 19:25)    INTERVAL HPI/OVERNIGHT EVENTS:  Pt was seen and examined, no acute events.    MEDICATIONS  (STANDING):  amLODIPine   Tablet 10 milliGRAM(s) Oral daily  aspirin  chewable 81 milliGRAM(s) Oral daily  atorvastatin 80 milliGRAM(s) Oral at bedtime  dextrose 5%. 1000 milliLiter(s) (50 mL/Hr) IV Continuous <Continuous>  dextrose 50% Injectable 25 Gram(s) IV Push once  enoxaparin Injectable 40 milliGRAM(s) SubCutaneous every 24 hours  glucagon  Injectable 1 milliGRAM(s) IntraMuscular once  insulin lispro (ADMELOG) corrective regimen sliding scale   SubCutaneous three times a day before meals  insulin regular  human recombinant 20 Unit(s) SubCutaneous two times a day with meals  lacosamide 200 milliGRAM(s) Oral two times a day  levETIRAcetam 1000 milliGRAM(s) Oral two times a day  lisinopril 20 milliGRAM(s) Oral daily  metoprolol succinate ER 25 milliGRAM(s) Oral daily  pantoprazole    Tablet 40 milliGRAM(s) Oral before breakfast  sodium chloride 0.9%. 1000 milliLiter(s) (75 mL/Hr) IV Continuous <Continuous>    MEDICATIONS  (PRN):  acetaminophen     Tablet .. 650 milliGRAM(s) Oral every 6 hours PRN Temp greater or equal to 38C (100.4F), Mild Pain (1 - 3)  ALBUTerol    90 MICROgram(s) HFA Inhaler 2 Puff(s) Inhalation once PRN SOB/Wheezing  aluminum hydroxide/magnesium hydroxide/simethicone Suspension 30 milliLiter(s) Oral every 4 hours PRN Dyspepsia  bisacodyl Suppository 10 milliGRAM(s) Rectal daily PRN Constipation  dextrose Oral Gel 15 Gram(s) Oral once PRN Blood Glucose LESS THAN 70 milliGRAM(s)/deciliter  melatonin 3 milliGRAM(s) Oral at bedtime PRN Insomnia  ondansetron Injectable 4 milliGRAM(s) IV Push every 8 hours PRN Nausea and/or Vomiting  polyethylene glycol 3350 17 Gram(s) Oral daily PRN Constipation      Allergies  No Known Allergies      Vital Signs Last 24 Hrs  T(C): 36.5 (2022 11:10), Max: 36.6 (2022 12:26)  T(F): 97.7 (2022 11:10), Max: 97.9 (2022 12:26)  HR: 58 (2022 11:20) (49 - 69)  BP: 133/70 (2022 11:20) (115/68 - 133/70)  BP(mean): --  RR: 18 (2022 11:20) (18 - 22)  SpO2: 95% (2022 11:20) (94% - 100%)      PHYSICAL EXAM:  GENERAL: NAD  HEAD:  Atraumatic  EYES: PERRLA   NERVOUS SYSTEM:  lethargic, arousable  CHEST/LUNG: Clear  HEART: RRR, S1, S2  ABDOMEN: Soft, non tender  EXTREMITIES:  no edema      LABS:                        12.2   6.75  )-----------( 147      ( 2022 07:16 )             37.2     04-04    140  |  108  |  9   ----------------------------<  109<H>  4.5   |  29  |  0.98    Ca    9.0      2022 07:16    TPro  7.5  /  Alb  3.2<L>  /  TBili  0.5  /  DBili  x   /  AST  24  /  ALT  22  /  AlkPhos  56  04-04    PT/INR - ( 2022 16:48 )   PT: 13.2 sec;   INR: 1.11 ratio         PTT - ( 2022 16:48 )  PTT:33.6 sec  Urinalysis Basic - ( 2022 01:03 )    Color: Yellow / Appearance: Clear / S.025 / pH: x  Gluc: x / Ketone: Negative  / Bili: Negative / Urobili: Negative mg/dL   Blood: x / Protein: 15 mg/dL / Nitrite: Negative   Leuk Esterase: Negative / RBC: 0-2 /HPF / WBC 0-2   Sq Epi: x / Non Sq Epi: Few / Bacteria: Few      CAPILLARY BLOOD GLUCOSE      POCT Blood Glucose.: 112 mg/dL (2022 09:49)  POCT Blood Glucose.: 121 mg/dL (2022 23:23)  POCT Blood Glucose.: 95 mg/dL (2022 16:29)      Culture - Blood (collected 2022 21:04)  Source: .Blood Blood-Peripheral  Gram Stain (prelim) (2022 12:13):    Growth in aerobic bottle: Gram Positive Cocci in Clusters  Preliminary Report (2022 12:12):    Growth in aerobic bottle: Gram Positive Cocci in Clusters    ***Blood Panel PCR results on this specimen are available    approximately 3 hours after the Gram stain result.***    Gram stain, PCR, and/or culture results may not always    correspond due to difference in methodologies.    ************************************************************    This PCR assay was performed by multiplex PCR. This    Assay tests for 66 bacterial and resistance gene targets.    Please refer to the [facility] test directory    at [email or web address] for details.  Organism: Blood Culture PCR (2022 14:42)  Organism: Blood Culture PCR (2022 14:42)    Culture - Blood (collected 2022 21:04)  Source: .Blood Blood-Peripheral  Gram Stain (prelim) (2022 05:48):    Growth in anaerobic bottle: Gram Positive Cocci in Clusters    Growth in aerobic bottle: Gram Positive Cocci in Clusters  Preliminary Report (2022 05:48):    Growth in anaerobic bottle: Gram Positive Cocci in Clusters    Growth in aerobic bottle: Gram Positive Cocci in Clusters      RADIOLOGY & ADDITIONAL TESTS:    Imaging Personally Reviewed:  [ ] YES  [ ] NO    Consultant(s) Notes Reviewed:  [ ] YES  [ ] NO    Care Discussed with Consultants/Other Providers [ ] YES  [ ] NO Detail Level: None Performed By: layne Urine Pregnancy Test Result: negative

## 2024-11-07 ENCOUNTER — INPATIENT (INPATIENT)
Facility: HOSPITAL | Age: 84
LOS: 4 days | Discharge: HOME | End: 2024-11-12
Attending: INTERNAL MEDICINE | Admitting: INTERNAL MEDICINE
Payer: MEDICAID

## 2024-11-07 VITALS
RESPIRATION RATE: 18 BRPM | HEART RATE: 104 BPM | SYSTOLIC BLOOD PRESSURE: 132 MMHG | WEIGHT: 199.96 LBS | OXYGEN SATURATION: 99 % | DIASTOLIC BLOOD PRESSURE: 71 MMHG | TEMPERATURE: 98 F | HEIGHT: 72 IN

## 2024-11-07 DIAGNOSIS — E78.5 HYPERLIPIDEMIA, UNSPECIFIED: ICD-10-CM

## 2024-11-07 DIAGNOSIS — N17.9 ACUTE KIDNEY FAILURE, UNSPECIFIED: ICD-10-CM

## 2024-11-07 DIAGNOSIS — G93.41 METABOLIC ENCEPHALOPATHY: ICD-10-CM

## 2024-11-07 DIAGNOSIS — A41.9 SEPSIS, UNSPECIFIED ORGANISM: ICD-10-CM

## 2024-11-07 DIAGNOSIS — Z29.9 ENCOUNTER FOR PROPHYLACTIC MEASURES, UNSPECIFIED: ICD-10-CM

## 2024-11-07 DIAGNOSIS — I10 ESSENTIAL (PRIMARY) HYPERTENSION: ICD-10-CM

## 2024-11-07 DIAGNOSIS — E11.9 TYPE 2 DIABETES MELLITUS WITHOUT COMPLICATIONS: ICD-10-CM

## 2024-11-07 DIAGNOSIS — G40.909 EPILEPSY, UNSPECIFIED, NOT INTRACTABLE, WITHOUT STATUS EPILEPTICUS: ICD-10-CM

## 2024-11-07 DIAGNOSIS — K52.9 NONINFECTIVE GASTROENTERITIS AND COLITIS, UNSPECIFIED: ICD-10-CM

## 2024-11-07 DIAGNOSIS — J69.0 PNEUMONITIS DUE TO INHALATION OF FOOD AND VOMIT: ICD-10-CM

## 2024-11-07 LAB
ALBUMIN SERPL ELPH-MCNC: 2.6 G/DL — LOW (ref 3.3–5)
ALP SERPL-CCNC: 55 U/L — SIGNIFICANT CHANGE UP (ref 40–120)
ALT FLD-CCNC: 39 U/L — SIGNIFICANT CHANGE UP (ref 12–78)
ANION GAP SERPL CALC-SCNC: 6 MMOL/L — SIGNIFICANT CHANGE UP (ref 5–17)
APTT BLD: 26.1 SEC — SIGNIFICANT CHANGE UP (ref 24.5–35.6)
AST SERPL-CCNC: 74 U/L — HIGH (ref 15–37)
BACTERIA # UR AUTO: ABNORMAL /HPF
BASOPHILS # BLD AUTO: 0.08 K/UL — SIGNIFICANT CHANGE UP (ref 0–0.2)
BASOPHILS NFR BLD AUTO: 0.5 % — SIGNIFICANT CHANGE UP (ref 0–2)
BILIRUB SERPL-MCNC: 0.6 MG/DL — SIGNIFICANT CHANGE UP (ref 0.2–1.2)
BILIRUB UR-MCNC: NEGATIVE — SIGNIFICANT CHANGE UP
BUN SERPL-MCNC: 76 MG/DL — HIGH (ref 7–23)
CALCIUM SERPL-MCNC: 9 MG/DL — SIGNIFICANT CHANGE UP (ref 8.5–10.1)
CHLORIDE SERPL-SCNC: 106 MMOL/L — SIGNIFICANT CHANGE UP (ref 96–108)
CO2 SERPL-SCNC: 30 MMOL/L — SIGNIFICANT CHANGE UP (ref 22–31)
COLOR SPEC: SIGNIFICANT CHANGE UP
CREAT SERPL-MCNC: 2.45 MG/DL — HIGH (ref 0.5–1.3)
DIFF PNL FLD: NEGATIVE — SIGNIFICANT CHANGE UP
EGFR: 25 ML/MIN/1.73M2 — LOW
EOSINOPHIL # BLD AUTO: 0.01 K/UL — SIGNIFICANT CHANGE UP (ref 0–0.5)
EOSINOPHIL NFR BLD AUTO: 0.1 % — SIGNIFICANT CHANGE UP (ref 0–6)
EPI CELLS # UR: PRESENT
FLUAV AG NPH QL: SIGNIFICANT CHANGE UP
FLUBV AG NPH QL: SIGNIFICANT CHANGE UP
GLUCOSE BLDC GLUCOMTR-MCNC: 112 MG/DL — HIGH (ref 70–99)
GLUCOSE SERPL-MCNC: 162 MG/DL — HIGH (ref 70–99)
GLUCOSE UR QL: NEGATIVE MG/DL — SIGNIFICANT CHANGE UP
HCT VFR BLD CALC: 35.6 % — LOW (ref 39–50)
HGB BLD-MCNC: 11.4 G/DL — LOW (ref 13–17)
IMM GRANULOCYTES NFR BLD AUTO: 0.7 % — SIGNIFICANT CHANGE UP (ref 0–0.9)
INR BLD: 1.33 RATIO — HIGH (ref 0.85–1.16)
KETONES UR-MCNC: ABNORMAL MG/DL
LACTATE SERPL-SCNC: 1.3 MMOL/L — SIGNIFICANT CHANGE UP (ref 0.7–2)
LEUKOCYTE ESTERASE UR-ACNC: NEGATIVE — SIGNIFICANT CHANGE UP
LYMPHOCYTES # BLD AUTO: 12.8 % — LOW (ref 13–44)
LYMPHOCYTES # BLD AUTO: 2.17 K/UL — SIGNIFICANT CHANGE UP (ref 1–3.3)
MCHC RBC-ENTMCNC: 32 G/DL — SIGNIFICANT CHANGE UP (ref 32–36)
MCHC RBC-ENTMCNC: 32.4 PG — SIGNIFICANT CHANGE UP (ref 27–34)
MCV RBC AUTO: 101.1 FL — HIGH (ref 80–100)
MONOCYTES # BLD AUTO: 1.43 K/UL — HIGH (ref 0–0.9)
MONOCYTES NFR BLD AUTO: 8.4 % — SIGNIFICANT CHANGE UP (ref 2–14)
NEUTROPHILS # BLD AUTO: 13.18 K/UL — HIGH (ref 1.8–7.4)
NEUTROPHILS NFR BLD AUTO: 77.5 % — HIGH (ref 43–77)
NITRITE UR-MCNC: NEGATIVE — SIGNIFICANT CHANGE UP
NRBC # BLD: 0 /100 WBCS — SIGNIFICANT CHANGE UP (ref 0–0)
NT-PROBNP SERPL-SCNC: 536 PG/ML — HIGH (ref 0–450)
PH UR: 5 — SIGNIFICANT CHANGE UP (ref 5–8)
PLATELET # BLD AUTO: 280 K/UL — SIGNIFICANT CHANGE UP (ref 150–400)
POTASSIUM SERPL-MCNC: 4.4 MMOL/L — SIGNIFICANT CHANGE UP (ref 3.5–5.3)
POTASSIUM SERPL-SCNC: 4.4 MMOL/L — SIGNIFICANT CHANGE UP (ref 3.5–5.3)
PROT SERPL-MCNC: 8.5 GM/DL — HIGH (ref 6–8.3)
PROT UR-MCNC: 30 MG/DL
PROTHROM AB SERPL-ACNC: 15.4 SEC — HIGH (ref 9.9–13.4)
RBC # BLD: 3.52 M/UL — LOW (ref 4.2–5.8)
RBC # FLD: 12.9 % — SIGNIFICANT CHANGE UP (ref 10.3–14.5)
RBC CASTS # UR COMP ASSIST: 2 /HPF — SIGNIFICANT CHANGE UP (ref 0–4)
SARS-COV-2 RNA SPEC QL NAA+PROBE: SIGNIFICANT CHANGE UP
SODIUM SERPL-SCNC: 142 MMOL/L — SIGNIFICANT CHANGE UP (ref 135–145)
SP GR SPEC: 1.03 — SIGNIFICANT CHANGE UP (ref 1–1.03)
TROPONIN I, HIGH SENSITIVITY RESULT: 52.3 NG/L — SIGNIFICANT CHANGE UP
UROBILINOGEN FLD QL: 1 MG/DL — SIGNIFICANT CHANGE UP (ref 0.2–1)
WBC # BLD: 16.99 K/UL — HIGH (ref 3.8–10.5)
WBC # FLD AUTO: 16.99 K/UL — HIGH (ref 3.8–10.5)
WBC UR QL: 2 /HPF — SIGNIFICANT CHANGE UP (ref 0–5)

## 2024-11-07 PROCEDURE — 71250 CT THORAX DX C-: CPT | Mod: 26,MC

## 2024-11-07 PROCEDURE — 99285 EMERGENCY DEPT VISIT HI MDM: CPT

## 2024-11-07 PROCEDURE — 74176 CT ABD & PELVIS W/O CONTRAST: CPT | Mod: 26,MC

## 2024-11-07 PROCEDURE — 93010 ELECTROCARDIOGRAM REPORT: CPT

## 2024-11-07 PROCEDURE — 99223 1ST HOSP IP/OBS HIGH 75: CPT

## 2024-11-07 PROCEDURE — 71045 X-RAY EXAM CHEST 1 VIEW: CPT | Mod: 26

## 2024-11-07 RX ORDER — DIVALPROEX SODIUM 250 MG/1
1 TABLET, FILM COATED, EXTENDED RELEASE ORAL
Refills: 0 | DISCHARGE

## 2024-11-07 RX ORDER — LACOSAMIDE 100 MG/1
150 TABLET ORAL EVERY 12 HOURS
Refills: 0 | Status: DISCONTINUED | OUTPATIENT
Start: 2024-11-07 | End: 2024-11-09

## 2024-11-07 RX ORDER — AZITHROMYCIN DIHYDRATE 200 MG/5ML
500 POWDER, FOR SUSPENSION ORAL ONCE
Refills: 0 | Status: COMPLETED | OUTPATIENT
Start: 2024-11-07 | End: 2024-11-07

## 2024-11-07 RX ORDER — VANCOMYCIN HYDROCHLORIDE 50 MG/ML
1000 KIT ORAL ONCE
Refills: 0 | Status: COMPLETED | OUTPATIENT
Start: 2024-11-07 | End: 2024-11-07

## 2024-11-07 RX ORDER — SODIUM CHLORIDE 9 MG/ML
1000 INJECTION, SOLUTION INTRAMUSCULAR; INTRAVENOUS; SUBCUTANEOUS ONCE
Refills: 0 | Status: COMPLETED | OUTPATIENT
Start: 2024-11-07 | End: 2024-11-07

## 2024-11-07 RX ORDER — VALPROIC ACID 250 MG/5ML
500 SOLUTION ORAL THREE TIMES A DAY
Refills: 0 | Status: DISCONTINUED | OUTPATIENT
Start: 2024-11-07 | End: 2024-11-09

## 2024-11-07 RX ORDER — LEVETIRACETAM 500 MG/1
1000 TABLET, FILM COATED ORAL EVERY 12 HOURS
Refills: 0 | Status: DISCONTINUED | OUTPATIENT
Start: 2024-11-07 | End: 2024-11-07

## 2024-11-07 RX ORDER — INSULIN LISPRO 100/ML
VIAL (ML) SUBCUTANEOUS
Refills: 0 | Status: DISCONTINUED | OUTPATIENT
Start: 2024-11-07 | End: 2024-11-12

## 2024-11-07 RX ORDER — LEVETIRACETAM 500 MG/1
500 TABLET, FILM COATED ORAL EVERY 12 HOURS
Refills: 0 | Status: DISCONTINUED | OUTPATIENT
Start: 2024-11-07 | End: 2024-11-09

## 2024-11-07 RX ORDER — HEPARIN SODIUM 10000 [USP'U]/ML
5000 INJECTION INTRAVENOUS; SUBCUTANEOUS EVERY 12 HOURS
Refills: 0 | Status: DISCONTINUED | OUTPATIENT
Start: 2024-11-07 | End: 2024-11-12

## 2024-11-07 RX ORDER — INSULIN LISPRO 100/ML
VIAL (ML) SUBCUTANEOUS AT BEDTIME
Refills: 0 | Status: DISCONTINUED | OUTPATIENT
Start: 2024-11-07 | End: 2024-11-12

## 2024-11-07 RX ORDER — SODIUM CHLORIDE 9 MG/ML
500 INJECTION, SOLUTION INTRAMUSCULAR; INTRAVENOUS; SUBCUTANEOUS ONCE
Refills: 0 | Status: COMPLETED | OUTPATIENT
Start: 2024-11-07 | End: 2024-11-07

## 2024-11-07 RX ORDER — PIPERACILLIN AND TAZOBACTAM .5; 4 G/20ML; G/20ML
3.38 INJECTION, POWDER, LYOPHILIZED, FOR SOLUTION INTRAVENOUS ONCE
Refills: 0 | Status: COMPLETED | OUTPATIENT
Start: 2024-11-07 | End: 2024-11-07

## 2024-11-07 RX ORDER — INSULIN GLARGINE,HUM.REC.ANLOG 100/ML
10 VIAL (ML) SUBCUTANEOUS AT BEDTIME
Refills: 0 | Status: DISCONTINUED | OUTPATIENT
Start: 2024-11-07 | End: 2024-11-09

## 2024-11-07 RX ORDER — INSULIN GLARGINE,HUM.REC.ANLOG 100/ML
10 VIAL (ML) SUBCUTANEOUS EVERY MORNING
Refills: 0 | Status: DISCONTINUED | OUTPATIENT
Start: 2024-11-07 | End: 2024-11-07

## 2024-11-07 RX ORDER — AZITHROMYCIN DIHYDRATE 200 MG/5ML
500 POWDER, FOR SUSPENSION ORAL EVERY 24 HOURS
Refills: 0 | Status: DISCONTINUED | OUTPATIENT
Start: 2024-11-08 | End: 2024-11-09

## 2024-11-07 RX ORDER — PIPERACILLIN AND TAZOBACTAM .5; 4 G/20ML; G/20ML
3.38 INJECTION, POWDER, LYOPHILIZED, FOR SOLUTION INTRAVENOUS EVERY 8 HOURS
Refills: 0 | Status: DISCONTINUED | OUTPATIENT
Start: 2024-11-08 | End: 2024-11-12

## 2024-11-07 RX ORDER — INSULIN GLARGINE,HUM.REC.ANLOG 100/ML
10 VIAL (ML) SUBCUTANEOUS EVERY MORNING
Refills: 0 | Status: DISCONTINUED | OUTPATIENT
Start: 2024-11-08 | End: 2024-11-09

## 2024-11-07 RX ORDER — LACOSAMIDE 100 MG/1
200 TABLET ORAL EVERY 12 HOURS
Refills: 0 | Status: DISCONTINUED | OUTPATIENT
Start: 2024-11-07 | End: 2024-11-07

## 2024-11-07 RX ORDER — GLUCAGON INJECTION, SOLUTION 1 MG/.2ML
1 INJECTION, SOLUTION SUBCUTANEOUS ONCE
Refills: 0 | Status: DISCONTINUED | OUTPATIENT
Start: 2024-11-07 | End: 2024-11-12

## 2024-11-07 RX ORDER — ACETAMINOPHEN 500 MG
1000 TABLET ORAL ONCE
Refills: 0 | Status: COMPLETED | OUTPATIENT
Start: 2024-11-07 | End: 2024-11-07

## 2024-11-07 RX ADMIN — SODIUM CHLORIDE 1000 MILLILITER(S): 9 INJECTION, SOLUTION INTRAMUSCULAR; INTRAVENOUS; SUBCUTANEOUS at 15:11

## 2024-11-07 RX ADMIN — LACOSAMIDE 130 MILLIGRAM(S): 100 TABLET ORAL at 23:26

## 2024-11-07 RX ADMIN — SODIUM CHLORIDE 1000 MILLILITER(S): 9 INJECTION, SOLUTION INTRAMUSCULAR; INTRAVENOUS; SUBCUTANEOUS at 16:28

## 2024-11-07 RX ADMIN — PIPERACILLIN AND TAZOBACTAM 200 GRAM(S): .5; 4 INJECTION, POWDER, LYOPHILIZED, FOR SOLUTION INTRAVENOUS at 14:13

## 2024-11-07 RX ADMIN — SODIUM CHLORIDE 1000 MILLILITER(S): 9 INJECTION, SOLUTION INTRAMUSCULAR; INTRAVENOUS; SUBCUTANEOUS at 15:28

## 2024-11-07 RX ADMIN — VALPROIC ACID 55 MILLIGRAM(S): 250 SOLUTION ORAL at 22:14

## 2024-11-07 RX ADMIN — PIPERACILLIN AND TAZOBACTAM 3.38 GRAM(S): .5; 4 INJECTION, POWDER, LYOPHILIZED, FOR SOLUTION INTRAVENOUS at 14:43

## 2024-11-07 RX ADMIN — SODIUM CHLORIDE 1000 MILLILITER(S): 9 INJECTION, SOLUTION INTRAMUSCULAR; INTRAVENOUS; SUBCUTANEOUS at 14:11

## 2024-11-07 RX ADMIN — Medication 400 MILLIGRAM(S): at 14:11

## 2024-11-07 RX ADMIN — SODIUM CHLORIDE 500 MILLILITER(S): 9 INJECTION, SOLUTION INTRAMUSCULAR; INTRAVENOUS; SUBCUTANEOUS at 20:03

## 2024-11-07 RX ADMIN — PIPERACILLIN AND TAZOBACTAM 25 GRAM(S): .5; 4 INJECTION, POWDER, LYOPHILIZED, FOR SOLUTION INTRAVENOUS at 19:52

## 2024-11-07 RX ADMIN — Medication 1000 MILLIGRAM(S): at 14:26

## 2024-11-07 RX ADMIN — AZITHROMYCIN DIHYDRATE 255 MILLIGRAM(S): 200 POWDER, FOR SUSPENSION ORAL at 20:03

## 2024-11-07 RX ADMIN — LEVETIRACETAM 600 MILLIGRAM(S): 500 TABLET, FILM COATED ORAL at 21:19

## 2024-11-07 RX ADMIN — Medication 1000 MILLIGRAM(S): at 15:11

## 2024-11-07 RX ADMIN — VANCOMYCIN HYDROCHLORIDE 1000 MILLIGRAM(S): KIT at 16:34

## 2024-11-07 RX ADMIN — VANCOMYCIN HYDROCHLORIDE 250 MILLIGRAM(S): KIT at 15:34

## 2024-11-07 NOTE — H&P ADULT - ASSESSMENT
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 Admitted for sepsis

## 2024-11-07 NOTE — ED PROVIDER NOTE - RATE
Medication Refill    Medication needing refilled: Clindamycin - PT states she has a dentist appt Monday morning 4/29/24 and will have 2nd  appt on another day     Dosage of the medication: 300 mg    How are you taking this medication (QD, BID, TID, QID, PRN):    30 or 90 day supply called in:    When will you run out of your medication:    Which Pharmacy are we sending the medication to?: [facility] - [place], [place] - [address] - P [phone] - F [phone]        104

## 2024-11-07 NOTE — ED PROVIDER NOTE - CLINICAL SUMMARY MEDICAL DECISION MAKING FREE TEXT BOX
84M PMH HTN, HLD, DM, CVA w/ residual L deficits, bedbound, sacral decubitus ulcer, seizure disorder BIBEMS from home d/t weakness. Rectal T 100.4, tachycardic to low 100s, RR 20s-40s, on 5L NC. Plan for Code Sepsis, empiric IVF, IV abx and antipyretics. Anticipate admission. 84M PMH HTN, HLD, DM, CVA w/ residual L deficits - bedbound, sacral decubitus ulcer, seizure disorder BIBEMS from home d/t weakness. Rectal T 100.4, tachycardic to low 100s, RR 20s-40s, on 5L NC. Plan for Code Sepsis, empiric IVF, IV abx and antipyretics. Anticipate admission.   W/u significant for: + leukocytosis to 16.9, Cr 2.45 (BUN/Cr WNL 2022). Lactate, trop WNL. Flu/COVID negative. Pt care signed out to incoming team at change of shift, pending CT imaging and UA. 84M PMH HTN, HLD, DM, CVA w/ residual L deficits - bedbound, sacral decubitus ulcer, seizure disorder BIBEMS from home d/t weakness. Rectal T 100.4, tachycardic to low 100s, RR 20s-40s, on 5L NC. Plan for Code Sepsis, empiric IVF, IV abx and antipyretics. Anticipate admission.   W/u significant for: + leukocytosis to 16.9, Cr 2.45 (BUN/Cr WNL 2022). Lactate, trop WNL. Flu/COVID negative. Pt care signed out to incoming team at change of shift, pending CT imaging and UA.    Attending note (Prosper): 20:01 - Received at signout from prior physician was awaiting results of imaging being treated for sepsis.  CT imaging chest and abdomen/pelvis obtained results concerning for aspiration pneumonia.  Family (daughter) at bedside updated of results and plan for admission.  Patient remains hemodynamically stable s/p 2 L fluid on nasal cannula 2 L breathing comfortably.  Care endorsed to hospitalist for admission.  Given recent hospitalization questionable intubation concern for HCAP or ventilator associated have already received broad-spectrum antibiotics with vancomycin and Zosyn and will add on azithromycin for possible atypical coverage.  Zosyn should provide good coverage for any abdominal/GI related bacteria if aspiration indeed the etiology.  Lactate not elevated GUY noted with no recent baseline.

## 2024-11-07 NOTE — H&P ADULT - CONVERSATION DETAILS
Explained cardiopulmonary resuscitative measures including CPR, and invasive ventilation relating to artificial life support. They said patient was recently intubated and they want patient to Explained cardiopulmonary resuscitative measures including CPR, and invasive ventilation relating to artificial life support. They said patient was recently intubated and they want patient to be FULL CODE. Time spent 5mins

## 2024-11-07 NOTE — ED PROVIDER NOTE - OBJECTIVE STATEMENT
84M PMH HTN, HLD, DM, CVA w/ residual L deficits, bedbound, sacral decubitus ulcer, seizure disorder BIBEMS from home d/t weakness. Per daughter and HHA at bedside, pt s/p recent admission x2 (1st admission pt 'placed on ventilator,' 2nd admission d/t infection s/p ventilator, pt was d/c'd home last Friday, d/c'd w/ O2 tank). Pt w/ ED visit x2 since Friday, today being 3rd ED visit. Pt w/ increased lethargy / generalized weakness. Per daughter, pt usually speaks when spoken too, now minimally verbal. Pt typically w/ good appetite, but not eating / drinking x past 1wk, w/o BM and w/ decreased urination. Pt audible chest congestion. Pt w/o cough, vomiting, diarrhea, foul-smelling urine.     PMH as above, PSH none, NKDA, Meds as listed. 84M PMH HTN, HLD, DM, CVA w/ residual L deficits - bedbound, sacral decubitus ulcer, seizure disorder BIBEMS from home d/t weakness. Per daughter and HHA at bedside, pt s/p recent admission x2 (1st admission pt 'placed on ventilator,' 2nd admission d/t infection s/p ventilator, pt was d/c'd home last Friday, d/c'd w/ O2 tank). Pt w/ ED visit x2 since Friday, today being 3rd ED visit. Pt w/ increased lethargy / generalized weakness. Per daughter, pt usually speaks when spoken too, now minimally verbal. Pt typically w/ good appetite, but not eating / drinking x past 1wk, w/o BM and w/ decreased urination. Pt audible chest congestion. Pt w/o cough, vomiting, diarrhea, foul-smelling urine.     PMH as above, PSH none, NKDA, Meds as listed.

## 2024-11-07 NOTE — H&P ADULT - PROBLEM SELECTOR PLAN 1
- WBC 17, Temp 100.4,   - Lactate 1.3  - P/w acute metabolic encephalopathy. At baseline, patient can respond to simple questions and has good appetite  - COVID, flu negative  - UA negative  - CT chest = Lower lobe bronchial wall thickening with endobronchial debris and superimposed unless opacities, suspicious for infection including aspiration pneumonia in the appropriate clinical setting.  - Discharged from another hospital 5 days ago. Was intubated.  - Vanc + Zosyn + Azithromycin  - C/w Zosyn, Azithromycin. Please c/w Vanc based on pharmacy guidelines  - F/u legionella, strep, mycoplasma  - F/u sputum culture  - F/u MRSA  - F/u cultures

## 2024-11-07 NOTE — ED PROVIDER NOTE - CARE PLAN
1 Principal Discharge DX:	Weakness   Principal Discharge DX:	Pneumonia  Secondary Diagnosis:	Weakness  Secondary Diagnosis:	GUY (acute kidney injury)

## 2024-11-07 NOTE — ED ADULT TRIAGE NOTE - CHIEF COMPLAINT QUOTE
worsening intermittent chest congestion, sob, lethargy and not eating . EMS reports low bp systolic 89 to 92 and gave 750mls normal saline. Recently d/c from [facility]  last Friday.  h/o sacral ulcer, dm, seizures, cva with left sided weakness. worsening intermittent chest congestion, sob, lethargy and not eating . EMS reports low bp systolic 89 to 92 and gave 750mls normal saline. via 18 G right forearm . Recently d/c from [facility]  last Friday.  h/o sacral ulcer, dm, seizures, cva with left sided weakness.

## 2024-11-07 NOTE — ED ADULT TRIAGE NOTE - PATIENT'S PREFERRED PRONOUN
MEDICATIONS  (STANDING):  acetaminophen   IVPB .. 1000 milliGRAM(s) IV Intermittent every 6 hours  cefTRIAXone   IVPB 1000 milliGRAM(s) IV Intermittent every 24 hours  chlorhexidine 4% Liquid 1 Application(s) Topical <User Schedule>  CRRT Treatment    <Continuous>  heparin   Injectable 5000 Unit(s) SubCutaneous every 8 hours  lactated ringers. 1000 milliLiter(s) (100 mL/Hr) IV Continuous <Continuous>  metroNIDAZOLE  IVPB 500 milliGRAM(s) IV Intermittent every 8 hours  PrismaSOL Filtration BGK 0 / 2.5 5000 milliLiter(s) (800 mL/Hr) CRRT <Continuous>  PrismaSOL Filtration BGK 0 / 2.5 5000 milliLiter(s) (200 mL/Hr) CRRT <Continuous>  PrismaSOL Filtration BGK 0 / 2.5 5000 milliLiter(s) (1000 mL/Hr) CRRT <Continuous>    MEDICATIONS  (PRN):  sodium chloride 0.9% lock flush 10 milliLiter(s) IV Push every 1 hour PRN Pre/post blood products, medications, blood draw, and to maintain line patency   Him/He

## 2024-11-07 NOTE — H&P ADULT - NSHPPHYSICALEXAM_GEN_ALL_CORE
GENERAL: NAD  HEAD: Atraumatic, Normocephalic  EYES: EOMI. Conjunctiva and sclera clear  NECK: Supple  CHEST/LUNG: Mild BL rhonchi  HEART: Regular rate and rhythm; No murmurs  ABDOMEN: Soft, Nontender, Nondistended; Bowel sounds present  EXTREMITIES: No edema  NEURO: AAOx0, responds to pain. Opens eyes. Moves extremities  SKIN: Stage 2 sacral wound

## 2024-11-07 NOTE — H&P ADULT - PROBLEM SELECTOR PLAN 3
- CT abd = There is mild wall thickening of the rectosigmoid colon, which could be due to underdistention or a superimposed proctocolitis.

## 2024-11-07 NOTE — ED ADULT NURSE NOTE - OBJECTIVE STATEMENT
84 year old male awake and alert biba and accompanied by daughter and home health aide c/o worsening chest congestion, sob, and weakness x 2 months, daughter reports third ER visit in 2 months, daughter reports at baseline pt is bedbound and able to talk but baseline is A/Ox1-2, daughter also c/o constipation and decreased appetite, noted sacral pressure injury in healing stage, noted stage 1, dressing dry, clean and intact, noted 18G to left wrist placed by EMS, as per EMS pt received 750 mL of NS on route

## 2024-11-07 NOTE — ED ADULT NURSE NOTE - CHIEF COMPLAINT QUOTE
worsening intermittent chest congestion, sob, lethargy and not eating . EMS reports low bp systolic 89 to 92 and gave 750mls normal saline. via 18 G right forearm . Recently d/c from [facility]  last Friday.  h/o sacral ulcer, dm, seizures, cva with left sided weakness.

## 2024-11-07 NOTE — H&P ADULT - HISTORY OF PRESENT ILLNESS
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 Per daughters (one at bedside, another on the phone on speaker), the patient was discharged from [facility] days ago after being treated for influenza, and then superimposed pneumonia. Patient was on mechanical ventilator. Since the patient got home, he has been lethargic, less responsive, and eating less. Patient can sometimes answer yes or no but has been less responsive. Patient also had cough, vomiting, diarrhea, foul-smelling urine.

## 2024-11-07 NOTE — PATIENT PROFILE ADULT - FALL HARM RISK - HARM RISK INTERVENTIONS

## 2024-11-07 NOTE — ED PROVIDER NOTE - PHYSICAL EXAMINATION
GEN: Lethargic, ill-appearing.   HEAD AND NECK: NC/AT. Airway patent. Neck supple.   EYES:  Clear b/l. EOMI. PERRL.   ENT: Moist mucus membranes.   CARDIAC: Tachycardic, regular rhythm. No evident pedal edema.    RESP/CHEST: Normal respiratory effort with no use of accessory muscles or retractions. Clear throughout on auscultation.  ABD: Soft, non-distended, non-tender. No rebound, no guarding.   BACK: No midline spinal TTP. No CVAT.   EXTREMITIES: Moving all extremities with no apparent deformities.   SKIN: Warm, dry, normal color.  NEURO: No focal deficits. GEN: Lethargic, ill-appearing.   HEAD AND NECK: NC/AT. Airway patent. Neck supple.   EYES:  Clear b/l. EOMI. PERRL.   ENT: Moist mucus membranes.   CARDIAC: Tachycardic, regular rhythm. No evident pedal edema.    RESP/CHEST: Tachypneic. Clear throughout on auscultation.  ABD: Soft, non-distended, non-tender. No rebound, no guarding.   BACK: No midline spinal TTP. No CVAT.   EXTREMITIES: Moving all extremities with no apparent deformities.   SKIN: Warm, dry, normal color.  NEURO: No focal deficits.

## 2024-11-07 NOTE — ED ADULT NURSE NOTE - NSFALLRISKINTERV_ED_ALL_ED
Assistance OOB with selected safe patient handling equipment if applicable/Assistance with ambulation/Communicate fall risk and risk factors to all staff, patient, and family/Provide patient with walking aids/Provide visual cue: yellow wristband, yellow gown, etc/Reinforce activity limits and safety measures with patient and family/Call bell, personal items and telephone in reach/Instruct patient to call for assistance before getting out of bed/chair/stretcher/Non-slip footwear applied when patient is off stretcher/[place] to call system/Physically safe environment - no spills, clutter or unnecessary equipment/Purposeful Proactive Rounding/Room/bathroom lighting operational, light cord in reach

## 2024-11-07 NOTE — H&P ADULT - PROBLEM SELECTOR PLAN 6
- Home meds lacosamide, levetiracetam, divalproex  - Continue as IV - Home meds lacosamide, levetiracetam, divalproex  - Continue as IV  - Given GUY resulting in CrCl<30, will temporarily decrease Lacosamide from 200->150; Levetiracetam from 1000 ->500. Discussed with pharmacy

## 2024-11-07 NOTE — H&P ADULT - PROBLEM SELECTOR PLAN 7
- Home med NovoLIN N 22U in AM, 20U in PM  - NPO for now  - Will start Lantus 10U in AM and 10U at night  - ISS + FS q6hrs

## 2024-11-07 NOTE — ED ADULT NURSE NOTE - NSICDXPASTMEDICALHX_GEN_ALL_CORE_FT
PAST MEDICAL HISTORY:  Cerebrovascular accident (CVA), unspecified mechanism 2008, 2012    Diabetes mellitus type 2, insulin dependent     Essential hypertension     Hyperlipidemia, unspecified hyperlipidemia type     Seizure disorder

## 2024-11-07 NOTE — H&P ADULT - PROBLEM SELECTOR PLAN 8
- Home med Lisinopril 20mg, HCTZ 12.5mg, Metoprolol 25mg BID   - Hold home meds in the setting of sepsis

## 2024-11-08 LAB
A1C WITH ESTIMATED AVERAGE GLUCOSE RESULT: 5.6 % — SIGNIFICANT CHANGE UP (ref 4–5.6)
ALBUMIN SERPL ELPH-MCNC: 2.3 G/DL — LOW (ref 3.3–5)
ALP SERPL-CCNC: 46 U/L — SIGNIFICANT CHANGE UP (ref 40–120)
ALT FLD-CCNC: 31 U/L — SIGNIFICANT CHANGE UP (ref 12–78)
ANION GAP SERPL CALC-SCNC: 5 MMOL/L — SIGNIFICANT CHANGE UP (ref 5–17)
AST SERPL-CCNC: 56 U/L — HIGH (ref 15–37)
BILIRUB SERPL-MCNC: 0.5 MG/DL — SIGNIFICANT CHANGE UP (ref 0.2–1.2)
BUN SERPL-MCNC: 51 MG/DL — HIGH (ref 7–23)
CALCIUM SERPL-MCNC: 8.5 MG/DL — SIGNIFICANT CHANGE UP (ref 8.5–10.1)
CHLORIDE SERPL-SCNC: 110 MMOL/L — HIGH (ref 96–108)
CO2 SERPL-SCNC: 32 MMOL/L — HIGH (ref 22–31)
CREAT SERPL-MCNC: 1.35 MG/DL — HIGH (ref 0.5–1.3)
EGFR: 52 ML/MIN/1.73M2 — LOW
ESTIMATED AVERAGE GLUCOSE: 114 MG/DL — SIGNIFICANT CHANGE UP (ref 68–114)
GLUCOSE BLDC GLUCOMTR-MCNC: 100 MG/DL — HIGH (ref 70–99)
GLUCOSE BLDC GLUCOMTR-MCNC: 107 MG/DL — HIGH (ref 70–99)
GLUCOSE BLDC GLUCOMTR-MCNC: 147 MG/DL — HIGH (ref 70–99)
GLUCOSE BLDC GLUCOMTR-MCNC: 148 MG/DL — HIGH (ref 70–99)
GLUCOSE BLDC GLUCOMTR-MCNC: 170 MG/DL — HIGH (ref 70–99)
GLUCOSE SERPL-MCNC: 150 MG/DL — HIGH (ref 70–99)
HCT VFR BLD CALC: 30.4 % — LOW (ref 39–50)
HGB BLD-MCNC: 9.4 G/DL — LOW (ref 13–17)
LEGIONELLA AG UR QL: NEGATIVE — SIGNIFICANT CHANGE UP
MCHC RBC-ENTMCNC: 30.9 G/DL — LOW (ref 32–36)
MCHC RBC-ENTMCNC: 32.3 PG — SIGNIFICANT CHANGE UP (ref 27–34)
MCV RBC AUTO: 104.5 FL — HIGH (ref 80–100)
MRSA PCR RESULT.: SIGNIFICANT CHANGE UP
NRBC # BLD: 0 /100 WBCS — SIGNIFICANT CHANGE UP (ref 0–0)
PLATELET # BLD AUTO: 236 K/UL — SIGNIFICANT CHANGE UP (ref 150–400)
POTASSIUM SERPL-MCNC: 3.9 MMOL/L — SIGNIFICANT CHANGE UP (ref 3.5–5.3)
POTASSIUM SERPL-SCNC: 3.9 MMOL/L — SIGNIFICANT CHANGE UP (ref 3.5–5.3)
PROT SERPL-MCNC: 7 GM/DL — SIGNIFICANT CHANGE UP (ref 6–8.3)
RBC # BLD: 2.91 M/UL — LOW (ref 4.2–5.8)
RBC # FLD: 12 % — SIGNIFICANT CHANGE UP (ref 10.3–14.5)
S AUREUS DNA NOSE QL NAA+PROBE: SIGNIFICANT CHANGE UP
S PNEUM AG UR QL: NEGATIVE — SIGNIFICANT CHANGE UP
SODIUM SERPL-SCNC: 147 MMOL/L — HIGH (ref 135–145)
WBC # BLD: 9.8 K/UL — SIGNIFICANT CHANGE UP (ref 3.8–10.5)
WBC # FLD AUTO: 9.8 K/UL — SIGNIFICANT CHANGE UP (ref 3.8–10.5)

## 2024-11-08 PROCEDURE — 99233 SBSQ HOSP IP/OBS HIGH 50: CPT

## 2024-11-08 RX ORDER — NYSTATIN 100000 U/G
1 POWDER TOPICAL
Refills: 0 | Status: DISCONTINUED | OUTPATIENT
Start: 2024-11-08 | End: 2024-11-12

## 2024-11-08 RX ORDER — SENNA 187 MG
2 TABLET ORAL AT BEDTIME
Refills: 0 | Status: DISCONTINUED | OUTPATIENT
Start: 2024-11-08 | End: 2024-11-12

## 2024-11-08 RX ORDER — GUAIFENESIN 100 MG/5ML
100 LIQUID ORAL EVERY 6 HOURS
Refills: 0 | Status: DISCONTINUED | OUTPATIENT
Start: 2024-11-08 | End: 2024-11-12

## 2024-11-08 RX ADMIN — LEVETIRACETAM 400 MILLIGRAM(S): 500 TABLET, FILM COATED ORAL at 18:15

## 2024-11-08 RX ADMIN — NYSTATIN 1 APPLICATION(S): 100000 POWDER TOPICAL at 05:04

## 2024-11-08 RX ADMIN — HEPARIN SODIUM 5000 UNIT(S): 10000 INJECTION INTRAVENOUS; SUBCUTANEOUS at 05:04

## 2024-11-08 RX ADMIN — VALPROIC ACID 55 MILLIGRAM(S): 250 SOLUTION ORAL at 14:20

## 2024-11-08 RX ADMIN — NYSTATIN 1 APPLICATION(S): 100000 POWDER TOPICAL at 18:15

## 2024-11-08 RX ADMIN — Medication 1: at 18:16

## 2024-11-08 RX ADMIN — GUAIFENESIN 100 MILLIGRAM(S): 100 LIQUID ORAL at 21:39

## 2024-11-08 RX ADMIN — LEVETIRACETAM 600 MILLIGRAM(S): 500 TABLET, FILM COATED ORAL at 05:02

## 2024-11-08 RX ADMIN — VALPROIC ACID 55 MILLIGRAM(S): 250 SOLUTION ORAL at 05:02

## 2024-11-08 RX ADMIN — Medication 10 UNIT(S): at 21:40

## 2024-11-08 RX ADMIN — PIPERACILLIN AND TAZOBACTAM 25 GRAM(S): .5; 4 INJECTION, POWDER, LYOPHILIZED, FOR SOLUTION INTRAVENOUS at 23:04

## 2024-11-08 RX ADMIN — PIPERACILLIN AND TAZOBACTAM 25 GRAM(S): .5; 4 INJECTION, POWDER, LYOPHILIZED, FOR SOLUTION INTRAVENOUS at 05:02

## 2024-11-08 RX ADMIN — HEPARIN SODIUM 5000 UNIT(S): 10000 INJECTION INTRAVENOUS; SUBCUTANEOUS at 18:16

## 2024-11-08 RX ADMIN — LACOSAMIDE 130 MILLIGRAM(S): 100 TABLET ORAL at 11:56

## 2024-11-08 RX ADMIN — Medication 2 TABLET(S): at 21:20

## 2024-11-08 RX ADMIN — LACOSAMIDE 130 MILLIGRAM(S): 100 TABLET ORAL at 23:04

## 2024-11-08 RX ADMIN — VALPROIC ACID 55 MILLIGRAM(S): 250 SOLUTION ORAL at 21:57

## 2024-11-08 RX ADMIN — Medication 75 MILLILITER(S): at 08:40

## 2024-11-08 RX ADMIN — AZITHROMYCIN DIHYDRATE 255 MILLIGRAM(S): 200 POWDER, FOR SUSPENSION ORAL at 20:53

## 2024-11-08 RX ADMIN — PIPERACILLIN AND TAZOBACTAM 25 GRAM(S): .5; 4 INJECTION, POWDER, LYOPHILIZED, FOR SOLUTION INTRAVENOUS at 14:48

## 2024-11-08 NOTE — DIETITIAN INITIAL EVALUATION ADULT - NUTRITON FOCUSED PHYSICAL EXAM
Problem: Discharge Planning  Goal: Discharge to home or other facility with appropriate resources  Outcome: Progressing  Flowsheets (Taken 8/7/2024 2030)  Discharge to home or other facility with appropriate resources: Identify barriers to discharge with patient and caregiver     Problem: Pain  Goal: Verbalizes/displays adequate comfort level or baseline comfort level  Outcome: Progressing     Problem: Safety - Adult  Goal: Free from fall injury  Outcome: Progressing     Problem: Skin/Tissue Integrity  Goal: Absence of new skin breakdown  Outcome: Progressing     Problem: Chronic Conditions and Co-morbidities  Goal: Patient's chronic conditions and co-morbidity symptoms are monitored and maintained or improved  Outcome: Progressing  Flowsheets (Taken 8/7/2024 2030)  Care Plan - Patient's Chronic Conditions and Co-Morbidity Symptoms are Monitored and Maintained or Improved: Monitor and assess patient's chronic conditions and comorbid symptoms for stability, deterioration, or improvement      yes...

## 2024-11-08 NOTE — DIETITIAN INITIAL EVALUATION ADULT - PROBLEM SELECTOR PLAN 6
- Home meds lacosamide, levetiracetam, divalproex  - Continue as IV  - Given GUY resulting in CrCl<30, will temporarily decrease Lacosamide from 200->150; Levetiracetam from 1000 ->500. Discussed with pharmacy

## 2024-11-08 NOTE — PROGRESS NOTE ADULT - SUBJECTIVE AND OBJECTIVE BOX
CHIEF COMPLAINT: still sleepy  +SOB  + cough  no fever  no diarrhea  no active gross bleeding reported       PHYSICAL EXAM:    GENERAL: Moderately built, no acute distress   CHEST/LUNG:  No wheezing, + crackles bilaterally   HEART: Regular rate and rhythm; No murmurs  ABDOMEN: Soft, Nontender, Nondistended; Bowel sounds present  EXTREMITIES:  No clubbing, cyanosis, or edema  Psychiatry: sleepy but easily arousable. followed some simple commands.       OBJECTIVE DATA:   Vital Signs Last 24 Hrs  T(C): 36.7 (08 Nov 2024 05:33), Max: 38 (07 Nov 2024 13:24)  T(F): 98 (08 Nov 2024 05:33), Max: 100.4 (07 Nov 2024 13:24)  HR: 68 (08 Nov 2024 05:33) (64 - 104)  BP: 112/68 (08 Nov 2024 05:33) (98/61 - 132/71)  BP(mean): --  RR: 18 (08 Nov 2024 05:33) (12 - 20)  SpO2: 99% (08 Nov 2024 05:33) (99% - 100%)    Parameters below as of 08 Nov 2024 05:33  Patient On (Oxygen Delivery Method): nasal cannula  O2 Flow (L/min): 3             Daily Height in cm: 182.88 (07 Nov 2024 12:54)    LABS:                        9.4    9.80  )-----------( 236      ( 08 Nov 2024 07:16 )             30.4             11-08    147[H]  |  110[H]  |  51[H]  ----------------------------<  150[H]  3.9   |  32[H]  |  1.35[H]    Ca    8.5      08 Nov 2024 07:16    TPro  7.0  /  Alb  2.3[L]  /  TBili  0.5  /  DBili  x   /  AST  56[H]  /  ALT  31  /  AlkPhos  46  11-08              PT/INR - ( 07 Nov 2024 14:01 )   PT: 15.4 sec;   INR: 1.33 ratio         PTT - ( 07 Nov 2024 14:01 )  PTT:26.1 sec  Urinalysis Basic - ( 08 Nov 2024 07:16 )    Color: x / Appearance: x / SG: x / pH: x  Gluc: 150 mg/dL / Ketone: x  / Bili: x / Urobili: x   Blood: x / Protein: x / Nitrite: x   Leuk Esterase: x / RBC: x / WBC x   Sq Epi: x / Non Sq Epi: x / Bacteria: x            CAPILLARY BLOOD GLUCOSE      POCT Blood Glucose.: 107 mg/dL (08 Nov 2024 06:19)         Interval Radiology studies: reviewed by me  < from: CT Abdomen and Pelvis No Cont (11.07.24 @ 16:40) >  IMPRESSION:  Lowerlobe bronchial wall thickening with endobronchial debris and   superimposed unless opacities, suspicious for infection including   aspiration pneumonia in the appropriate clinical setting.    There is mild wall thickening of the rectosigmoid colon, which could be   due to underdistention or a superimposed proctocolitis.    No additional infectious process identified within t    < end of copied text >      MEDICATIONS  (STANDING):  azithromycin  IVPB 500 milliGRAM(s) IV Intermittent every 24 hours  dextrose 5%. 1000 milliLiter(s) (100 mL/Hr) IV Continuous <Continuous>  dextrose 5%. 1000 milliLiter(s) (75 mL/Hr) IV Continuous <Continuous>  dextrose 50% Injectable 25 Gram(s) IV Push once  glucagon  Injectable 1 milliGRAM(s) IntraMuscular once  heparin   Injectable 5000 Unit(s) SubCutaneous every 12 hours  insulin glargine Injectable (LANTUS) 10 Unit(s) SubCutaneous at bedtime  insulin glargine Injectable (LANTUS) 10 Unit(s) SubCutaneous every morning  insulin lispro (ADMELOG) corrective regimen sliding scale   SubCutaneous at bedtime  insulin lispro (ADMELOG) corrective regimen sliding scale   SubCutaneous three times a day before meals  lacosamide IVPB 150 milliGRAM(s) IV Intermittent every 12 hours  levETIRAcetam  IVPB 500 milliGRAM(s) IV Intermittent every 12 hours  nystatin Powder 1 Application(s) Topical two times a day  piperacillin/tazobactam IVPB.. 3.375 Gram(s) IV Intermittent every 8 hours  valproate sodium   IVPB 500 milliGRAM(s) IV Intermittent three times a day    MEDICATIONS  (PRN):       CHIEF COMPLAINT: still sleepy  +SOB  + cough  no fever  no diarrhea  no active gross bleeding reported       PHYSICAL EXAM:    GENERAL: Moderately built, no acute distress   CHEST/LUNG:  No wheezing, + crackles bilaterally   HEART: Regular rate and rhythm; +SM  ABDOMEN: Soft, Nontender, Nondistended; Bowel sounds present  EXTREMITIES:  No clubbing, cyanosis, or edema  Psychiatry: sleepy but easily arousable. followed some simple commands.       OBJECTIVE DATA:   Vital Signs Last 24 Hrs  T(C): 36.7 (08 Nov 2024 05:33), Max: 38 (07 Nov 2024 13:24)  T(F): 98 (08 Nov 2024 05:33), Max: 100.4 (07 Nov 2024 13:24)  HR: 68 (08 Nov 2024 05:33) (64 - 104)  BP: 112/68 (08 Nov 2024 05:33) (98/61 - 132/71)  BP(mean): --  RR: 18 (08 Nov 2024 05:33) (12 - 20)  SpO2: 99% (08 Nov 2024 05:33) (99% - 100%)    Parameters below as of 08 Nov 2024 05:33  Patient On (Oxygen Delivery Method): nasal cannula  O2 Flow (L/min): 3             Daily Height in cm: 182.88 (07 Nov 2024 12:54)    LABS:                        9.4    9.80  )-----------( 236      ( 08 Nov 2024 07:16 )             30.4             11-08    147[H]  |  110[H]  |  51[H]  ----------------------------<  150[H]  3.9   |  32[H]  |  1.35[H]    Ca    8.5      08 Nov 2024 07:16    TPro  7.0  /  Alb  2.3[L]  /  TBili  0.5  /  DBili  x   /  AST  56[H]  /  ALT  31  /  AlkPhos  46  11-08              PT/INR - ( 07 Nov 2024 14:01 )   PT: 15.4 sec;   INR: 1.33 ratio         PTT - ( 07 Nov 2024 14:01 )  PTT:26.1 sec  Urinalysis Basic - ( 08 Nov 2024 07:16 )    Color: x / Appearance: x / SG: x / pH: x  Gluc: 150 mg/dL / Ketone: x  / Bili: x / Urobili: x   Blood: x / Protein: x / Nitrite: x   Leuk Esterase: x / RBC: x / WBC x   Sq Epi: x / Non Sq Epi: x / Bacteria: x            CAPILLARY BLOOD GLUCOSE      POCT Blood Glucose.: 107 mg/dL (08 Nov 2024 06:19)         Interval Radiology studies: reviewed by me  < from: CT Abdomen and Pelvis No Cont (11.07.24 @ 16:40) >  IMPRESSION:  Lowerlobe bronchial wall thickening with endobronchial debris and   superimposed unless opacities, suspicious for infection including   aspiration pneumonia in the appropriate clinical setting.    There is mild wall thickening of the rectosigmoid colon, which could be   due to underdistention or a superimposed proctocolitis.    No additional infectious process identified within t    < end of copied text >      MEDICATIONS  (STANDING):  azithromycin  IVPB 500 milliGRAM(s) IV Intermittent every 24 hours  dextrose 5%. 1000 milliLiter(s) (100 mL/Hr) IV Continuous <Continuous>  dextrose 5%. 1000 milliLiter(s) (75 mL/Hr) IV Continuous <Continuous>  dextrose 50% Injectable 25 Gram(s) IV Push once  glucagon  Injectable 1 milliGRAM(s) IntraMuscular once  heparin   Injectable 5000 Unit(s) SubCutaneous every 12 hours  insulin glargine Injectable (LANTUS) 10 Unit(s) SubCutaneous at bedtime  insulin glargine Injectable (LANTUS) 10 Unit(s) SubCutaneous every morning  insulin lispro (ADMELOG) corrective regimen sliding scale   SubCutaneous at bedtime  insulin lispro (ADMELOG) corrective regimen sliding scale   SubCutaneous three times a day before meals  lacosamide IVPB 150 milliGRAM(s) IV Intermittent every 12 hours  levETIRAcetam  IVPB 500 milliGRAM(s) IV Intermittent every 12 hours  nystatin Powder 1 Application(s) Topical two times a day  piperacillin/tazobactam IVPB.. 3.375 Gram(s) IV Intermittent every 8 hours  valproate sodium   IVPB 500 milliGRAM(s) IV Intermittent three times a day    MEDICATIONS  (PRN):

## 2024-11-08 NOTE — SWALLOW BEDSIDE ASSESSMENT ADULT - COMMENTS
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 Subsegmental atelectasis at the left lung base with no infiltrates, pleural effusions or CHF identified.  CT chest Lower lobe bronchial wall thickening with endobronchial debris and superimposed unless opacities, suspicious for infection including   aspiration pneumonia in the appropriate clinical setting.  H&P note ient was discharged from [facility] 5 days ago after being treated for influenza, and then superimposed pneumonia. Patient was on mechanical ventilator. Since the patient got home, he has been lethargic, less responsive, and eating less. Patient can sometimes answer yes or no but has been less responsive. Patient also had cough, vomiting, diarrhea, foul-smelling urine. 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  CT chest Lower lobe bronchial wall thickening with endobronchial debris and superimposed unless opacities, suspicious for infection including   aspiration pneumonia in the appropriate clinical setting.  H&P note Patient was discharged from [facility] 5 days ago after being treated for influenza and then superimposed pneumonia. Patient was on mechanical ventilator. Since the patient got home, he has been lethargic, less responsive, and eating less. Patient also had cough, vomiting, diarrhea, foul-smelling urine. there were no differences in airway protection noted between thin and thick liquids extraneous oral movements with bolus ; needed cues to move back to swallow; was able to clear with thin liquids without difficulty PMHx of Seizures, CVA w/ residual L deficits - bedbound, sacral decubitus ulcer, HTN, HLD, DM;  At baseline, patient can respond to simple questions and has good appetite per H&P   11/7 CXR  Shallow inspiration with low lung volumes and the patient is rotated to the left;  Subsegmental atelectasis at the left lung base with no infiltrates, pleural effusions or CHF identified.  CT chest Lower lobe bronchial wall thickening with endobronchial debris and superimposed unless opacities, suspicious for infection including aspiration pneumonia in the appropriate clinical setting.  H&P note Patient was discharged from [facility] 5 days ago after being treated for influenza and then superimposed pneumonia. Patient was on mechanical ventilator. Since the patient got home, he has been lethargic, less responsive, and eating less. Patient also had cough, vomiting, diarrhea, foul-smelling urine.

## 2024-11-08 NOTE — SWALLOW BEDSIDE ASSESSMENT ADULT - SWALLOW EVAL: DIAGNOSIS
Oropharyngeal dysphagia in setting of sepsis and acute metabolic encephalopathy;  there are no airway protection deficits during pharyngeal swallows on this exam Oropharyngeal dysphagia with hx stroke in setting of sepsis and acute metabolic encephalopathy;  there are no airway protection deficits during pharyngeal swallows on this exam

## 2024-11-08 NOTE — DIETITIAN INITIAL EVALUATION ADULT - ETIOLOGY
Inadequate energy/protein intake related to influenza, PNA, sepsis, recent hospitalizations (2) c intubation on vent

## 2024-11-08 NOTE — SWALLOW BEDSIDE ASSESSMENT ADULT - ADDITIONAL RECOMMENDATIONS
ALLOW EXTRA TIME BETWEEN SWALLOWS; ALTERNATE FOODS WITH LIQUIDS ; ENCOURAGE ONE ITEM IN MOUTH AT AT A TIME ; STOP FEEDING IF S/S DIFFICULTY   GOALS: Pt will maintain adequate airway protection for swallow given least restrictive diet textures ; Pt/family will follow swallow strategies ; therapy at next level of care

## 2024-11-08 NOTE — SWALLOW BEDSIDE ASSESSMENT ADULT - MODE OF PRESENTATION
cup/straw/fed by clinician cup/spoon/straw/fed by clinician completed 4 ounce portion/spoon/fed by clinician

## 2024-11-08 NOTE — SWALLOW BEDSIDE ASSESSMENT ADULT - MUCOSAL QUALITY
minimal congested cough at baseline per RN; however this was only heard x 1 during exam minimal congested cough at baseline per RN; however cough was only heard x 1 during exam

## 2024-11-08 NOTE — DIETITIAN INITIAL EVALUATION ADULT - PERTINENT LABORATORY DATA
11-08    147[H]  |  110[H]  |  51[H]  ----------------------------<  150[H]  3.9   |  32[H]  |  1.35[H]    Ca    8.5      08 Nov 2024 07:16    TPro  7.0  /  Alb  2.3[L]  /  TBili  0.5  /  DBili  x   /  AST  56[H]  /  ALT  31  /  AlkPhos  46  11-08  POCT Blood Glucose.: 107 mg/dL (11-08-24)

## 2024-11-08 NOTE — SWALLOW BEDSIDE ASSESSMENT ADULT - SLP GENERAL OBSERVATIONS
alert and stated his name; followed few directions but did willingly open mouth and accept all po trials ;  nodded his head when asked if thirsty alert and stated his name; followed few directions but did willingly open mouth and accept all po trials ;  nodded his head when asked if thirsty; paucity of verbal output

## 2024-11-08 NOTE — DIETITIAN INITIAL EVALUATION ADULT - NSFNSPHYEXAMSKINFT_GEN_A_CORE
Pressure Injury 1: Right:, second toe, Suspected deep tissue injury  Pressure Injury 2: Right:, third toe, Suspected deep tissue injury  Pressure Injury 3: Left:, Buttock, Stage II

## 2024-11-08 NOTE — SWALLOW BEDSIDE ASSESSMENT ADULT - SLP PERTINENT HISTORY OF CURRENT PROBLEM
Sepsis; Aspiration Pna;  acute metabolic encephalopathy.generalized weakness and lethargy for 5 days. Per Sepsis; Aspiration Pna; acute metabolic encephalopathy; admitted with generalized weakness and lethargy for 5 day Sepsis; Aspiration Pna; acute metabolic encephalopathy; admitted with generalized weakness and lethargy for 5 days

## 2024-11-08 NOTE — SWALLOW BEDSIDE ASSESSMENT ADULT - SWALLOW EVAL: PATIENT/FAMILY GOALS STATEMENT
Pt is verbal and repeatedly responded " Catalino Paige" to different questions;  imitated single words " orange, monday, one two three, ok, gimme more"

## 2024-11-08 NOTE — SWALLOW BEDSIDE ASSESSMENT ADULT - ORAL PREPARATORY PHASE
pt trunk turned towards right side/Lateral loss of bolus/Bolus falls into right lateral sulci Bolus falls into left lateral sulci Bolus falls into right lateral sulci

## 2024-11-08 NOTE — DIETITIAN INITIAL EVALUATION ADULT - NS FNS DIET ORDER
Speech Therapy    Patient not seen in therapy today.    Unavailable due to sleeping soundly/unarousable.      Re-attempt plan: tomorrow      OBJECTIVE                       Therapy procedure time and total treatment time can be found documented on the Time Entry flowsheet   Diet, NPO (11-07-24 @ 21:08)

## 2024-11-08 NOTE — DIETITIAN INITIAL EVALUATION ADULT - OTHER INFO
Unable to interview pt due to AMS. lethargy. Per medical record pt has 2 supportive daughters; no other living information reflected in medical record at this time. Family reports pt usually c good appetite. Pt is minimally verbal (c garbled speech; hx of CVA c left-side deficits), bedbound, confused & disoriented. Pt takes Novolg x 2/day to control BG levels at home.  Pt presented c generalized weakness, lethargy, cough x 5 days PTA. Pt was recently at outside hospital for flu & PNA; while there was intubated on mechanical vent; d/c 5 days ago but remained lethargic c decreased responsiveness & poor PO intake. Chest CT suspicious for infection including aspiration PNA; pt NPO pending swallow eval; pt also c sepsis & GUY. No reports of any N/V/C/D.

## 2024-11-08 NOTE — SWALLOW BEDSIDE ASSESSMENT ADULT - SWALLOW EVAL: RECOMMENDED DIET
PUREE TEXTURE WITH THIN LIQUIDS ;  PILLS WITH APPLESAUCE;  SINGLE SIPS OF THIN LIQUIDS BY CUP OR STRAW

## 2024-11-08 NOTE — SWALLOW BEDSIDE ASSESSMENT ADULT - NS SPL SWALLOW CLINIC TRIAL FT
pt was able to trigger second swallow on cues ; there were no changes in breathing nor vocal quality post swallows including via single sip straw drinking

## 2024-11-09 LAB
ANION GAP SERPL CALC-SCNC: 1 MMOL/L — LOW (ref 5–17)
BUN SERPL-MCNC: 32 MG/DL — HIGH (ref 7–23)
CALCIUM SERPL-MCNC: 8 MG/DL — LOW (ref 8.5–10.1)
CHLORIDE SERPL-SCNC: 110 MMOL/L — HIGH (ref 96–108)
CO2 SERPL-SCNC: 33 MMOL/L — HIGH (ref 22–31)
CREAT SERPL-MCNC: 0.95 MG/DL — SIGNIFICANT CHANGE UP (ref 0.5–1.3)
EGFR: 79 ML/MIN/1.73M2 — SIGNIFICANT CHANGE UP
GLUCOSE BLDC GLUCOMTR-MCNC: 121 MG/DL — HIGH (ref 70–99)
GLUCOSE BLDC GLUCOMTR-MCNC: 128 MG/DL — HIGH (ref 70–99)
GLUCOSE BLDC GLUCOMTR-MCNC: 148 MG/DL — HIGH (ref 70–99)
GLUCOSE BLDC GLUCOMTR-MCNC: 84 MG/DL — SIGNIFICANT CHANGE UP (ref 70–99)
GLUCOSE SERPL-MCNC: 81 MG/DL — SIGNIFICANT CHANGE UP (ref 70–99)
HCT VFR BLD CALC: 29.2 % — LOW (ref 39–50)
HGB BLD-MCNC: 9.4 G/DL — LOW (ref 13–17)
MCHC RBC-ENTMCNC: 32.2 G/DL — SIGNIFICANT CHANGE UP (ref 32–36)
MCHC RBC-ENTMCNC: 32.2 PG — SIGNIFICANT CHANGE UP (ref 27–34)
MCV RBC AUTO: 100 FL — SIGNIFICANT CHANGE UP (ref 80–100)
NRBC # BLD: 0 /100 WBCS — SIGNIFICANT CHANGE UP (ref 0–0)
PLATELET # BLD AUTO: 240 K/UL — SIGNIFICANT CHANGE UP (ref 150–400)
POTASSIUM SERPL-MCNC: 3.9 MMOL/L — SIGNIFICANT CHANGE UP (ref 3.5–5.3)
POTASSIUM SERPL-SCNC: 3.9 MMOL/L — SIGNIFICANT CHANGE UP (ref 3.5–5.3)
PROCALCITONIN SERPL-MCNC: 0.18 NG/ML — HIGH (ref 0.02–0.1)
RBC # BLD: 2.92 M/UL — LOW (ref 4.2–5.8)
RBC # FLD: 12 % — SIGNIFICANT CHANGE UP (ref 10.3–14.5)
SODIUM SERPL-SCNC: 144 MMOL/L — SIGNIFICANT CHANGE UP (ref 135–145)
WBC # BLD: 9.97 K/UL — SIGNIFICANT CHANGE UP (ref 3.8–10.5)
WBC # FLD AUTO: 9.97 K/UL — SIGNIFICANT CHANGE UP (ref 3.8–10.5)

## 2024-11-09 PROCEDURE — 99232 SBSQ HOSP IP/OBS MODERATE 35: CPT

## 2024-11-09 RX ORDER — INSULIN GLARGINE,HUM.REC.ANLOG 100/ML
7 VIAL (ML) SUBCUTANEOUS EVERY MORNING
Refills: 0 | Status: DISCONTINUED | OUTPATIENT
Start: 2024-11-09 | End: 2024-11-12

## 2024-11-09 RX ORDER — VALPROIC ACID 250 MG/5ML
500 SOLUTION ORAL EVERY 8 HOURS
Refills: 0 | Status: DISCONTINUED | OUTPATIENT
Start: 2024-11-09 | End: 2024-11-12

## 2024-11-09 RX ORDER — LACOSAMIDE 100 MG/1
200 TABLET ORAL
Refills: 0 | Status: DISCONTINUED | OUTPATIENT
Start: 2024-11-09 | End: 2024-11-11

## 2024-11-09 RX ORDER — LEVETIRACETAM 500 MG/1
1000 TABLET, FILM COATED ORAL
Refills: 0 | Status: DISCONTINUED | OUTPATIENT
Start: 2024-11-09 | End: 2024-11-12

## 2024-11-09 RX ORDER — INSULIN GLARGINE,HUM.REC.ANLOG 100/ML
7 VIAL (ML) SUBCUTANEOUS AT BEDTIME
Refills: 0 | Status: DISCONTINUED | OUTPATIENT
Start: 2024-11-09 | End: 2024-11-12

## 2024-11-09 RX ORDER — DIVALPROEX SODIUM 250 MG/1
500 TABLET, FILM COATED, EXTENDED RELEASE ORAL EVERY 8 HOURS
Refills: 0 | Status: DISCONTINUED | OUTPATIENT
Start: 2024-11-09 | End: 2024-11-09

## 2024-11-09 RX ORDER — AZITHROMYCIN DIHYDRATE 200 MG/5ML
500 POWDER, FOR SUSPENSION ORAL DAILY
Refills: 0 | Status: DISCONTINUED | OUTPATIENT
Start: 2024-11-09 | End: 2024-11-09

## 2024-11-09 RX ORDER — LACOSAMIDE 100 MG/1
200 TABLET ORAL
Refills: 0 | Status: DISCONTINUED | OUTPATIENT
Start: 2024-11-09 | End: 2024-11-09

## 2024-11-09 RX ADMIN — NYSTATIN 1 APPLICATION(S): 100000 POWDER TOPICAL at 05:02

## 2024-11-09 RX ADMIN — PIPERACILLIN AND TAZOBACTAM 25 GRAM(S): .5; 4 INJECTION, POWDER, LYOPHILIZED, FOR SOLUTION INTRAVENOUS at 21:22

## 2024-11-09 RX ADMIN — PIPERACILLIN AND TAZOBACTAM 25 GRAM(S): .5; 4 INJECTION, POWDER, LYOPHILIZED, FOR SOLUTION INTRAVENOUS at 05:31

## 2024-11-09 RX ADMIN — LEVETIRACETAM 400 MILLIGRAM(S): 500 TABLET, FILM COATED ORAL at 05:01

## 2024-11-09 RX ADMIN — Medication 2 TABLET(S): at 21:22

## 2024-11-09 RX ADMIN — VALPROIC ACID 55 MILLIGRAM(S): 250 SOLUTION ORAL at 05:28

## 2024-11-09 RX ADMIN — VALPROIC ACID 500 MILLIGRAM(S): 250 SOLUTION ORAL at 21:22

## 2024-11-09 RX ADMIN — Medication 7 UNIT(S): at 21:33

## 2024-11-09 RX ADMIN — Medication 7 UNIT(S): at 12:28

## 2024-11-09 RX ADMIN — GUAIFENESIN 100 MILLIGRAM(S): 100 LIQUID ORAL at 05:28

## 2024-11-09 RX ADMIN — NYSTATIN 1 APPLICATION(S): 100000 POWDER TOPICAL at 18:05

## 2024-11-09 RX ADMIN — PIPERACILLIN AND TAZOBACTAM 25 GRAM(S): .5; 4 INJECTION, POWDER, LYOPHILIZED, FOR SOLUTION INTRAVENOUS at 13:29

## 2024-11-09 RX ADMIN — HEPARIN SODIUM 5000 UNIT(S): 10000 INJECTION INTRAVENOUS; SUBCUTANEOUS at 05:02

## 2024-11-09 RX ADMIN — LACOSAMIDE 200 MILLIGRAM(S): 100 TABLET ORAL at 18:04

## 2024-11-09 RX ADMIN — HEPARIN SODIUM 5000 UNIT(S): 10000 INJECTION INTRAVENOUS; SUBCUTANEOUS at 18:04

## 2024-11-09 RX ADMIN — LEVETIRACETAM 1000 MILLIGRAM(S): 500 TABLET, FILM COATED ORAL at 18:04

## 2024-11-09 RX ADMIN — VALPROIC ACID 500 MILLIGRAM(S): 250 SOLUTION ORAL at 16:35

## 2024-11-09 NOTE — PROGRESS NOTE ADULT - SUBJECTIVE AND OBJECTIVE BOX
CHIEF COMPLAINT: more alert, conversant   +SOB  no fever  no diarrhea  no active gross bleeding reported       PHYSICAL EXAM:    GENERAL: Moderately built, no acute distress   CHEST/LUNG:  No wheezing, + crackles bilaterally   HEART: Regular rate and rhythm; +SM  ABDOMEN: Soft, Nontender, Nondistended; Bowel sounds present  EXTREMITIES:  No clubbing, cyanosis, or edema  Psychiatry: more alert and talkative. still has confusion.       OBJECTIVE DATA:     Vital Signs Last 24 Hrs  T(C): 37 (09 Nov 2024 04:49), Max: 37 (09 Nov 2024 04:49)  T(F): 98.6 (09 Nov 2024 04:49), Max: 98.6 (09 Nov 2024 04:49)  HR: 75 (09 Nov 2024 04:49) (66 - 85)  BP: 115/71 (09 Nov 2024 04:49) (111/74 - 118/74)  BP(mean): --  RR: 20 (09 Nov 2024 04:49) (16 - 20)  SpO2: 98% (09 Nov 2024 04:49) (97% - 100%)    Parameters below as of 09 Nov 2024 04:49  Patient On (Oxygen Delivery Method): nasal cannula  O2 Flow (L/min): 3             Daily     Daily   LABS:                        9.4    9.97  )-----------( 240      ( 09 Nov 2024 07:07 )             29.2             11-09    144  |  110[H]  |  32[H]  ----------------------------<  81  3.9   |  33[H]  |  0.95    Ca    8.0[L]      09 Nov 2024 07:07    TPro  7.0  /  Alb  2.3[L]  /  TBili  0.5  /  DBili  x   /  AST  56[H]  /  ALT  31  /  AlkPhos  46  11-08              PT/INR - ( 07 Nov 2024 14:01 )   PT: 15.4 sec;   INR: 1.33 ratio         PTT - ( 07 Nov 2024 14:01 )  PTT:26.1 sec  Urinalysis Basic - ( 09 Nov 2024 07:07 )    Color: x / Appearance: x / SG: x / pH: x  Gluc: 81 mg/dL / Ketone: x  / Bili: x / Urobili: x   Blood: x / Protein: x / Nitrite: x   Leuk Esterase: x / RBC: x / WBC x   Sq Epi: x / Non Sq Epi: x / Bacteria: x           CAPILLARY BLOOD GLUCOSE      POCT Blood Glucose.: 84 mg/dL (09 Nov 2024 08:45)      Culture - Blood (collected 11-07)  Source: .Blood BLOOD  Preliminary Report (11-08):    No growth at 24 hours    Culture - Blood (collected 11-07)  Source: .Blood BLOOD  Preliminary Report (11-08):    No growth at 24 hours         MEDICATIONS  (STANDING):  azithromycin   Tablet 500 milliGRAM(s) Oral daily  dextrose 5%. 1000 milliLiter(s) (100 mL/Hr) IV Continuous <Continuous>  dextrose 50% Injectable 25 Gram(s) IV Push once  glucagon  Injectable 1 milliGRAM(s) IntraMuscular once  heparin   Injectable 5000 Unit(s) SubCutaneous every 12 hours  insulin glargine Injectable (LANTUS) 7 Unit(s) SubCutaneous at bedtime  insulin glargine Injectable (LANTUS) 7 Unit(s) SubCutaneous every morning  insulin lispro (ADMELOG) corrective regimen sliding scale   SubCutaneous at bedtime  insulin lispro (ADMELOG) corrective regimen sliding scale   SubCutaneous three times a day before meals  lacosamide IVPB 150 milliGRAM(s) IV Intermittent every 12 hours  levETIRAcetam  IVPB 500 milliGRAM(s) IV Intermittent every 12 hours  nystatin Powder 1 Application(s) Topical two times a day  piperacillin/tazobactam IVPB.. 3.375 Gram(s) IV Intermittent every 8 hours  senna 2 Tablet(s) Oral at bedtime  valproate sodium   IVPB 500 milliGRAM(s) IV Intermittent three times a day    MEDICATIONS  (PRN):  guaiFENesin Oral Liquid (Sugar-Free) 100 milliGRAM(s) Oral every 6 hours PRN Cough

## 2024-11-10 LAB
FOLATE SERPL-MCNC: 19.4 NG/ML — SIGNIFICANT CHANGE UP
GLUCOSE BLDC GLUCOMTR-MCNC: 101 MG/DL — HIGH (ref 70–99)
GLUCOSE BLDC GLUCOMTR-MCNC: 105 MG/DL — HIGH (ref 70–99)
GLUCOSE BLDC GLUCOMTR-MCNC: 114 MG/DL — HIGH (ref 70–99)
GLUCOSE BLDC GLUCOMTR-MCNC: 116 MG/DL — HIGH (ref 70–99)
PROCALCITONIN SERPL-MCNC: 0.12 NG/ML — HIGH (ref 0.02–0.1)
VIT B12 SERPL-MCNC: 1841 PG/ML — HIGH (ref 232–1245)

## 2024-11-10 PROCEDURE — 99232 SBSQ HOSP IP/OBS MODERATE 35: CPT

## 2024-11-10 RX ADMIN — LEVETIRACETAM 1000 MILLIGRAM(S): 500 TABLET, FILM COATED ORAL at 17:58

## 2024-11-10 RX ADMIN — LACOSAMIDE 200 MILLIGRAM(S): 100 TABLET ORAL at 05:53

## 2024-11-10 RX ADMIN — PIPERACILLIN AND TAZOBACTAM 25 GRAM(S): .5; 4 INJECTION, POWDER, LYOPHILIZED, FOR SOLUTION INTRAVENOUS at 13:41

## 2024-11-10 RX ADMIN — HEPARIN SODIUM 5000 UNIT(S): 10000 INJECTION INTRAVENOUS; SUBCUTANEOUS at 17:58

## 2024-11-10 RX ADMIN — LACOSAMIDE 200 MILLIGRAM(S): 100 TABLET ORAL at 17:58

## 2024-11-10 RX ADMIN — PIPERACILLIN AND TAZOBACTAM 25 GRAM(S): .5; 4 INJECTION, POWDER, LYOPHILIZED, FOR SOLUTION INTRAVENOUS at 05:52

## 2024-11-10 RX ADMIN — NYSTATIN 1 APPLICATION(S): 100000 POWDER TOPICAL at 05:51

## 2024-11-10 RX ADMIN — NYSTATIN 1 APPLICATION(S): 100000 POWDER TOPICAL at 17:58

## 2024-11-10 RX ADMIN — VALPROIC ACID 500 MILLIGRAM(S): 250 SOLUTION ORAL at 05:52

## 2024-11-10 RX ADMIN — VALPROIC ACID 500 MILLIGRAM(S): 250 SOLUTION ORAL at 13:41

## 2024-11-10 RX ADMIN — VALPROIC ACID 500 MILLIGRAM(S): 250 SOLUTION ORAL at 21:53

## 2024-11-10 RX ADMIN — LEVETIRACETAM 1000 MILLIGRAM(S): 500 TABLET, FILM COATED ORAL at 05:53

## 2024-11-10 RX ADMIN — PIPERACILLIN AND TAZOBACTAM 25 GRAM(S): .5; 4 INJECTION, POWDER, LYOPHILIZED, FOR SOLUTION INTRAVENOUS at 21:52

## 2024-11-10 RX ADMIN — Medication 7 UNIT(S): at 08:35

## 2024-11-10 RX ADMIN — HEPARIN SODIUM 5000 UNIT(S): 10000 INJECTION INTRAVENOUS; SUBCUTANEOUS at 05:53

## 2024-11-10 RX ADMIN — Medication 2 TABLET(S): at 21:53

## 2024-11-10 NOTE — PROGRESS NOTE ADULT - SUBJECTIVE AND OBJECTIVE BOX
CHIEF COMPLAINT:  +SOB improving   no fever  no diarrhea  no active gross bleeding reported       PHYSICAL EXAM:    GENERAL: Moderately built, no acute distress   CHEST/LUNG:  No wheezing, + crackles less as compared to yesterday  HEART: Regular rate and rhythm; +SM  ABDOMEN: Soft, Nontender, Nondistended; Bowel sounds present  EXTREMITIES:  No clubbing, cyanosis, or edema  Psychiatry: more alert and talkative. still has confusion.       OBJECTIVE DATA:     Vital Signs Last 24 Hrs  T(C): 36.3 (10 Nov 2024 10:34), Max: 37.1 (10 Nov 2024 06:38)  T(F): 97.4 (10 Nov 2024 10:34), Max: 98.7 (10 Nov 2024 06:38)  HR: 84 (10 Nov 2024 10:34) (74 - 87)  BP: 137/82 (10 Nov 2024 10:34) (127/70 - 137/82)  BP(mean): --  RR: 16 (10 Nov 2024 10:34) (14 - 19)  SpO2: 99% (10 Nov 2024 10:34) (98% - 100%)    Parameters below as of 2024 17:37  Patient On (Oxygen Delivery Method): nasal cannula               Daily     Daily Weight in k.4 (10 Nov 2024 06:38)  LABS:                        9.4    9.97  )-----------( 240      ( 2024 07:07 )             29.2                 144  |  110[H]  |  32[H]  ----------------------------<  81  3.9   |  33[H]  |  0.95    Ca    8.0[L]      2024 07:07                  Urinalysis Basic - ( 2024 07:07 )    Color: x / Appearance: x / SG: x / pH: x  Gluc: 81 mg/dL / Ketone: x  / Bili: x / Urobili: x   Blood: x / Protein: x / Nitrite: x   Leuk Esterase: x / RBC: x / WBC x   Sq Epi: x / Non Sq Epi: x / Bacteria: x           CAPILLARY BLOOD GLUCOSE      POCT Blood Glucose.: 116 mg/dL (10 Nov 2024 07:40)      Culture - Blood (collected )  Source: .Blood BLOOD  Preliminary Report ():    No growth at 48 Hours    Culture - Blood (collected )  Source: .Blood BLOOD  Preliminary Report ():    No growth at 48 Hours      MEDICATIONS  (STANDING):  dextrose 5%. 1000 milliLiter(s) (100 mL/Hr) IV Continuous <Continuous>  dextrose 50% Injectable 25 Gram(s) IV Push once  glucagon  Injectable 1 milliGRAM(s) IntraMuscular once  heparin   Injectable 5000 Unit(s) SubCutaneous every 12 hours  insulin glargine Injectable (LANTUS) 7 Unit(s) SubCutaneous at bedtime  insulin glargine Injectable (LANTUS) 7 Unit(s) SubCutaneous every morning  insulin lispro (ADMELOG) corrective regimen sliding scale   SubCutaneous at bedtime  insulin lispro (ADMELOG) corrective regimen sliding scale   SubCutaneous three times a day before meals  lacosamide Solution 200 milliGRAM(s) Oral two times a day  levETIRAcetam 1000 milliGRAM(s) Oral two times a day  nystatin Powder 1 Application(s) Topical two times a day  piperacillin/tazobactam IVPB.. 3.375 Gram(s) IV Intermittent every 8 hours  senna 2 Tablet(s) Oral at bedtime  valproic  acid Syrup 500 milliGRAM(s) Oral every 8 hours    MEDICATIONS  (PRN):  guaiFENesin Oral Liquid (Sugar-Free) 100 milliGRAM(s) Oral every 6 hours PRN Cough

## 2024-11-11 LAB
GLUCOSE BLDC GLUCOMTR-MCNC: 117 MG/DL — HIGH (ref 70–99)
GLUCOSE BLDC GLUCOMTR-MCNC: 221 MG/DL — HIGH (ref 70–99)
GLUCOSE BLDC GLUCOMTR-MCNC: 89 MG/DL — SIGNIFICANT CHANGE UP (ref 70–99)
GLUCOSE BLDC GLUCOMTR-MCNC: 90 MG/DL — SIGNIFICANT CHANGE UP (ref 70–99)

## 2024-11-11 PROCEDURE — 99232 SBSQ HOSP IP/OBS MODERATE 35: CPT

## 2024-11-11 RX ORDER — LACOSAMIDE 100 MG/1
200 TABLET ORAL EVERY 12 HOURS
Refills: 0 | Status: DISCONTINUED | OUTPATIENT
Start: 2024-11-11 | End: 2024-11-12

## 2024-11-11 RX ADMIN — Medication 7 UNIT(S): at 22:32

## 2024-11-11 RX ADMIN — Medication 2 TABLET(S): at 22:31

## 2024-11-11 RX ADMIN — NYSTATIN 1 APPLICATION(S): 100000 POWDER TOPICAL at 18:16

## 2024-11-11 RX ADMIN — VALPROIC ACID 500 MILLIGRAM(S): 250 SOLUTION ORAL at 22:31

## 2024-11-11 RX ADMIN — HEPARIN SODIUM 5000 UNIT(S): 10000 INJECTION INTRAVENOUS; SUBCUTANEOUS at 05:19

## 2024-11-11 RX ADMIN — LEVETIRACETAM 1000 MILLIGRAM(S): 500 TABLET, FILM COATED ORAL at 05:19

## 2024-11-11 RX ADMIN — VALPROIC ACID 500 MILLIGRAM(S): 250 SOLUTION ORAL at 05:19

## 2024-11-11 RX ADMIN — PIPERACILLIN AND TAZOBACTAM 25 GRAM(S): .5; 4 INJECTION, POWDER, LYOPHILIZED, FOR SOLUTION INTRAVENOUS at 22:32

## 2024-11-11 RX ADMIN — PIPERACILLIN AND TAZOBACTAM 25 GRAM(S): .5; 4 INJECTION, POWDER, LYOPHILIZED, FOR SOLUTION INTRAVENOUS at 15:25

## 2024-11-11 RX ADMIN — LACOSAMIDE 140 MILLIGRAM(S): 100 TABLET ORAL at 19:10

## 2024-11-11 RX ADMIN — HEPARIN SODIUM 5000 UNIT(S): 10000 INJECTION INTRAVENOUS; SUBCUTANEOUS at 18:15

## 2024-11-11 RX ADMIN — LACOSAMIDE 200 MILLIGRAM(S): 100 TABLET ORAL at 05:20

## 2024-11-11 RX ADMIN — PIPERACILLIN AND TAZOBACTAM 25 GRAM(S): .5; 4 INJECTION, POWDER, LYOPHILIZED, FOR SOLUTION INTRAVENOUS at 05:20

## 2024-11-11 RX ADMIN — VALPROIC ACID 500 MILLIGRAM(S): 250 SOLUTION ORAL at 15:24

## 2024-11-11 RX ADMIN — NYSTATIN 1 APPLICATION(S): 100000 POWDER TOPICAL at 05:20

## 2024-11-11 RX ADMIN — LEVETIRACETAM 1000 MILLIGRAM(S): 500 TABLET, FILM COATED ORAL at 18:28

## 2024-11-11 NOTE — PROGRESS NOTE ADULT - NUTRITIONAL ASSESSMENT
This patient has been assessed with a concern for Malnutrition and has been determined to have a diagnosis/diagnoses of Moderate protein-calorie malnutrition.    This patient is being managed with:   Diet Pureed-  Consistent Carbohydrate {Evening Snack}  DASH/TLC {Sodium & Cholesterol Restricted}  Supplement Feeding Modality:  Oral  Ensure Plus High Protein Cans or Servings Per Day:  1       Frequency:  Three Times a day  Entered: Nov 11 2024 10:46AM  
This patient has been assessed with a concern for Malnutrition and has been determined to have a diagnosis/diagnoses of Moderate protein-calorie malnutrition.    This patient is being managed with:   Diet Pureed-  Consistent Carbohydrate {Evening Snack}  DASH/TLC {Sodium & Cholesterol Restricted}  Entered: Nov 8 2024  3:12PM  
This patient has been assessed with a concern for Malnutrition and has been determined to have a diagnosis/diagnoses of Moderate protein-calorie malnutrition.    This patient is being managed with:   Diet Pureed-  Consistent Carbohydrate {Evening Snack}  DASH/TLC {Sodium & Cholesterol Restricted}  Entered: Nov 8 2024  3:12PM

## 2024-11-11 NOTE — PROGRESS NOTE ADULT - SUBJECTIVE AND OBJECTIVE BOX
CHIEF COMPLAINT: SOB better  no report of any fever or n/v/d  no abd pain      PHYSICAL EXAM:    GENERAL: Moderately built, on NC oxygen  CHEST/LUNG:  No wheezing, no crackles   HEART: Regular rate and rhythm; No murmurs  ABDOMEN: Soft, Nontender, Nondistended; Bowel sounds present  EXTREMITIES:  No clubbing, cyanosis, or edema.      OBJECTIVE DATA:   Vital Signs Last 24 Hrs  T(C): 36.3 (2024 17:18), Max: 36.8 (10 Nov 2024 23:27)  T(F): 97.3 (2024 17:18), Max: 98.2 (10 Nov 2024 23:27)  HR: 91 (2024 17:18) (76 - 91)  BP: 151/64 (2024 17:18) (132/74 - 159/84)  BP(mean): --  RR: 18 (2024 17:18) (16 - 18)  SpO2: 99% (2024 17:18) (98% - 100%)    Parameters below as of 2024 17:18  Patient On (Oxygen Delivery Method): nasal cannula      Daily Weight in k.6 (2024 05:35)    CAPILLARY BLOOD GLUCOSE      POCT Blood Glucose.: 117 mg/dL (2024 16:19)      Culture - Blood  Source: .Blood BLOOD  Preliminary Report (11-10):    No growth at 72 Hours    Culture - Blood  Source: .Blood BLOOD  Preliminary Report (11-10):    No growth at 72 Hours        MEDICATIONS  (STANDING):  dextrose 5%. 1000 milliLiter(s) (100 mL/Hr) IV Continuous <Continuous>  dextrose 50% Injectable 25 Gram(s) IV Push once  glucagon  Injectable 1 milliGRAM(s) IntraMuscular once  heparin   Injectable 5000 Unit(s) SubCutaneous every 12 hours  insulin glargine Injectable (LANTUS) 7 Unit(s) SubCutaneous at bedtime  insulin glargine Injectable (LANTUS) 7 Unit(s) SubCutaneous every morning  insulin lispro (ADMELOG) corrective regimen sliding scale   SubCutaneous at bedtime  insulin lispro (ADMELOG) corrective regimen sliding scale   SubCutaneous three times a day before meals  lacosamide Solution 200 milliGRAM(s) Oral two times a day  levETIRAcetam 1000 milliGRAM(s) Oral two times a day  nystatin Powder 1 Application(s) Topical two times a day  piperacillin/tazobactam IVPB.. 3.375 Gram(s) IV Intermittent every 8 hours  senna 2 Tablet(s) Oral at bedtime  valproic  acid Syrup 500 milliGRAM(s) Oral every 8 hours    MEDICATIONS  (PRN):  guaiFENesin Oral Liquid (Sugar-Free) 100 milliGRAM(s) Oral every 6 hours PRN Cough

## 2024-11-11 NOTE — PROGRESS NOTE ADULT - ASSESSMENT
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 Admitted for sepsis      Problem/Plan - 1:  ·  Problem: Sepsis with acute metabolic encephalopathy with aspiration pneumonia.   Improving. change iv to oral zithromax  cont iv zosyn  Follow labs and clinical improvement.   patient passed swallow eval  cont pureed diet with aspiration precautions.     Macrocytic anemia. Pending vitamin B12 and folate. Follow CBC    Hypernatremia. Improved. Off IVF.     Problem/Plan - 3:  ·  Problem: Proctocolitis.   ·  Plan: - CT abd = There is mild wall thickening of the rectosigmoid colon, which could be due to underdistention or a superimposed proctocolitis.      Problem/Plan - 5:  ·  Problem: GUY (acute kidney injury). Pre-renal likely  ·  Plan: - P/w SCr 2.45  Improved.     Problem/Plan - 6:  ·  Problem: Seizure disorder.   ·  Plan: - Home meds lacosamide, levetiracetam, divalproex  Change iv to oral meds.     Problem/Plan - 7:  ·  Problem: DM (diabetes mellitus) type 2. At risk of hypoglycemia. Adjusted lantus. cont correctional insulin. Follow finger sticks.     Problem/Plan - 8:  ·  Problem: HTN (hypertension). controlled.   ·  Plan: - Home med Lisinopril 20mg, HCTZ 12.5mg, Metoprolol 25mg BID   - Hold home meds in the setting of sepsis. Monitor closely.     Problem/Plan - 9:  ·  Problem: HLD (hyperlipidemia).   ·  Plan: - Hold home med in the setting of sepsis.    Moderate PCM. cont supplements.     Problem/Plan - 10:  ·  Problem: Prophylactic measure.   ·  Plan; - Hep SC.      FC  Time spent by me managing the patient including but not limited to reviewing the chart, discussion with the IDR team (nurse//care manager/ACP), physical exam and assessment and plan is 37 mins   
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 Admitted for sepsis      Problem/Plan - 1:  ·  Problem: Sepsis with acute metabolic encephalopathy with aspiration pneumonia.   cont current iv antibiotics. aspiration precautions. check oxygen sat  check labs and procalcitonin.   NPO pending swallow eval.    Macrocytic anemia. check vitamin B12 and folate. Follow CBC    Hypernatremia. cont dextose IVF. Trend.     Problem/Plan - 3:  ·  Problem: Proctocolitis.   ·  Plan: - CT abd = There is mild wall thickening of the rectosigmoid colon, which could be due to underdistention or a superimposed proctocolitis.      Problem/Plan - 5:  ·  Problem: GUY (acute kidney injury). Pre-renal likely  ·  Plan: - P/w SCr 2.45  - Unknown recent baseline but in 2022 was ~1  - Avoid nephrotoxins.    Problem/Plan - 6:  ·  Problem: Seizure disorder.   ·  Plan: - Home meds lacosamide, levetiracetam, divalproex  - Continue as IV meds.   Titrate dose per GFR.     Problem/Plan - 7:  ·  Problem: DM (diabetes mellitus) type 2. cont lantus. follow finger sticks.     Problem/Plan - 8:  ·  Problem: HTN (hypertension). controlled.   ·  Plan: - Home med Lisinopril 20mg, HCTZ 12.5mg, Metoprolol 25mg BID   - Hold home meds in the setting of sepsis. Monitor closely.     Problem/Plan - 9:  ·  Problem: HLD (hyperlipidemia).   ·  Plan: - Hold home med in the setting of sepsis.    Problem/Plan - 10:  ·  Problem: Prophylactic measure.   ·  Plan; - Hep SC.      FC  Time spent by me managing the patient including but not limited to reviewing the chart, discussion with the IDR team (nurse//care manager/ACP), physical exam and assessment and plan is 47 mins   
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 Admitted for sepsis      Problem/Plan - 1:  ·  Problem: Sepsis with acute metabolic encephalopathy with aspiration pneumonia.   Off zithromax.   cont iv zosyn x 1 more day.   Follow labs and clinical improvement.   patient passed swallow eval  cont pureed diet with aspiration precautions.     Macrocytic anemia. Pending vitamin B12 and folate.     Hypernatremia. Improved. Off IVF.     Problem/Plan - 3:  ·  Problem: Proctocolitis.   ·  Plan: - CT abd = There is mild wall thickening of the rectosigmoid colon, which could be due to underdistention or a superimposed proctocolitis.      Problem/Plan - 5:  ·  Problem: GUY (acute kidney injury). Pre-renal likely  ·  Plan: - P/w SCr 2.45  Improved.     Problem/Plan - 6:  ·  Problem: Seizure disorder.   ·  Plan: - Home meds lacosamide, levetiracetam, divalproex  seizure precautions     Problem/Plan - 7:  ·  Problem: DM (diabetes mellitus) type 2. At risk of hypoglycemia. cont current dose of lantus. cont correctional insulin. Follow finger sticks.     Problem/Plan - 8:  ·  Problem: HTN (hypertension). controlled.   ·  Plan: - Home med Lisinopril 20mg, HCTZ 12.5mg, Metoprolol 25mg BID   - Hold home meds in the setting of sepsis. Monitor closely.     Problem/Plan - 9:  ·  Problem: HLD (hyperlipidemia).   ·  Plan: - Hold home med in the setting of sepsis.    Moderate PCM. cont supplements.     Problem/Plan - 10:  ·  Problem: Prophylactic measure.   ·  Plan; - Hep SC.      FC  Time spent by me managing the patient including but not limited to reviewing the chart, discussion with the IDR team (nurse//care manager/ACP), physical exam and assessment and plan is 36 mins   
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 Admitted for sepsis      Problem/Plan - 1:  ·  Problem: Sepsis with acute metabolic encephalopathy with aspiration pneumonia.   Finish iv zosyn course.  Follow labs and clinical improvement.   patient passed swallow eval  cont pureed diet with aspiration precautions.     Macrocytic anemia. Reviewed vitamin B12 and folate.     Hypernatremia. Improved. Off IVF.     Problem/Plan - 3:  ·  Problem: Proctocolitis.   ·  Plan: - CT abd = There is mild wall thickening of the rectosigmoid colon, which could be due to underdistention or a superimposed proctocolitis.      Problem/Plan - 5:  ·  Problem: GUY (acute kidney injury). Pre-renal likely  ·  Plan: - P/w SCr 2.45  Improved.     Problem/Plan - 6:  ·  Problem: Seizure disorder.   ·  Plan: - Home meds lacosamide, levetiracetam, divalproex  seizure precautions     Problem/Plan - 7:  ·  Problem: DM (diabetes mellitus) type 2. At risk of hypoglycemia. cont current dose of lantus. cont correctional insulin. Follow finger sticks.     Problem/Plan - 8:  ·  Problem: HTN (hypertension). controlled.   ·  Plan: - Home med Lisinopril 20mg, HCTZ 12.5mg, Metoprolol 25mg BID   - Hold home meds in the setting of sepsis. Monitor closely.     Problem/Plan - 9:  ·  Problem: HLD (hyperlipidemia).   ·  Plan: - Hold home med in the setting of sepsis.    Moderate PCM. cont supplements.     Problem/Plan - 10:  ·  Problem: Prophylactic measure.   ·  Plan; - Hep SC.      FC    Home with HHA tomorrow.     Time spent by me managing the patient including but not limited to reviewing the chart, discussion with the IDR team (nurse//care manager/ACP), physical exam and assessment and plan is 36 mins

## 2024-11-12 VITALS
SYSTOLIC BLOOD PRESSURE: 131 MMHG | HEART RATE: 92 BPM | OXYGEN SATURATION: 98 % | TEMPERATURE: 99 F | RESPIRATION RATE: 18 BRPM | DIASTOLIC BLOOD PRESSURE: 76 MMHG

## 2024-11-12 LAB
CULTURE RESULTS: SIGNIFICANT CHANGE UP
CULTURE RESULTS: SIGNIFICANT CHANGE UP
GLUCOSE BLDC GLUCOMTR-MCNC: 103 MG/DL — HIGH (ref 70–99)
GLUCOSE BLDC GLUCOMTR-MCNC: 112 MG/DL — HIGH (ref 70–99)
SPECIMEN SOURCE: SIGNIFICANT CHANGE UP
SPECIMEN SOURCE: SIGNIFICANT CHANGE UP

## 2024-11-12 PROCEDURE — 99239 HOSP IP/OBS DSCHRG MGMT >30: CPT

## 2024-11-12 RX ORDER — LEVOFLOXACIN 750 MG/1
1 TABLET, FILM COATED ORAL
Qty: 2 | Refills: 0
Start: 2024-11-12 | End: 2024-11-13

## 2024-11-12 RX ORDER — HYDROCHLOROTHIAZIDE 50 MG
1 TABLET ORAL
Refills: 0 | DISCHARGE

## 2024-11-12 RX ORDER — INSULIN NPH HUMAN ISOPHANE 100/ML (3)
15 INSULIN PEN (ML) SUBCUTANEOUS
Qty: 0 | Refills: 0 | DISCHARGE

## 2024-11-12 RX ADMIN — VALPROIC ACID 500 MILLIGRAM(S): 250 SOLUTION ORAL at 05:06

## 2024-11-12 RX ADMIN — LACOSAMIDE 140 MILLIGRAM(S): 100 TABLET ORAL at 05:59

## 2024-11-12 RX ADMIN — PIPERACILLIN AND TAZOBACTAM 25 GRAM(S): .5; 4 INJECTION, POWDER, LYOPHILIZED, FOR SOLUTION INTRAVENOUS at 05:05

## 2024-11-12 RX ADMIN — LEVETIRACETAM 1000 MILLIGRAM(S): 500 TABLET, FILM COATED ORAL at 05:06

## 2024-11-12 RX ADMIN — HEPARIN SODIUM 5000 UNIT(S): 10000 INJECTION INTRAVENOUS; SUBCUTANEOUS at 05:06

## 2024-11-12 RX ADMIN — Medication 7 UNIT(S): at 08:57

## 2024-11-12 RX ADMIN — VALPROIC ACID 500 MILLIGRAM(S): 250 SOLUTION ORAL at 15:15

## 2024-11-12 RX ADMIN — NYSTATIN 1 APPLICATION(S): 100000 POWDER TOPICAL at 05:06

## 2024-11-12 NOTE — DISCHARGE NOTE NURSING/CASE MANAGEMENT/SOCIAL WORK - PATIENT PORTAL LINK FT
You can access the FollowMyHealth Patient Portal offered by [facility] by registering at the following website: [email or web address]. By joining [facility]’s FollowMyHealth portal, you will also be able to view your health information using other applications (apps) compatible with our system.

## 2024-11-12 NOTE — PHYSICAL THERAPY INITIAL EVALUATION ADULT - NSPTDISCHREC_GEN_A_CORE
Pt is at his baseline performance in functional mobility. No skilled PT intervention indicated at present. Pt is discharged from [facility] Home and resume previous service./No skilled PT needs

## 2024-11-12 NOTE — DISCHARGE NOTE PROVIDER - NSDCMRMEDTOKEN_GEN_ALL_CORE_FT
Aspirin Enteric Coated 81 mg oral delayed release tablet: 1 tab(s) orally once a day   atorvastatin 40 mg oral tablet: 1 tab(s) orally once a day (at bedtime)  divalproex sodium 250 mg oral tablet, extended release: 1 tab(s) orally 3 times a day  lacosamide 200 mg oral tablet: 1 tab(s) orally 2 times a day MDD:2  levETIRAcetam 1000 mg oral tablet: 1 tab(s) orally 2 times a day  levoFLOXacin 500 mg oral tablet: 1 tab(s) orally once a day  lisinopril 20 mg oral tablet: 1 tab(s) orally once a day  metoprolol tartrate 25 mg oral tablet: 1 tab(s) orally 2 times a day  NovoLIN N 100 units/mL subcutaneous suspension: 15 unit(s) subcutaneous 2 times a day

## 2024-11-12 NOTE — DISCHARGE NOTE PROVIDER - NSDCFUADDAPPT_GEN_ALL_CORE_FT
APPTS ARE READY TO BE MADE: [X] YES    Best Family or Patient Contact (if needed):    Additional Information about above appointments (if needed):    1:   2:   3:     Other comments or requests:    APPTS ARE READY TO BE MADE: [X] YES    Best Family or Patient Contact (if needed):    Additional Information about above appointments (if needed):    1:   2:   3:     Other comments or requests:   Patient informed us they already have secured a follow up appointment which was not scheduled by our team.  Spoke with patients daughter. Informed me that pt has already seen PCP.

## 2024-11-12 NOTE — PHYSICAL THERAPY INITIAL EVALUATION ADULT - PERTINENT HX OF CURRENT PROBLEM, REHAB EVAL
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 Per daughters (one at bedside, another on the phone on speaker), the patient was discharged from [facility] 5 days ago after being treated for influenza, and then superimposed pneumonia. Patient was on mechanical ventilator. Since the patient got home, he has been lethargic, less responsive, and eating less. Patient can sometimes answer yes or no but has been less responsive. Patient also had cough, vomiting, diarrhea, foul-smelling urine.

## 2024-11-12 NOTE — DISCHARGE NOTE NURSING/CASE MANAGEMENT/SOCIAL WORK - FINANCIAL ASSISTANCE
[facility] provides services at a reduced cost to those who are determined to be eligible through [facility]’s financial assistance program. Information regarding [facility]’s financial assistance program can be found by going to [email or web address] or by calling [phone].

## 2024-11-12 NOTE — PHYSICAL THERAPY INITIAL EVALUATION ADULT - GENERAL OBSERVATIONS, REHAB EVAL
Pt found semisupine in bed  + 2 L O2 via NC, + IV, + condom cath. Pt arousable by verbal and kinetic stimulation, eyes closed and intermittently open. Responds to name and able to follow simple command for R hand movement. Pt resistive to PROM B UE/LE. Minimal verbal responses and difficult to understand. General weakness was noted c L hemiparesis in L extremities.

## 2024-11-12 NOTE — DISCHARGE NOTE PROVIDER - NSDCCPCAREPLAN_GEN_ALL_CORE_FT
PRINCIPAL DISCHARGE DIAGNOSIS  Diagnosis: Pneumonia  Assessment and Plan of Treatment:       SECONDARY DISCHARGE DIAGNOSES  Diagnosis: Weakness  Assessment and Plan of Treatment:     Diagnosis: GUY (acute kidney injury)  Assessment and Plan of Treatment:

## 2024-11-12 NOTE — DISCHARGE NOTE PROVIDER - DETAILS OF MALNUTRITION DIAGNOSIS/DIAGNOSES
This patient has been assessed with a concern for Malnutrition and was treated during this hospitalization for the following Nutrition diagnosis/diagnoses:     -  11/08/2024: Moderate protein-calorie malnutrition

## 2024-11-12 NOTE — PHYSICAL THERAPY INITIAL EVALUATION ADULT - RANGE OF MOTION EXAMINATION, REHAB EVAL
No active movement was noted in L extremities.PROM of L knee is lack of 30 degrees to full extension.

## 2024-11-12 NOTE — PHYSICAL THERAPY INITIAL EVALUATION ADULT - ADDITIONAL COMMENTS
Pt unable to provide history and per chart review: Pt lives with family. Pt has been bed bound and needed  lift (Agustin lift) for OOB prior to admission. Has [facility]HA services to assist with ADLS.

## 2024-11-12 NOTE — DISCHARGE NOTE PROVIDER - NSDCFUADDINST_GEN_ALL_CORE_FT
1) It is important to see your primary physician as well as other necessary consultants within next 7-10 days to perform a comprehensive medical review.  Call their offices for an appointment as soon as you leave the hospital.  If you do not have a primary physician or unable to reach your PCP, contact the [facility] Physician Referral Service at [phone].  Your medical issues appear to be stable at this time, but if your symptoms recur or worsen, contact your physicians and/or return to the hospital if necessary.  If you encounter any issues or questions with your medication, call your physicians before stopping the medication.    2) Please access [facility] Patient portal (as instructed on the discharge paperwork) to access your medical records at any time after discharge.     3) please check finger sticks and blood pressure at home and keep a log for PCP    4) I have discontinued water pill HCTZ. Medications for seizure have not been changed and please continue with pre-admission anti-epileptiform meds (seizure meds)

## 2024-11-12 NOTE — DISCHARGE NOTE PROVIDER - INSTRUCTIONS
Encourage oral liquids  aspiration precautions. small bite size, oral hygiene and feeding while patient sitting up.

## 2024-11-12 NOTE — PHYSICAL THERAPY INITIAL EVALUATION ADULT - MANUAL MUSCLE TESTING RESULTS, REHAB EVAL
RUE is 3-/5 and RLE is 2-/5 from observation. No active movement in L UE/LE./grossly assessed due to

## 2024-11-12 NOTE — DISCHARGE NOTE PROVIDER - HOSPITAL COURSE
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 Admitted for sepsis      Problem/Plan - 1:  ·  Problem: Sepsis with acute metabolic encephalopathy with aspiration pneumonia.   Lost iv access. could not received iv zosyn last night. Given levaquin.   Patient passed swallow eval  cont pureed diet with aspiration precautions.     Macrocytic anemia. Reviewed vitamin B12 and folate.     Hypernatremia. Improved. Off IVF.     Problem/Plan - 3:  ·  Problem: Proctocolitis.   ·  Plan: - CT abd = There is mild wall thickening of the rectosigmoid colon, which could be due to underdistention or a superimposed proctocolitis.      Problem/Plan - 5:  ·  Problem: GUY (acute kidney injury). Pre-renal likely  ·  Plan: - P/w SCr 2.45  Improved. called and discussed with daughter Lisandra on phone to encourage oral fluid intake at home with aspiration precautions.     Problem/Plan - 6:  ·  Problem: Seizure disorder.   ·  Plan: - Home meds lacosamide, levetiracetam, divalproex  seizure precautions     Problem/Plan - 7:  ·  Problem: DM (diabetes mellitus) type 2. cont current management. Follow finger sticks at home.     Problem/Plan - 8:  ·  Problem: HTN (hypertension). controlled.   ·  restart home meds. Monitor blood pressure at home.     Problem/Plan - 9:  ·  Problem: HLD (hyperlipidemia).   ·  Plan: - Hold home med in the setting of sepsis.    Moderate PCM. cont supplements.       FC    Home with HHA today  Seen and examined by me today. Vitals stable.   I have discussed all the inpatient radiographic findings with the patient and stressed that patient follows with the PCP for further outpatient care. I have educated patient to use [facility] patient portal (as instructed on the discharge paperwork) to access medical records outside the hospital.   All questions welcomed and answered appropriately. Patient verbalized understanding of post discharge physician's follows up and discharge instructions.   DC time spent by me face to face excluding billable procedures 38 mins

## 2024-11-18 DIAGNOSIS — A41.9 SEPSIS, UNSPECIFIED ORGANISM: ICD-10-CM

## 2024-11-18 DIAGNOSIS — D53.9 NUTRITIONAL ANEMIA, UNSPECIFIED: ICD-10-CM

## 2024-11-18 DIAGNOSIS — G93.41 METABOLIC ENCEPHALOPATHY: ICD-10-CM

## 2024-11-18 DIAGNOSIS — J69.0 PNEUMONITIS DUE TO INHALATION OF FOOD AND VOMIT: ICD-10-CM

## 2024-11-18 DIAGNOSIS — I10 ESSENTIAL (PRIMARY) HYPERTENSION: ICD-10-CM

## 2024-11-18 DIAGNOSIS — N17.9 ACUTE KIDNEY FAILURE, UNSPECIFIED: ICD-10-CM

## 2024-11-18 DIAGNOSIS — E78.5 HYPERLIPIDEMIA, UNSPECIFIED: ICD-10-CM

## 2024-11-18 DIAGNOSIS — R53.1 WEAKNESS: ICD-10-CM

## 2024-11-18 DIAGNOSIS — E11.9 TYPE 2 DIABETES MELLITUS WITHOUT COMPLICATIONS: ICD-10-CM

## 2024-11-18 DIAGNOSIS — E44.0 MODERATE PROTEIN-CALORIE MALNUTRITION: ICD-10-CM

## 2024-11-18 DIAGNOSIS — I69.354 HEMIPLEGIA AND HEMIPARESIS FOLLOWING CEREBRAL INFARCTION AFFECTING LEFT NON-DOMINANT SIDE: ICD-10-CM

## 2024-11-18 DIAGNOSIS — E87.0 HYPEROSMOLALITY AND HYPERNATREMIA: ICD-10-CM

## 2025-01-24 NOTE — DIETITIAN INITIAL EVALUATION ADULT - PROBLEM SELECTOR PLAN 3
172.72
- CT abd = There is mild wall thickening of the rectosigmoid colon, which could be due to underdistention or a superimposed proctocolitis.

## 2025-04-15 NOTE — ED ADULT NURSE NOTE - NSSEPSISSUSPECTED_ED_A_ED
\"Have you been to the ER, urgent care clinic since your last visit?  Hospitalized since your last visit?\"    NO    “Have you seen or consulted any other health care providers outside our system since your last visit?”    NO              Take 1 tablet by mouth at bedtime       No current facility-administered medications for this visit.     Past Medical History:   Diagnosis Date    Allergic rhinitis 9/20/2017    Arthritis     ASCVD (arteriosclerotic cardiovascular disease) 9/20/2017    Story: Old ASMI by EKG    Back pain 9/20/2017    BPH (benign prostatic hyperplasia) 9/20/2017    CHF (congestive heart failure) ([facility]) 9/20/2017    CKD (chronic kidney disease) 9/20/2017    COVID-19 11/2021    Diabetic acetonemia (HCC)     DM (diabetes mellitus) ([facility]) 9/20/2017    Story: Diet Controlled    ED (erectile dysfunction) 9/20/2017    Edema 9/20/2017    Elevated CPK 9/20/2017    Comments: History of    Elevated LFTs 9/20/2017    Comments: History of    Elevated PSA 9/20/2017    Hypercholesteremia     Hyperlipidemia 9/20/2017    Hypertension     Hypertension with renal disease 9/20/2017    Nodule of right lung 9/20/2017    Story: Right    Polymyalgia 9/20/2017    Prostate enlargement     Substance abuse      Past Surgical History:   Procedure Laterality Date    ARTHROCENTESIS  7/13/2023    CARDIAC PROCEDURE N/A 7/7/2023    Insert temporary pacemaker performed by Dominic Peace MD at [facility] CARDIAC CATH LAB    CENTRAL LINE  7/4/2023    EP DEVICE PROCEDURE N/A 7/13/2023    Insert or replace transcath PPM leadless performed by Jewel Call MD at [facility] CARDIAC CATH LAB    FOOT DEBRIDEMENT Left 2/13/2025    LEFT FOOT ABSCESS  INCISION AND DRAINAGE and BONE BIOPSY performed by Radha Carrasco DPM at [facility] MAIN OR    HEENT  06/12/2018    Dr. Menezes, surgery to remove cancerous tissue from Left cheek    MALIGNANT SKIN LESION EXCISION  09/2018    2 lesions on head    TN UNLISTED PROCEDURE ABDOMEN PERITONEUM & OMENTUM      hernia repair    TOE AMPUTATION Right 9/30/2023    RIGHT SECOND TOE AMPUTATION performed by Odin Zavala DPM at [facility] MAIN OR     Not on File    REVIEW OF SYSTEMS:  General: negative for - chills or fever, or weight loss or gain  ENT: negative  No
